# Patient Record
Sex: FEMALE | Race: ASIAN | NOT HISPANIC OR LATINO | Employment: FULL TIME | URBAN - METROPOLITAN AREA
[De-identification: names, ages, dates, MRNs, and addresses within clinical notes are randomized per-mention and may not be internally consistent; named-entity substitution may affect disease eponyms.]

---

## 2017-04-07 ENCOUNTER — MYC REFILL (OUTPATIENT)
Dept: FAMILY MEDICINE | Facility: CLINIC | Age: 37
End: 2017-04-07

## 2017-04-07 DIAGNOSIS — J30.1 SEASONAL ALLERGIC RHINITIS DUE TO POLLEN: ICD-10-CM

## 2017-04-12 RX ORDER — MONTELUKAST SODIUM 10 MG/1
10 TABLET ORAL AT BEDTIME
Qty: 90 TABLET | Refills: 4 | Status: SHIPPED | OUTPATIENT
Start: 2017-04-12 | End: 2017-04-13

## 2017-04-12 RX ORDER — MONTELUKAST SODIUM 10 MG/1
10 TABLET ORAL AT BEDTIME
Qty: 90 TABLET | Refills: 4 | Status: SHIPPED | OUTPATIENT
Start: 2017-04-12 | End: 2018-02-23

## 2017-04-12 NOTE — TELEPHONE ENCOUNTER
Message from [facility]:  Loli Cary RN Tue Apr 11, 2017 8:09 AM     From: ISIAH MAYNARD   Sent: Tue Apr 11, 2017 8:09 AM   To: [facility]  Subject: FW: Medication Renewal Request      ----- Message -----   From: Cami Hernandez RN   Sent: 4/10/2017 4:26 PM   To: Loli Cary RN  Subject: FW: Medication Renewal Request     Sending this back to [facility], to Loli ROBLES, as per [employer] stream you are working on this already.   ----- Message -----   From: Elena Tony RN   Sent: 4/10/2017 2:41 PM   To: [facility]  Subject: FW: Medication Renewal Request         ----- Message -----   From: Isiah Maynard   Sent: 4/10/2017 8:42 AM   To: [facility]  Subject: RE: Medication Renewal Request     My email is [email or web address].    Thanks!  Isiah  ----- Message -----  From: Loli Cary RN  Sent: 4/7/2017 3:17 PM CDT  To: Isiah Maynard  Subject: RE: Medication Renewal Request    Robert Joyce, What is your email address? Loli AN    ----- Message -----   From: Isiah Maynard   Sent: 4/7/2017 2:54 PM CDT   To: Eloise Triana MD  Subject: Medication Renewal Request    Original authorizing provider: MD Isiah Hairston would like a refill of the following medications:  fluticasone (VERAMYST) 27.5 MCG/SPRAY nasal spray [Eloise Triana MD]  montelukast (SINGULAIR) 10 MG tablet [Eloise Triana MD]    Preferred pharmacy: WRITTEN PRESCRIPTION REQUESTED    Comment:  I recently switched insurance companies from [employer] to [employer]. My mail order prescription service has changed. Can you e-mail me copies of my prescriptions for Singulair and Veramyst so that I can submit to my new mail order service?    My [employer] ID is:  [id number]  Choice POS II  Grp: [id number]  RX Bin# [id number]    Thanks,  Isiah   [phone]

## 2017-04-13 ENCOUNTER — MYC MEDICAL ADVICE (OUTPATIENT)
Dept: OBGYN | Facility: CLINIC | Age: 37
End: 2017-04-13

## 2017-04-13 DIAGNOSIS — J30.1 SEASONAL ALLERGIC RHINITIS DUE TO POLLEN: ICD-10-CM

## 2017-04-13 RX ORDER — MONTELUKAST SODIUM 10 MG/1
10 TABLET ORAL AT BEDTIME
Qty: 90 TABLET | Refills: 4 | Status: SHIPPED | OUTPATIENT
Start: 2017-04-13 | End: 2018-02-23

## 2017-04-13 NOTE — TELEPHONE ENCOUNTER
Dr. Triana signed these prescriptions for this [place] patient here at [facility]. They were emailed and mailed to patient per request

## 2017-04-13 NOTE — TELEPHONE ENCOUNTER
Isiah requested we email her the prescriptions,now she wanted them faxed to [facility].    So I re-ordered both Singular and fluticasone and e-prescribed them to [facility].    I informed Isiah of this by [employer].

## 2018-02-20 NOTE — PROGRESS NOTES
SUBJECTIVE:   CC: Isiah Reina is an 37 year old woman who presents for preventive health visit. Last visit was in , here at [facility] with Dr. Triana. She had a normal PAP and negative HPV. She would like her lipids  Assessed. She takes a pre- vitamin daily, is unsure how much Vitamin D is in each capsule.   Only concern is she is undergoing care with [facility]. Was on clomid for a cycle which she could not tolerate d/t headaches and nausea. She prefers the regimen she has now. LMP was 2/15/18. No known cause for her not conceiving yet, per pt, so they are just trying to increase her chances via use of hormones.       Healthy Habits:    Do you get at least three servings of calcium containing foods daily (dairy, green leafy vegetables, etc.)? Yes    Typical breakfast: Yogurt, egg ham scramble    Typical lunch: Javed Lawrence (Kinh Di)    Typical dinner: Was in an airport, so ate a greek yogurt and chicken nuggets.    Amount of exercise or daily activities, outside of work: 5-6 times per week. Typically runs, but during winter does pilates, yoga, spin classes, barre. Cardio is 45-60 min at least 5 times per week. Trying to build in relaxation.She loves to bake and does this consistently for stress reduction.    Problems taking medications regularly No    Medication side effects: No    Have you had an eye exam in the past two years? Yes, was 2017, wears glasses.    Do you see a dentist twice per year? No    Do you have sleep apnea, excessive snoring or daytime drowsiness?no        Today's PHQ-2 Score:   PHQ-2 (  [employer]) 2015   Q1: Little interest or pleasure in doing things 0 0   Q2: Feeling down, depressed or hopeless 0 0   PHQ-2 Score 0 0       Abuse: Current or Past(Physical, Sexual or Emotional)- No  Do you feel safe in your environment - Yes    Social History   Substance Use Topics     Smoking status: Never Smoker     Smokeless tobacco: Not on file      Alcohol use 0.6 oz/week     1 Standard drinks or equivalent per week     If you drink alcohol do you typically have >3 drinks per day or >7 drinks per week? NO                   Reviewed orders with patient.  Reviewed health maintenance and updated orders accordingly - Yes  Labs reviewed in [facility]    Mammogram not appropriate for this patient based on age.    Pertinent mammograms are reviewed under the imaging tab.  History of abnormal Pap smear: NO - age 30-65 PAP every 5 years with negative HPV co-testing recommended    Reviewed and updated as needed this visit by clinical staff       Reviewed and updated as needed this visit by Provider        Past Medical History:   Diagnosis Date     Allergic rhinitis      Depression     During grad school only, was situational. She received counseling weekly for a period of time. No suicidal ideation. No medicaiton needed.     Rectal bleeding     had colonoscopy all normal, no recurrence     Right wrist fracture 2014      History reviewed. No pertinent surgical history.     PAST MEDICAL HISTORY:   Past Medical History:   Diagnosis Date     Allergic rhinitis      Depression     During grad school only, was situational. She received counseling weekly for a period of time. No suicidal ideation. No medicaiton needed.     Rectal bleeding     had colonoscopy all normal, no recurrence     Right wrist fracture 2014       PAST SURGICAL HISTORY: History reviewed. No pertinent surgical history.    FAMILY HISTORY:   Family History   Problem Relation Age of Onset     Adopted: Yes       SOCIAL HISTORY:   Social History   Substance Use Topics     Smoking status: Never Smoker     Smokeless tobacco: Never Used     Alcohol use 0.6 oz/week     1 Standard drinks or equivalent per week       ROS:  C: NEGATIVE for fever, chills, change in weight  I: NEGATIVE for worrisome rashes, moles or lesions  E: NEGATIVE for vision changes or irritation  ENT: NEGATIVE for ear, mouth and throat problems  R:  "NEGATIVE for significant cough or SOB  B: NEGATIVE for masses, tenderness or discharge  CV: NEGATIVE for chest pain, palpitations or peripheral edema  GI: NEGATIVE for nausea, abdominal pain, heartburn, or change in bowel habits  : NEGATIVE for unusual urinary or vaginal symptoms. Periods are regular.  M: NEGATIVE for significant arthralgias or myalgia  N: NEGATIVE for weakness, dizziness or paresthesias  P: NEGATIVE for changes in mood or affect    OBJECTIVE:   /80  Pulse 67  Temp 98.2  F (36.8  C) (Oral)  Ht 5' 4.5\" (163.8 cm)  Wt 175 lb 1.9 oz (79.4 kg)  LMP 02/17/2018 (Exact Date)  SpO2 97%  Breastfeeding? No  BMI 29.6 kg/m2  EXAM:  GENERAL: healthy, alert and no distress  Ears, Nose and Throat: tympanic membranes swollen bilaterally, nose is otherwise clear, throat clear.   NECK: no adenopathy, no asymmetry, masses, or scars and thyroid normal to palpation  RESP: lungs clear to auscultation - no rales, rhonchi or wheezes  CV: regular rate and rhythm, normal S1 S2, no S3 or S4, no murmur, click or rub, no peripheral edema and peripheral pulses strong  ABDOMEN: soft, nontender, no hepatosplenomegaly, no masses and bowel sounds normal  MS: no gross musculoskeletal defects noted, no edema    ASSESSMENT/PLAN:   1. Routine general medical examination at a health care facility  Pt is not fasting today. Will send results via [employer]. No PAP needed today. Pt declines any need for STD screening. She is under the care of [facility] for fertility and is interested in another opinion and after discussion, possibly would benefit from a support group/counseling. I will send this information to her with her lab results in [employer]. Her BP has been increased at her visits with [facility], but never here. Did mention she could purchase a cuff is she is concerned, but reassurance provided today based on our readings.  Pt also requests a recommendation for dentistry in [place]. Will supply via " "[facility] as well.    - Basic Metabolic Panel (LabDAQ)  - Vitamin D Deficiency    2. Chronic non-seasonal allergic rhinitis, unspecified trigger  Refilled today and sent to [facility].  - montelukast (SINGULAIR) 10 MG tablet; Take 1 tablet (10 mg) by mouth At Bedtime  Dispense: 90 tablet; Refill: 4    3. Insomnia, transient  Used only when travelling internationally. Printed a copy for pt today.  - zolpidem (AMBIEN) 10 MG tablet; Take 1 tablet (10 mg) by mouth nightly as needed for sleep  Dispense: 20 tablet; Refill: 0    4. Screening for hyperlipidemia  Will send results to [facility].  - Lipid Panel Plus ([place])    5. Encounter for vitamin deficiency screening  Will send results to [facility].  - Vitamin D Deficiency    COUNSELING:   Reviewed preventive health counseling, as reflected in patient instructions       Regular exercise       Healthy diet/nutrition       Vision screening       Immunizations    Declined: Influenza due to Other Has never had an influenza shot and prefers not to. Did inquire about Hep B and MMR, but feels she is current with these as well.                  reports that she has never smoked. She does not have any smokeless tobacco history on file.    Estimated body mass index is 183.17 kg/(m^2) as calculated from the following:    Height as of 11/11/16: 2' 1.39\" (64.5 cm).    Weight as of 11/11/16: 168 lb (76.2 kg).       Counseling Resources:  ATP IV Guidelines  Pooled Cohorts Equation Calculator  Breast Cancer Risk Calculator  FRAX Risk Assessment  ICSI Preventive Guidelines  Dietary Guidelines for Americans, 2010  USDA's MyPlate  ASA Prophylaxis  Lung CA Screening    Juan Nix, NIEVES  [facility]  "

## 2018-02-23 ENCOUNTER — OFFICE VISIT (OUTPATIENT)
Dept: FAMILY MEDICINE | Facility: CLINIC | Age: 38
End: 2018-02-23
Payer: COMMERCIAL

## 2018-02-23 VITALS
TEMPERATURE: 98.2 F | HEIGHT: 65 IN | DIASTOLIC BLOOD PRESSURE: 80 MMHG | BODY MASS INDEX: 29.18 KG/M2 | OXYGEN SATURATION: 97 % | HEART RATE: 67 BPM | SYSTOLIC BLOOD PRESSURE: 117 MMHG | WEIGHT: 175.12 LBS

## 2018-02-23 DIAGNOSIS — Z13.220 SCREENING FOR HYPERLIPIDEMIA: ICD-10-CM

## 2018-02-23 DIAGNOSIS — Z00.00 ROUTINE GENERAL MEDICAL EXAMINATION AT A HEALTH CARE FACILITY: Primary | ICD-10-CM

## 2018-02-23 DIAGNOSIS — J30.89 CHRONIC NON-SEASONAL ALLERGIC RHINITIS, UNSPECIFIED TRIGGER: ICD-10-CM

## 2018-02-23 DIAGNOSIS — F51.02 INSOMNIA, TRANSIENT: ICD-10-CM

## 2018-02-23 DIAGNOSIS — Z13.21 ENCOUNTER FOR VITAMIN DEFICIENCY SCREENING: ICD-10-CM

## 2018-02-23 LAB
ALT SERPL-CCNC: 14 U/L (ref 0–50)
AST SERPL-CCNC: 31 U/L (ref 0–45)
BUN SERPL-MCNC: 15 MG/DL (ref 5–24)
CALCIUM SERPL-MCNC: 9.2 MG/DL (ref 8.5–10.4)
CHLORIDE SERPLBLD-SCNC: 103 MMOL/L (ref 94–109)
CHOLEST SERPL-MCNC: 170 MG/DL (ref 0–200)
CHOLEST/HDLC SERPL: 2.6 {RATIO} (ref 0–5)
CO2 SERPL-SCNC: 27 MMOL/L (ref 20–32)
CREAT SERPL-MCNC: 0.5 MG/DL (ref 0.6–1.3)
EGFR CALCULATED (BLACK REFERENCE): 178.5
EGFR CALCULATED (NON BLACK REFERENCE): 147.6
FASTING SPECIMEN: NO
GLUCOSE SERPL-MCNC: 111 MG/DL (ref 60–109)
GLUCOSE SERPL-MCNC: 111 MG/DL (ref 60–109)
HDLC SERPL-MCNC: 66 MG/DL
LDLC SERPL CALC-MCNC: 83 MG/DL (ref 0–129)
POTASSIUM SERPL-SCNC: 3.7 MMOL/L (ref 3.4–5.3)
SODIUM SERPL-SCNC: 141 MMOL/L (ref 133–144)
TRIGL SERPL-MCNC: 108 MG/DL (ref 0–150)
VLDL-CHOLESTEROL: 22 (ref 7–32)

## 2018-02-23 RX ORDER — MONTELUKAST SODIUM 10 MG/1
10 TABLET ORAL AT BEDTIME
Qty: 90 TABLET | Refills: 4 | Status: SHIPPED | OUTPATIENT
Start: 2018-02-23 | End: 2018-09-19

## 2018-02-23 RX ORDER — ZOLPIDEM TARTRATE 10 MG/1
10 TABLET ORAL
Qty: 20 TABLET | Refills: 0 | Status: SHIPPED | OUTPATIENT
Start: 2018-02-23 | End: 2019-03-18

## 2018-02-23 ASSESSMENT — PAIN SCALES - GENERAL: PAINLEVEL: NO PAIN (0)

## 2018-02-23 NOTE — MR AVS SNAPSHOT
After Visit Summary   2/23/2018    Isiah Reina    MRN: [id number]           Patient Information     Date Of Birth          1980        Visit Information        Provider Department      2/23/2018 3:00 PM Juan Nix NP [facility]        Today's Diagnoses     Routine general medical examination at a health care facility    -  1    Chronic non-seasonal allergic rhinitis, unspecified trigger        Insomnia, transient        Screening for hyperlipidemia        Encounter for vitamin deficiency screening          Care Instructions      Preventive Health Recommendations  Female Ages 26 - 39  Yearly exam:   See your health care provider every year in order to    Review health changes.     Discuss preventive care.      Review your medicines if you your doctor has prescribed any.    Until age 30: Get a Pap test every three years (more often if you have had an abnormal result).    After age 30: Talk to your doctor about whether you should have a Pap test every 3 years or have a Pap test with HPV screening every 5 years.   You do not need a Pap test if your uterus was removed (hysterectomy) and you have not had cancer.  You should be tested each year for STDs (sexually transmitted diseases), if you're at risk.   Talk to your provider about how often to have your cholesterol checked.  If you are at risk for diabetes, you should have a diabetes test (fasting glucose).  Shots: Get a flu shot each year. Get a tetanus shot every 10 years.   Nutrition:     Eat at least 5 servings of fruits and vegetables each day.    Eat whole-grain bread, whole-wheat pasta and brown rice instead of white grains and rice.    Talk to your provider about Calcium and Vitamin D.     Lifestyle    Exercise at least 150 minutes a week (30 minutes a day, 5 days of the week). This will help you control your weight and prevent disease.    Limit alcohol to one drink per day.    No smoking.     Wear sunscreen to prevent  "skin cancer.    See your dentist every six months for an exam and cleaning.            Follow-ups after your visit        Who to contact     Please call your clinic at [phone] to:    Ask questions about your health    Make or cancel appointments    Discuss your medicines    Learn about your test results    Speak to your doctor            Additional Information About Your Visit        Shir[employer] Information     [employer] gives you secure access to your electronic health record. If you see a primary care provider, you can also send messages to your care team and make appointments. If you have questions, please call your primary care clinic.  If you do not have a primary care provider, please call [phone] and they will assist you.      [employer] is an electronic gateway that provides easy, online access to your medical records. With [employer], you can request a clinic appointment, read your test results, renew a prescription or communicate with your care team.     To access your existing account, please contact your [facility] or call [phone] for assistance.        Care EveryWhere ID     This is your Care EveryWhere ID. This could be used by other organizations to access your [place] medical records  [id number]        Your Vitals Were     Pulse Temperature Height Last Period Pulse Oximetry Breastfeeding?    67 98.2  F (36.8  C) (Oral) 5' 4.5\" (163.8 cm) 02/17/2018 (Exact Date) 97% No    BMI (Body Mass Index)                   29.6 kg/m2            Blood Pressure from Last 3 Encounters:   02/23/18 117/80   11/11/16 120/81   05/13/16 122/75    Weight from Last 3 Encounters:   02/23/18 175 lb 1.9 oz (79.4 kg)   11/11/16 168 lb (76.2 kg)   05/13/16 171 lb (77.6 kg)              We Performed the Following     ADMIN INFLUENZA VIRUS VACCINE     Basic Metabolic Panel (LabDAQ)     Lipid Panel Plus ([place])     Vitamin D Deficiency          Where to get your medicines      These " medications were sent to [facility] - [place], [place] - [address]  [address], [facility] [address]     Phone:  [phone]     montelukast 10 MG tablet         Some of these will need a paper prescription and others can be bought over the counter.  Ask your nurse if you have questions.     Bring a paper prescription for each of these medications     zolpidem 10 MG tablet          Primary Care Provider Office Phone # Fax #    Mckenna De Jesus PA-C [phone]       [address] [place] [address]        Equal Access to Services     [facility]: Hadii aminta scott hadasho Soelijah, waaxda luqadaha, qaybta kaalmada adedaveyabelkis, katelyn orca . So [facility] [phone].    ATENCIÓN: Si habla español, tiene a dominguez disposición servicios gratuitos de asistencia lingüística. [facility] [phone].    We comply with applicable federal civil rights laws and [place] laws. We do not discriminate on the basis of race, color, national origin, age, disability, sex, sexual orientation, or gender identity.            Thank you!     Thank you for choosing [facility]  for your care. Our goal is always to provide you with excellent care. Hearing back from our patients is one way we can continue to improve our services. Please take a few minutes to complete the written survey that you may receive in the mail after your visit with us. Thank you!             Your Updated Medication List - Protect others around you: Learn how to safely use, store and throw away your medicines at www.disposemymeds.org.          This list is accurate as of 2/23/18  5:20 PM.  Always use your most recent med list.                   Brand Name Dispense Instructions for use Diagnosis    fexofenadine 180 MG tablet    ALLEGRA    90 tablet    Take 1 tablet (180 mg) by mouth as needed    Seasonal allergic rhinitis due to pollen       fluticasone 27.5 MCG/SPRAY spray     VERAMYST    30 g    Spray 2 sprays into both nostrils daily    Seasonal allergic rhinitis due to pollen       LETROZOLE PO      Take 5 mg by mouth daily        montelukast 10 MG tablet    SINGULAIR    90 tablet    Take 1 tablet (10 mg) by mouth At Bedtime    Chronic non-seasonal allergic rhinitis, unspecified trigger       OVIDREL 250 MCG/0.5ML injection   Generic drug:  choriogonadotropin ross      Inject 250 mcg Subcutaneous once        progesterone 100 MG capsule    PROMETRIUM     Take 100 mg by mouth 2 times daily        zolpidem 10 MG tablet    AMBIEN    20 tablet    Take 1 tablet (10 mg) by mouth nightly as needed for sleep    Insomnia, transient

## 2018-02-23 NOTE — NURSING NOTE
"37 year old  Chief Complaint   Patient presents with     Physical     patient is not fasting     RECHECK     Blood Pressure concerns       Blood pressure 117/80, pulse 67, temperature 98.2  F (36.8  C), temperature source Oral, height 5' 4.5\" (163.8 cm), weight 175 lb 1.9 oz (79.4 kg), last menstrual period 02/17/2018, SpO2 97 %, not currently breastfeeding. Body mass index is 29.6 kg/(m^2).  Patient Active Problem List   Diagnosis     Seasonal allergic rhinitis       Wt Readings from Last 2 Encounters:   02/23/18 175 lb 1.9 oz (79.4 kg)   11/11/16 168 lb (76.2 kg)     BP Readings from Last 3 Encounters:   02/23/18 117/80   11/11/16 120/81   05/13/16 122/75         Current Outpatient Prescriptions   Medication     LETROZOLE PO     choriogonadotropin ross (OVIDREL) 250 MCG/0.5ML injection     progesterone (PROMETRIUM) 100 MG capsule     montelukast (SINGULAIR) 10 MG tablet     fluticasone (VERAMYST) 27.5 MCG/SPRAY spray     zolpidem (AMBIEN) 10 MG tablet     [DISCONTINUED] montelukast (SINGULAIR) 10 MG tablet     fexofenadine (ALLEGRA) 180 MG tablet     No current facility-administered medications for this visit.        Social History   Substance Use Topics     Smoking status: Never Smoker     Smokeless tobacco: Not on file     Alcohol use 0.6 oz/week     1 Standard drinks or equivalent per week       Health Maintenance Due   Topic Date Due     INFLUENZA VACCINE (SYSTEM ASSIGNED)  09/01/2017       Lab Results   Component Value Date    PAP NIL 11/11/2016         ANDRE ClemensPMyleneNMylene  February 23, 2018 3:09 PM    "

## 2018-02-24 LAB — DEPRECATED CALCIDIOL+CALCIFEROL SERPL-MC: 16 UG/L (ref 20–75)

## 2018-04-18 ENCOUNTER — MEDICAL CORRESPONDENCE (OUTPATIENT)
Dept: HEALTH INFORMATION MANAGEMENT | Facility: CLINIC | Age: 38
End: 2018-04-18

## 2018-04-24 ENCOUNTER — TRANSFERRED RECORDS (OUTPATIENT)
Dept: HEALTH INFORMATION MANAGEMENT | Facility: CLINIC | Age: 38
End: 2018-04-24

## 2018-04-25 DIAGNOSIS — Z01.89 ENCOUNTER FOR LABORATORY TEST: Primary | ICD-10-CM

## 2018-05-03 ENCOUNTER — TRANSFERRED RECORDS (OUTPATIENT)
Dept: HEALTH INFORMATION MANAGEMENT | Facility: CLINIC | Age: 38
End: 2018-05-03

## 2018-05-08 ENCOUNTER — TRANSFERRED RECORDS (OUTPATIENT)
Dept: HEALTH INFORMATION MANAGEMENT | Facility: CLINIC | Age: 38
End: 2018-05-08

## 2018-06-01 ENCOUNTER — MYC MEDICAL ADVICE (OUTPATIENT)
Dept: FAMILY MEDICINE | Facility: CLINIC | Age: 38
End: 2018-06-01

## 2018-09-19 DIAGNOSIS — J30.89 CHRONIC NON-SEASONAL ALLERGIC RHINITIS, UNSPECIFIED TRIGGER: ICD-10-CM

## 2018-09-19 RX ORDER — MONTELUKAST SODIUM 10 MG/1
10 TABLET ORAL AT BEDTIME
Qty: 90 TABLET | Refills: 2 | Status: SHIPPED | OUTPATIENT
Start: 2018-09-19 | End: 2019-05-02

## 2018-09-20 ENCOUNTER — ALLIED HEALTH/NURSE VISIT (OUTPATIENT)
Dept: FAMILY MEDICINE | Facility: CLINIC | Age: 38
End: 2018-09-20
Payer: COMMERCIAL

## 2018-09-20 DIAGNOSIS — Z23 NEED FOR VARICELLA VACCINE: ICD-10-CM

## 2018-09-20 DIAGNOSIS — Z23 NEED FOR MMR VACCINE: Primary | ICD-10-CM

## 2018-10-22 ENCOUNTER — ALLIED HEALTH/NURSE VISIT (OUTPATIENT)
Dept: FAMILY MEDICINE | Facility: CLINIC | Age: 38
End: 2018-10-22
Payer: COMMERCIAL

## 2018-10-22 DIAGNOSIS — Z01.89 ENCOUNTER FOR LABORATORY TEST: Primary | ICD-10-CM

## 2018-10-22 DIAGNOSIS — Z23 NEED FOR CHICKENPOX VACCINATION: Primary | ICD-10-CM

## 2018-10-22 DIAGNOSIS — Z23 NEED FOR MMR VACCINE: ICD-10-CM

## 2018-10-22 NOTE — MR AVS SNAPSHOT
After Visit Summary   10/22/2018    Isiah Reina    MRN: [id number]           Patient Information     Date Of Birth          1980        Visit Information        Provider Department      10/22/2018 3:00 PM Nurse, [facility]        Today's Diagnoses     Need for chickenpox vaccination    -  1    Need for MMR vaccine           Follow-ups after your visit        Who to contact     Please call your clinic at [phone] to:    Ask questions about your health    Make or cancel appointments    Discuss your medicines    Learn about your test results    Speak to your doctor            Additional Information About Your Visit        [employer] Information     [employer] gives you secure access to your electronic health record. If you see a primary care provider, you can also send messages to your care team and make appointments. If you have questions, please call your primary care clinic.  If you do not have a primary care provider, please call [phone] and they will assist you.      [employer] is an electronic gateway that provides easy, online access to your medical records. With [employer], you can request a clinic appointment, read your test results, renew a prescription or communicate with your care team.     To access your existing account, please contact your [facility] or call [phone] for assistance.        Care EveryWhere ID     This is your Care EveryWhere ID. This could be used by other organizations to access your [place] medical records  [id number]         Blood Pressure from Last 3 Encounters:   02/23/18 117/80   11/11/16 120/81   05/13/16 122/75    Weight from Last 3 Encounters:   02/23/18 175 lb 1.9 oz (79.4 kg)   11/11/16 168 lb (76.2 kg)   05/13/16 171 lb (77.6 kg)              We Performed the Following     CHICKEN POX VACCINE,LIVE,SUBCUT     MMR VIRUS IMMUNIZATION, SUBCUT     VACCINE ADMINISTRATION, EACH ADDITIONAL     VACCINE  ADMINISTRATION, INITIAL        Primary Care Provider Office Phone # Fax #    Mckenna De Jesus PA-C [phone]       [address] [place] [address]        Equal Access to Services     LINDA VÁSQUEZ : Hadii aad ku hadjossieo Soelijah, waaxda luqadaha, qaybta kaalmada adedaveyabelkis, katelyn iljody sujonaenzo cronin. So [facility] [phone].    ATENCIÓN: Si habla español, tiene a dominguez disposición servicios gratuitos de asistencia lingüística. [facility] [phone].    We comply with applicable federal civil rights laws and [place] laws. We do not discriminate on the basis of race, color, national origin, age, disability, sex, sexual orientation, or gender identity.            Thank you!     Thank you for choosing [facility]  for your care. Our goal is always to provide you with excellent care. Hearing back from our patients is one way we can continue to improve our services. Please take a few minutes to complete the written survey that you may receive in the mail after your visit with us. Thank you!             Your Updated Medication List - Protect others around you: Learn how to safely use, store and throw away your medicines at www.disposemymeds.org.          This list is accurate as of 10/22/18  3:53 PM.  Always use your most recent med list.                   Brand Name Dispense Instructions for use Diagnosis    fexofenadine 180 MG tablet    ALLEGRA    90 tablet    Take 1 tablet (180 mg) by mouth as needed    Seasonal allergic rhinitis due to pollen       fluticasone 27.5 MCG/SPRAY spray    VERAMYST    30 g    Spray 2 sprays into both nostrils daily    Seasonal allergic rhinitis due to pollen       LETROZOLE PO      Take 5 mg by mouth daily        montelukast 10 MG tablet    SINGULAIR    90 tablet    Take 1 tablet (10 mg) by mouth At Bedtime    Chronic non-seasonal allergic rhinitis, unspecified trigger       OVIDREL 250 MCG/0.5ML injection   Generic drug:  choriogonadotropin ross       Inject 250 mcg Subcutaneous once        progesterone 100 MG capsule    PROMETRIUM     Take 100 mg by mouth 2 times daily        zolpidem 10 MG tablet    AMBIEN    20 tablet    Take 1 tablet (10 mg) by mouth nightly as needed for sleep    Insomnia, transient

## 2019-01-22 ENCOUNTER — MEDICAL CORRESPONDENCE (OUTPATIENT)
Dept: HEALTH INFORMATION MANAGEMENT | Facility: CLINIC | Age: 39
End: 2019-01-22

## 2019-01-22 DIAGNOSIS — N97.9 PRIMARY FEMALE INFERTILITY: Primary | ICD-10-CM

## 2019-01-23 DIAGNOSIS — N97.9 PRIMARY FEMALE INFERTILITY: Primary | ICD-10-CM

## 2019-01-23 LAB
ESTRADIOL SERPL-MCNC: 563 PG/ML
PROGEST SERPL-MCNC: 13.7 NG/ML

## 2019-01-25 ENCOUNTER — TRANSFERRED RECORDS (OUTPATIENT)
Dept: HEALTH INFORMATION MANAGEMENT | Facility: CLINIC | Age: 39
End: 2019-01-25

## 2019-02-04 ENCOUNTER — MEDICAL CORRESPONDENCE (OUTPATIENT)
Dept: HEALTH INFORMATION MANAGEMENT | Facility: CLINIC | Age: 39
End: 2019-02-04

## 2019-02-06 DIAGNOSIS — O09.811 SUPERVISION OF PREGNANCY RESULTING FROM ASSISTED REPRODUCTIVE TECHNOLOGY IN FIRST TRIMESTER: Primary | ICD-10-CM

## 2019-02-06 DIAGNOSIS — N92.6 MISSED PERIOD: ICD-10-CM

## 2019-02-06 LAB
B-HCG SERPL-ACNC: 175 IU/L (ref 0–5)
T4 FREE SERPL-MCNC: 1.16 NG/DL (ref 0.76–1.46)
TSH SERPL DL<=0.005 MIU/L-ACNC: 1.23 MU/L (ref 0.4–4)

## 2019-02-08 ENCOUNTER — MEDICAL CORRESPONDENCE (OUTPATIENT)
Dept: HEALTH INFORMATION MANAGEMENT | Facility: CLINIC | Age: 39
End: 2019-02-08

## 2019-02-08 DIAGNOSIS — O09.811 SUPERVISION OF PREGNANCY RESULTING FROM ASSISTED REPRODUCTIVE TECHNOLOGY IN FIRST TRIMESTER: Primary | ICD-10-CM

## 2019-02-08 LAB — B-HCG SERPL-ACNC: 361 IU/L (ref 0–5)

## 2019-02-11 ENCOUNTER — MEDICAL CORRESPONDENCE (OUTPATIENT)
Dept: HEALTH INFORMATION MANAGEMENT | Facility: CLINIC | Age: 39
End: 2019-02-11

## 2019-02-19 DIAGNOSIS — O09.90 HIGH-RISK PREGNANCY, UNSPECIFIED TRIMESTER: Primary | ICD-10-CM

## 2019-02-19 LAB
ESTRADIOL SERPL-MCNC: 735 PG/ML
PROGEST SERPL-MCNC: 40.4 NG/ML

## 2019-03-04 DIAGNOSIS — O09.811 SUPERVISION OF PREGNANCY RESULTING FROM ASSISTED REPRODUCTIVE TECHNOLOGY IN FIRST TRIMESTER: Primary | ICD-10-CM

## 2019-03-05 DIAGNOSIS — O09.811 SUPERVISION OF PREGNANCY RESULTING FROM ASSISTED REPRODUCTIVE TECHNOLOGY IN FIRST TRIMESTER: ICD-10-CM

## 2019-03-05 LAB
ESTRADIOL SERPL-MCNC: 794 PG/ML
PROGEST SERPL-MCNC: 28.4 NG/ML

## 2019-03-07 ENCOUNTER — TRANSFERRED RECORDS (OUTPATIENT)
Dept: HEALTH INFORMATION MANAGEMENT | Facility: CLINIC | Age: 39
End: 2019-03-07

## 2019-03-18 ENCOUNTER — HOSPITAL ENCOUNTER (OUTPATIENT)
Dept: ULTRASOUND IMAGING | Facility: CLINIC | Age: 39
Discharge: HOME | End: 2019-03-18
Attending: ADVANCED PRACTICE MIDWIFE | Admitting: ADVANCED PRACTICE MIDWIFE
Payer: COMMERCIAL

## 2019-03-18 ENCOUNTER — OFFICE VISIT (OUTPATIENT)
Dept: OBGYN | Facility: CLINIC | Age: 39
End: 2019-03-18
Attending: ADVANCED PRACTICE MIDWIFE
Payer: COMMERCIAL

## 2019-03-18 DIAGNOSIS — O09.819 SUPERVISION OF PREGNANCY RESULTING FROM ASSISTED REPRODUCTIVE TECHNOLOGY: ICD-10-CM

## 2019-03-18 DIAGNOSIS — O20.9 VAGINAL BLEEDING AFFECTING EARLY PREGNANCY: ICD-10-CM

## 2019-03-18 DIAGNOSIS — O09.819 SUPERVISION OF PREGNANCY RESULTING FROM ASSISTED REPRODUCTIVE TECHNOLOGY: Primary | ICD-10-CM

## 2019-03-18 LAB
ABO + RH BLD: NORMAL
ABO + RH BLD: NORMAL
BASOPHILS # BLD AUTO: 0 10E9/L (ref 0–0.2)
BASOPHILS NFR BLD AUTO: 0.2 %
BLD GP AB SCN SERPL QL: NORMAL
BLOOD BANK CMNT PATIENT-IMP: NORMAL
DIFFERENTIAL METHOD BLD: NORMAL
EOSINOPHIL # BLD AUTO: 0.1 10E9/L (ref 0–0.7)
EOSINOPHIL NFR BLD AUTO: 1.3 %
ERYTHROCYTE [DISTWIDTH] IN BLOOD BY AUTOMATED COUNT: 12.7 % (ref 10–15)
HCT VFR BLD AUTO: 35.3 % (ref 35–47)
HGB BLD-MCNC: 12.3 G/DL (ref 11.7–15.7)
IMM GRANULOCYTES # BLD: 0 10E9/L (ref 0–0.4)
IMM GRANULOCYTES NFR BLD: 0.2 %
LYMPHOCYTES # BLD AUTO: 2.2 10E9/L (ref 0.8–5.3)
LYMPHOCYTES NFR BLD AUTO: 25.8 %
MCH RBC QN AUTO: 29.4 PG (ref 26.5–33)
MCHC RBC AUTO-ENTMCNC: 34.8 G/DL (ref 31.5–36.5)
MCV RBC AUTO: 84 FL (ref 78–100)
MONOCYTES # BLD AUTO: 0.7 10E9/L (ref 0–1.3)
MONOCYTES NFR BLD AUTO: 8.2 %
NEUTROPHILS # BLD AUTO: 5.4 10E9/L (ref 1.6–8.3)
NEUTROPHILS NFR BLD AUTO: 64.3 %
NRBC # BLD AUTO: 0 10*3/UL
NRBC BLD AUTO-RTO: 0 /100
PLATELET # BLD AUTO: 231 10E9/L (ref 150–450)
RBC # BLD AUTO: 4.18 10E12/L (ref 3.8–5.2)
SPECIMEN EXP DATE BLD: NORMAL
WBC # BLD AUTO: 8.3 10E9/L (ref 4–11)

## 2019-03-18 PROCEDURE — 87389 HIV-1 AG W/HIV-1&-2 AB AG IA: CPT | Performed by: ADVANCED PRACTICE MIDWIFE

## 2019-03-18 PROCEDURE — 76801 OB US < 14 WKS SINGLE FETUS: CPT

## 2019-03-18 PROCEDURE — 87086 URINE CULTURE/COLONY COUNT: CPT | Performed by: ADVANCED PRACTICE MIDWIFE

## 2019-03-18 PROCEDURE — 86787 VARICELLA-ZOSTER ANTIBODY: CPT | Performed by: ADVANCED PRACTICE MIDWIFE

## 2019-03-18 PROCEDURE — 85025 COMPLETE CBC W/AUTO DIFF WBC: CPT | Performed by: ADVANCED PRACTICE MIDWIFE

## 2019-03-18 PROCEDURE — 82306 VITAMIN D 25 HYDROXY: CPT | Performed by: ADVANCED PRACTICE MIDWIFE

## 2019-03-18 PROCEDURE — 87340 HEPATITIS B SURFACE AG IA: CPT | Performed by: ADVANCED PRACTICE MIDWIFE

## 2019-03-18 PROCEDURE — 86762 RUBELLA ANTIBODY: CPT | Performed by: ADVANCED PRACTICE MIDWIFE

## 2019-03-18 PROCEDURE — 0064U ANTB TP TOTAL&RPR IA QUAL: CPT | Performed by: ADVANCED PRACTICE MIDWIFE

## 2019-03-18 PROCEDURE — 86850 RBC ANTIBODY SCREEN: CPT | Performed by: ADVANCED PRACTICE MIDWIFE

## 2019-03-18 PROCEDURE — G0463 HOSPITAL OUTPT CLINIC VISIT: HCPCS

## 2019-03-18 PROCEDURE — 86706 HEP B SURFACE ANTIBODY: CPT | Performed by: ADVANCED PRACTICE MIDWIFE

## 2019-03-18 PROCEDURE — 86900 BLOOD TYPING SEROLOGIC ABO: CPT | Performed by: ADVANCED PRACTICE MIDWIFE

## 2019-03-18 PROCEDURE — 87088 URINE BACTERIA CULTURE: CPT | Performed by: ADVANCED PRACTICE MIDWIFE

## 2019-03-18 PROCEDURE — 86901 BLOOD TYPING SEROLOGIC RH(D): CPT | Performed by: ADVANCED PRACTICE MIDWIFE

## 2019-03-18 PROCEDURE — 36415 COLL VENOUS BLD VENIPUNCTURE: CPT | Performed by: ADVANCED PRACTICE MIDWIFE

## 2019-03-18 RX ORDER — PRENATAL WITH FERROUS FUM AND FOLIC ACID 3080; 920; 120; 400; 22; 1.84; 3; 20; 10; 1; 12; 200; 27; 25; 2 [IU]/1; [IU]/1; MG/1; [IU]/1; MG/1; MG/1; MG/1; MG/1; MG/1; MG/1; UG/1; MG/1; MG/1; MG/1; MG/1
1 TABLET ORAL DAILY
COMMUNITY
End: 2021-05-04

## 2019-03-18 RX ORDER — PROMETHAZINE HYDROCHLORIDE 25 MG/1
25 SUPPOSITORY RECTAL PRN
Refills: 1 | COMMUNITY
Start: 2019-03-08 | End: 2019-06-05

## 2019-03-18 SDOH — HEALTH STABILITY: MENTAL HEALTH: HOW OFTEN DO YOU HAVE A DRINK CONTAINING ALCOHOL?: MONTHLY OR LESS

## 2019-03-18 SDOH — HEALTH STABILITY: MENTAL HEALTH: HOW MANY STANDARD DRINKS CONTAINING ALCOHOL DO YOU HAVE ON A TYPICAL DAY?: 1 OR 2

## 2019-03-18 SDOH — HEALTH STABILITY: MENTAL HEALTH: HOW OFTEN DO YOU HAVE 6 OR MORE DRINKS ON ONE OCCASION?: NEVER

## 2019-03-18 NOTE — PROGRESS NOTES
[facility] OB Intake note  Subjective   38 year old woman presents to clinic for initiation of OB care. Pregnant  at 10w1d by Estimated Date of Delivery: Oct 13, 2019 based on IVF transfer.  Reviewed dating ultrasound. Pregnancy is planned.      Symptoms since LMP include spotting, r/t small subchorionic hemorrhage. Nausea, constipation and bloating. No vomiting.  Patient has tried these relief measures: vitamin B-6, Unisom, small frequent meals, increased rest and increased fluids.    - Genetic/Infection questionnaire completed, risks include: none  Have you traveled during the pregnancy?No  Have your sexual partner(s) travelled during the pregnancy?No    - Current Medications    Current Outpatient Medications   Medication Sig Dispense Refill     aspirin (ASA) 81 MG tablet 1 tablet daily       calcium citrate-vitamin D (CITRACAL) 315-200 MG-UNIT TABS per tablet Take 1 tablet by mouth 2 times daily       Docosahexaenoic Acid (DHA NATURAL OMEGA-3 PO) Take 1 tablet by mouth daily       PRENATAL 27-1 MG TABS Take 1 tablet by mouth daily       Simethicone (GAS-X PO) Take 160 mg by mouth once       fexofenadine (ALLEGRA) 180 MG tablet Take 1 tablet (180 mg) by mouth as needed (Patient not taking: Reported on 3/18/2019) 90 tablet 3     fluticasone (VERAMYST) 27.5 MCG/SPRAY spray Spray 2 sprays into both nostrils daily (Patient not taking: Reported on 3/18/2019) 30 g 4     montelukast (SINGULAIR) 10 MG tablet Take 1 tablet (10 mg) by mouth At Bedtime (Patient not taking: Reported on 3/18/2019) 90 tablet 2     promethazine (PHENERGAN) 25 MG suppository 25 mg as needed  1     - Co-morbids    Past Medical History:   Diagnosis Date     Allergic rhinitis      Depression     During grad school only, was situational. She received counseling weekly for a period of time. No suicidal ideation. No medicaiton needed.     Hx of previous reproductive problem     3 medicated IUIs, 2 rounds of IVF, 2 FETs, 1 chemical pregn     Rectal  bleeding     had colonoscopy all normal, no recurrence     Right wrist fracture      - Risk for GDM -  does not Personal history of GDM, BMI>30, h/o prediabetes/glucose intolerance, first degree relative with GDM or DM    WILL NOT have an early GCT and possible Hgb A1C    - The patient does not h/o Pre Eclampsia, Current multi fetal gestation, Pre Gestational Diabetes (Type 1 or Type 2), chronic hypertension, renal disease, Autoimmune disease (systematic lupus erythematosus, antiphospholipid syndrome) so WILL NOT start low dose aspirin (81mg) starting between 12 and 28 weeks to prevent preeclampsia.    - The patient  does not have a history of spontaneous  birth so  WILL NOT consider progesterone starting at 16-20 weeks and/or serial transvaginal cervical length ultrasounds from 16-24 weeks.         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   Additional items: Denies past or present domestic violence, sexual and psychological abuse.    Objective  -VS: reviewed and within normal limits   -General appearance: no acute distress, patient is comfortable   NEUROLOGICAL/PSYCHIATRIC   - Orientated x3,   -Mood and affect: : normal     Assessment/Plan  Isiah was seen today for prenatal care.    Diagnoses and all orders for this visit:    Supervision of pregnancy resulting from assisted reproductive technology  -     25- OH-Vitamin D  -     ABO/Rh Type and Screen  -     CBC with Platelets Differential  -     Hepatitis B Surface Antigen  -     HIV Antigen Antibody Combo  -     Rubella Antibody IgG Quantitative  -     Treponema Abs w Reflex to RPR and Titer  -     Urine Culture Aerobic Bacterial  -     Hepatitis B Surface Antibody  -     Varicella Zoster Virus Antibody IgG  -     Cancel: US OB < 14 Weeks Single; Future    Vaginal bleeding affecting early pregnancy  -     US OB < 14  Weeks Single; Future    38 year old  10w1d weeks of pregnancy with MARTHA of Oct 13, 2019 by FET and Ultrasound confirms.   Outpatient Encounter Medications as of 3/18/2019   Medication Sig Dispense Refill     aspirin (ASA) 81 MG tablet 1 tablet daily       calcium citrate-vitamin D (CITRACAL) 315-200 MG-UNIT TABS per tablet Take 1 tablet by mouth 2 times daily       Docosahexaenoic Acid (DHA NATURAL OMEGA-3 PO) Take 1 tablet by mouth daily       PRENATAL 27-1 MG TABS Take 1 tablet by mouth daily       Simethicone (GAS-X PO) Take 160 mg by mouth once       fexofenadine (ALLEGRA) 180 MG tablet Take 1 tablet (180 mg) by mouth as needed (Patient not taking: Reported on 3/18/2019) 90 tablet 3     fluticasone (VERAMYST) 27.5 MCG/SPRAY spray Spray 2 sprays into both nostrils daily (Patient not taking: Reported on 3/18/2019) 30 g 4     montelukast (SINGULAIR) 10 MG tablet Take 1 tablet (10 mg) by mouth At Bedtime (Patient not taking: Reported on 3/18/2019) 90 tablet 2     promethazine (PHENERGAN) 25 MG suppository 25 mg as needed  1     [DISCONTINUED] choriogonadotropin ross (OVIDREL) 250 MCG/0.5ML injection Inject 250 mcg Subcutaneous once       [DISCONTINUED] LETROZOLE PO Take 5 mg by mouth daily       [DISCONTINUED] progesterone (PROMETRIUM) 100 MG capsule Take 100 mg by mouth 2 times daily       [DISCONTINUED] zolpidem (AMBIEN) 10 MG tablet Take 1 tablet (10 mg) by mouth nightly as needed for sleep 20 tablet 0     No facility-administered encounter medications on file as of 3/18/2019.       Orders Placed This Encounter   Procedures     US OB < 14 Weeks Single     25- OH-Vitamin D     CBC with Platelets Differential     Hepatitis B Surface Antigen     HIV Antigen Antibody Combo     Rubella Antibody IgG Quantitative     Treponema Abs w Reflex to RPR and Titer     Hepatitis B Surface Antibody     Varicella Zoster Virus Antibody IgG     ABO/Rh Type and Screen     - Oriented to Practice, types of care, and how to reach  a provider.  Pt prefers CATARINAM vs MD: Unsure, will let us know at next appt.  - Patient received 1st trimester new OB education packet complete with aide of The Expectant Family booklet including information on genetic screening test options.  - Patient desires 1st trimester screening which was ordered.  - Reviewed appropriate TWG in pregnancy given higher starting BMI.   - Patient was encouraged to start prenatal vitamins as tolerated.    - Unable to hear FHTs today and no US performed prior to the appt. Pt requests US with radiology to confirm FHTs.   - Recommend establishing with an allergist as her seasonal allergies are quite bad and she has discontinued all of her allergy medications. Reviewed allergy meds safe to take in pregnancy and provided handout.   - Patient was sent to lab for routine OB labs including varicella.       - Pregnancy concerns to be addressed by provider at new OB exam include: Needs GC/CT and need to find record of PAPs (these were NOT in the [facility] files).    Pt to RTO for NOB visit in 2 weeks and prn if questions or concerns    Ynes Reid CNM

## 2019-03-18 NOTE — LETTER
RE: Isiah Reina   [facility] [id number]     Dear Colleague,    Thank you for referring your patient, Isiah Reina, to the [facility] at [facility]. Please see a copy of my visit note below.    Created in error.     Created in error, please disregard.             S OB Intake note  Subjective   38 year old woman presents to clinic for initiation of OB care. Pregnant  at 10w1d by Estimated Date of Delivery: Oct 13, 2019 based on IVF transfer.  Reviewed dating ultrasound. Pregnancy is planned.      Symptoms since LMP include spotting, r/t small subchorionic hemorrhage. Nausea, constipation and bloating. No vomiting.  Patient has tried these relief measures: vitamin B-6, Unisom, small frequent meals, increased rest and increased fluids.    - Genetic/Infection questionnaire completed, risks include: none  Have you traveled during the pregnancy?No  Have your sexual partner(s) travelled during the pregnancy?No    - Current Medications    Current Outpatient Medications   Medication Sig Dispense Refill     aspirin (ASA) 81 MG tablet 1 tablet daily       calcium citrate-vitamin D (CITRACAL) 315-200 MG-UNIT TABS per tablet Take 1 tablet by mouth 2 times daily       Docosahexaenoic Acid (DHA NATURAL OMEGA-3 PO) Take 1 tablet by mouth daily       PRENATAL 27-1 MG TABS Take 1 tablet by mouth daily       Simethicone (GAS-X PO) Take 160 mg by mouth once       fexofenadine (ALLEGRA) 180 MG tablet Take 1 tablet (180 mg) by mouth as needed (Patient not taking: Reported on 3/18/2019) 90 tablet 3     fluticasone (VERAMYST) 27.5 MCG/SPRAY spray Spray 2 sprays into both nostrils daily (Patient not taking: Reported on 3/18/2019) 30 g 4     montelukast (SINGULAIR) 10 MG tablet Take 1 tablet (10 mg) by mouth At Bedtime (Patient not taking: Reported on 3/18/2019) 90 tablet 2     promethazine (PHENERGAN) 25 MG suppository 25 mg as needed  1     -  Co-morbids    Past Medical History:   Diagnosis Date     Allergic rhinitis      Depression     During grad school only, was situational. She received counseling weekly for a period of time. No suicidal ideation. No medicaiton needed.     Hx of previous reproductive problem     3 medicated IUIs, 2 rounds of IVF, 2 FETs, 1 chemical pregn     Rectal bleeding     had colonoscopy all normal, no recurrence     Right wrist fracture      - Risk for GDM -  does not Personal history of GDM, BMI>30, h/o prediabetes/glucose intolerance, first degree relative with GDM or DM    WILL NOT have an early GCT and possible Hgb A1C    - The patient does not h/o Pre Eclampsia, Current multi fetal gestation, Pre Gestational Diabetes (Type 1 or Type 2), chronic hypertension, renal disease, Autoimmune disease (systematic lupus erythematosus, antiphospholipid syndrome) so WILL NOT start low dose aspirin (81mg) starting between 12 and 28 weeks to prevent preeclampsia.    - The patient  does not have a history of spontaneous  birth so  WILL NOT consider progesterone starting at 16-20 weeks and/or serial transvaginal cervical length ultrasounds from 16-24 weeks.         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   Additional items: Denies past or present domestic violence, sexual and psychological abuse.    Objective  -VS: reviewed and within normal limits   -General appearance: no acute distress, patient is comfortable   NEUROLOGICAL/PSYCHIATRIC   - Orientated x3,   -Mood and affect: : normal     Assessment/Plan  Isiah was seen today for prenatal care.    Diagnoses and all orders for this visit:    Supervision of pregnancy resulting from assisted reproductive technology  -     25- OH-Vitamin D  -     ABO/Rh Type and Screen  -     CBC with Platelets Differential  -     Hepatitis B Surface  Antigen  -     HIV Antigen Antibody Combo  -     Rubella Antibody IgG Quantitative  -     Treponema Abs w Reflex to RPR and Titer  -     Urine Culture Aerobic Bacterial  -     Hepatitis B Surface Antibody  -     Varicella Zoster Virus Antibody IgG  -     Cancel: US OB < 14 Weeks Single; Future    Vaginal bleeding affecting early pregnancy  -     US OB < 14 Weeks Single; Future    38 year old  10w1d weeks of pregnancy with MARTHA of Oct 13, 2019 by FET and Ultrasound confirms.   Outpatient Encounter Medications as of 3/18/2019   Medication Sig Dispense Refill     aspirin (ASA) 81 MG tablet 1 tablet daily       calcium citrate-vitamin D (CITRACAL) 315-200 MG-UNIT TABS per tablet Take 1 tablet by mouth 2 times daily       Docosahexaenoic Acid (DHA NATURAL OMEGA-3 PO) Take 1 tablet by mouth daily       PRENATAL 27-1 MG TABS Take 1 tablet by mouth daily       Simethicone (GAS-X PO) Take 160 mg by mouth once       fexofenadine (ALLEGRA) 180 MG tablet Take 1 tablet (180 mg) by mouth as needed (Patient not taking: Reported on 3/18/2019) 90 tablet 3     fluticasone (VERAMYST) 27.5 MCG/SPRAY spray Spray 2 sprays into both nostrils daily (Patient not taking: Reported on 3/18/2019) 30 g 4     montelukast (SINGULAIR) 10 MG tablet Take 1 tablet (10 mg) by mouth At Bedtime (Patient not taking: Reported on 3/18/2019) 90 tablet 2     promethazine (PHENERGAN) 25 MG suppository 25 mg as needed  1     [DISCONTINUED] choriogonadotropin ross (OVIDREL) 250 MCG/0.5ML injection Inject 250 mcg Subcutaneous once       [DISCONTINUED] LETROZOLE PO Take 5 mg by mouth daily       [DISCONTINUED] progesterone (PROMETRIUM) 100 MG capsule Take 100 mg by mouth 2 times daily       [DISCONTINUED] zolpidem (AMBIEN) 10 MG tablet Take 1 tablet (10 mg) by mouth nightly as needed for sleep 20 tablet 0     No facility-administered encounter medications on file as of 3/18/2019.       Orders Placed This Encounter   Procedures     US OB < 14 Weeks Single      25- OH-Vitamin D     CBC with Platelets Differential     Hepatitis B Surface Antigen     HIV Antigen Antibody Combo     Rubella Antibody IgG Quantitative     Treponema Abs w Reflex to RPR and Titer     Hepatitis B Surface Antibody     Varicella Zoster Virus Antibody IgG     ABO/Rh Type and Screen     - Oriented to Practice, types of care, and how to reach a provider.  Pt prefers CNM vs MD: Unsure, will let us know at next appt.  - Patient received 1st trimester new OB education packet complete with aide of The Expectant Family booklet including information on genetic screening test options.  - Patient desires 1st trimester screening which was ordered.  - Reviewed appropriate TWG in pregnancy given higher starting BMI.   - Patient was encouraged to start prenatal vitamins as tolerated.    - Unable to hear FHTs today and no US performed prior to the appt. Pt requests US with radiology to confirm FHTs.   - Recommend establishing with an allergist as her seasonal allergies are quite bad and she has discontinued all of her allergy medications. Reviewed allergy meds safe to take in pregnancy and provided handout.   - Patient was sent to lab for routine OB labs including varicella.       - Pregnancy concerns to be addressed by provider at new OB exam include: Needs GC/CT and need to find record of PAPs (these were NOT in the CCRM files).    Pt to RTO for NOB visit in 2 weeks and prn if questions or concerns    Ynes Reid CNM          Again, thank you for allowing me to participate in the care of your patient.      Sincerely,    Ynes Reid CNM

## 2019-03-19 LAB
DEPRECATED CALCIDIOL+CALCIFEROL SERPL-MC: 28 UG/L (ref 20–75)
HBV SURFACE AB SERPL IA-ACNC: 0 M[IU]/ML
HBV SURFACE AG SERPL QL IA: NONREACTIVE
HIV 1+2 AB+HIV1 P24 AG SERPL QL IA: NONREACTIVE
RUBV IGG SERPL IA-ACNC: 26 IU/ML
T PALLIDUM AB SER QL: NONREACTIVE
VZV IGG SER QL IA: 0.3 AI (ref 0–0.8)

## 2019-03-20 LAB
BACTERIA SPEC CULT: ABNORMAL
BACTERIA SPEC CULT: ABNORMAL
Lab: ABNORMAL
SPECIMEN SOURCE: ABNORMAL

## 2019-03-21 ENCOUNTER — TELEPHONE (OUTPATIENT)
Dept: OBGYN | Facility: CLINIC | Age: 39
End: 2019-03-21

## 2019-03-21 DIAGNOSIS — B95.1 POSITIVE GBS TEST: ICD-10-CM

## 2019-03-21 DIAGNOSIS — R82.71 GBS BACTERIURIA: Primary | ICD-10-CM

## 2019-03-21 RX ORDER — CEPHALEXIN 500 MG/1
500 CAPSULE ORAL 2 TIMES DAILY
Qty: 14 CAPSULE | Refills: 0 | Status: SHIPPED | OUTPATIENT
Start: 2019-03-21 | End: 2019-04-04

## 2019-03-21 ASSESSMENT — ENCOUNTER SYMPTOMS
HEARTBURN: 0
MYALGIAS: 0
NIGHT SWEATS: 0
POOR WOUND HEALING: 0
FATIGUE: 1
RECTAL PAIN: 0
MUSCLE CRAMPS: 0
WEIGHT GAIN: 0
NAIL CHANGES: 0
HALLUCINATIONS: 0
CONSTIPATION: 1
ABDOMINAL PAIN: 0
WEIGHT LOSS: 0
BACK PAIN: 0
INCREASED ENERGY: 1
JOINT SWELLING: 0
DIARRHEA: 0
POLYPHAGIA: 0
NECK PAIN: 0
JAUNDICE: 0
DECREASED APPETITE: 1
ARTHRALGIAS: 0
POLYDIPSIA: 0
CHILLS: 0
MUSCLE WEAKNESS: 0
BLOATING: 1
FEVER: 0
BOWEL INCONTINENCE: 0
NAUSEA: 1
VOMITING: 0
ALTERED TEMPERATURE REGULATION: 1
STIFFNESS: 0
SKIN CHANGES: 0
BLOOD IN STOOL: 0

## 2019-03-21 NOTE — TELEPHONE ENCOUNTER
Called to discuss GBS pos bacteruria with Isiah. Pt is agreeable to tx with Keflex.     Pt also inquires whether she should be concerned that she had a small amount of bleeding yesterday. She has had occasional spotting but yesterday on one occasional she had a small amount of red bleeding. Denies urinary or vaginal symptoms. No cramping. No recent intercourse. Otherwise feeling well. Discussed that decreased activity will not necessarily reduce risk for miscarriage, but that pt can consider decreasing activity for 1-2 days and following up if bleeding increases or if she develops accompanying symptoms such as cramping. Pt agrees to this plan. Pt is Rh positive.     CECELIA Berman CNM

## 2019-03-25 ENCOUNTER — PRE VISIT (OUTPATIENT)
Dept: MATERNAL FETAL MEDICINE | Facility: CLINIC | Age: 39
End: 2019-03-25

## 2019-03-29 ASSESSMENT — ANXIETY QUESTIONNAIRES
7. FEELING AFRAID AS IF SOMETHING AWFUL MIGHT HAPPEN: NOT AT ALL
2. NOT BEING ABLE TO STOP OR CONTROL WORRYING: SEVERAL DAYS
1. FEELING NERVOUS, ANXIOUS, OR ON EDGE: SEVERAL DAYS
4. TROUBLE RELAXING: NOT AT ALL
3. WORRYING TOO MUCH ABOUT DIFFERENT THINGS: SEVERAL DAYS
6. BECOMING EASILY ANNOYED OR IRRITABLE: NOT AT ALL
GAD7 TOTAL SCORE: 3
7. FEELING AFRAID AS IF SOMETHING AWFUL MIGHT HAPPEN: NOT AT ALL
GAD7 TOTAL SCORE: 3
5. BEING SO RESTLESS THAT IT IS HARD TO SIT STILL: NOT AT ALL

## 2019-03-30 ASSESSMENT — ANXIETY QUESTIONNAIRES: GAD7 TOTAL SCORE: 3

## 2019-04-01 ENCOUNTER — HOSPITAL ENCOUNTER (OUTPATIENT)
Dept: ULTRASOUND IMAGING | Facility: CLINIC | Age: 39
Discharge: HOME | End: 2019-04-01
Attending: ADVANCED PRACTICE MIDWIFE | Admitting: ADVANCED PRACTICE MIDWIFE
Payer: COMMERCIAL

## 2019-04-01 ENCOUNTER — HOSPITAL ENCOUNTER (OUTPATIENT)
Dept: LAB | Facility: CLINIC | Age: 39
End: 2019-04-01
Attending: ADVANCED PRACTICE MIDWIFE
Payer: COMMERCIAL

## 2019-04-01 ENCOUNTER — OFFICE VISIT (OUTPATIENT)
Dept: MATERNAL FETAL MEDICINE | Facility: CLINIC | Age: 39
End: 2019-04-01
Attending: ADVANCED PRACTICE MIDWIFE
Payer: COMMERCIAL

## 2019-04-01 DIAGNOSIS — O09.521 SUPERVISION OF ELDERLY MULTIGRAVIDA IN FIRST TRIMESTER: ICD-10-CM

## 2019-04-01 DIAGNOSIS — O09.521 SUPERVISION OF ELDERLY MULTIGRAVIDA IN FIRST TRIMESTER: Primary | ICD-10-CM

## 2019-04-01 DIAGNOSIS — O26.90 PREGNANCY RELATED CONDITION, ANTEPARTUM: ICD-10-CM

## 2019-04-01 DIAGNOSIS — O09.819 PREGNANCY RESULTING FROM IN VITRO FERTILIZATION, ANTEPARTUM: Primary | ICD-10-CM

## 2019-04-01 PROCEDURE — 40000791 ZZHCL STATISTIC VERIFI PRENATAL TRISOMY 21,18,13: Performed by: OBSTETRICS & GYNECOLOGY

## 2019-04-01 PROCEDURE — 96040 ZZH GENETIC COUNSELING, EACH 30 MINUTES: CPT | Mod: ZF | Performed by: GENETIC COUNSELOR, MS

## 2019-04-01 PROCEDURE — 36415 COLL VENOUS BLD VENIPUNCTURE: CPT | Performed by: OBSTETRICS & GYNECOLOGY

## 2019-04-01 PROCEDURE — 76813 OB US NUCHAL MEAS 1 GEST: CPT

## 2019-04-02 NOTE — PROGRESS NOTES
Please see ultrasound report under imaging tab for details on ultrasound performed today.    Elke Moscoso MD  , OB/GYN  Maternal-Fetal Medicine  [email or web address]  [phone] (Academic office)  [phone] (Pager)

## 2019-04-02 NOTE — PROGRESS NOTES
[facility] Fetal [facility]  Genetic Counseling Consult    Patient: Isiah Reina YOB: 1980   Date of Service: 19      Isiah Reina was seen at [facility] for genetic consultation to discuss the options for screening and testing for fetal chromosome abnormalities.  The indication for genetic counseling is advanced maternal age.        Impression/Plan:   1.  Isiah had an ultrasound and blood draw for NIPT (Innatal test through [facility]).  Results are expected within 7-10 days, and will be available in [place].  We will contact her to discuss the results, and a copy will be forwarded to the office of the referring OB provider.    2.  Maternal serum AFP (single marker screen) is recommended after 15 weeks to screen for open neural tube defects. A quad screen should not be performed.    3.  An 18-20 week comprehensive ultrasound is standard of care for all women 35 or older at delivery.    Pregnancy History:   /Parity:     Age at Delivery: 39 year old  MARTHA: 10/13/2019, by Est. Date of Conception  Gestational Age: 12w2d    No significant complications or exposures were reported in the current pregnancy.    This is an IVF pregnancy  o Isiah and Mikhail have experienced unexplained fertility which brought them to IVF. They had tried a couple rounds of IUI at [facility] which unfortunately was unsuccessful.   o This embryo was screened via preimplantation genetic screening (PGS) (called comprehensive chromosome screening or CCS at [facility]) prior to embryo transfer. The results were euploid, per patient, and the couple opted  to find out fetal female sex. We discussed the benefits and limitations of PGS. The American College of Obstetricians and Gynecologists (ACOG) states that PGS may still have false positives and false negatives, therefore, couples that completed PGS should still be offered the same screening and diagnostic  offerings as all other pregnancies.  o Frozen five transfer on 2019, use of couple's egg and sperm  o Isiah and Mikhail have one remaining frozen embryo.   o We discussed that fetal echocardiograms are typically recommended for In Vitro Fertilization (IVF) pregnancies. The average pregnancy has a 0.5-1.0% chance of a congenital heart defect. However, studies suggest an increased chance for a heart defect for IVF pregnancies, with estimates ranging from 1-3.3%. Fetal echocardiograms are typically performed at 20 to 24 weeks gestation.      Medical History:   Isiah s reported medical history is not expected to impact pregnancy management or risks to fetal development.       Family History:   A three-generation pedigree was obtained, and is scanned under the  Media  tab.   The following significant findings were reported by Isiah:    Isiah was adopted and so she has no known family history    Mikhail has a healthy twin brother    Mikhail's grandparents  of cancer but Isiah thought their ages were all in 60-80s when they were diagnosed.     Otherwise, the reported family history is negative for multiple miscarriages, stillbirths, birth defects, mental retardation, known genetic conditions, and consanguinity.       Carrier Screening:   The patient reports that the father of the pregnancy has  ancestry:      Cystic fibrosis is an autosomal recessive genetic condition that occurs with increased frequency in individuals of  ancestry and carrier screening for this condition is available.  In addition,  screening in the [facility] includes cystic fibrosis.    The patient reports that she is of  ancestry:      The hemoglobinopathies are a group of genetic blood diseases that occur with increased frequency in individuals of  ancestry and carrier screening for these conditions is available.  Carrier screening for the hemoglobinopathies includes a CBC with red blood cell indices, a  ferritin level, and a quantitative hemoglobin electrophoresis or HPLC.  In addition,  screening in the [facility] includes many of the hemoglobinopathies.      Expanded carrier screening for mutations in a large panel of genes associated with autosomal recessive conditions including cystic fibrosis, spinal muscular atrophy, and others, is now available.      The patient has had previous carrier screening for 176 conditions through the [employer] carrier panel at [facility], the results of which were negative.  A copy of the report was available for our review today. Both Isiah and Mikhail were carrier for some conditions on the list but they were not carrier for any of the same conditions.        Risk Assessment for Chromosome Conditions:   We explained that the risk for fetal chromosome abnormalities increases with maternal age. We discussed specific features of common chromosome abnormalities, including Down syndrome, trisomy 13, trisomy 18, and sex chromosome trisomies.      At age 39 at midtrimester, the risk to have a baby with Down syndrome is 1 in 98.     At age 39 at midtrimester, the risk to have a baby with any chromosome abnormality is 1 in 51.     Testing Options:   We discussed the following options:   Non-invasive Prenatal Testing (NIPT)    Maternal plasma cell-free DNA testing; first trimester ultrasound with nuchal translucency and nasal bone assessment is recommended, when appropriate    Screens for fetal trisomy 21, trisomy 13, trisomy 18, and sex chromosome aneuploidy    Cannot screen for open neural tube defects; maternal serum AFP after 15 weeks is recommended    Consent was obtained    Isiah asked some excellent questions about how this screening works and we discussed the methods. She asked specifically about the placental origin of the cell free DNA. We discussed that Isiah's understanding was correct and that we often think the placental origin is responsible for false  positives that occur with this screen. Since this screen is not sampling fetal DNA it is not diagnostic    Isiah also asked about a screen for triploidy since she knew the [facility] also did not screen for it. We discussed there is a specific type of NIPT we can offer (Panorama) that does screen for triploidy if we see clinical indications. Isiah felt comfortable with our typical NIPT today and we discussed why we use [employer]'s screen due to low failure rate etc. If clinical indications for triploidy do present we discussed that Panorama would be available.      Chorionic villus sampling (CVS)    Invasive procedure typically performed in the first trimester by which placental villi are obtained for the purpose of chromosome analysis and/or other prenatal genetic analysis    Diagnostic results; >99% sensitivity for fetal chromosome abnormalities    Cannot test for open neural tube defects; maternal serum AFP after 15 weeks is recommended     Genetic Amniocentesis    Invasive procedure typically performed in the second trimester by which amniotic fluid is obtained for the purpose of chromosome analysis and/or other prenatal genetic analysis    Diagnostic results; >99% sensitivity for fetal chromosome abnormalities    AFAFP measurement tests for open neural tube defects     Comprehensive (Level II) ultrasound: Detailed ultrasound performed between 18-22 weeks gestation to screen for major birth defects and markers for aneuploidy.    We reviewed the benefits and limitations of this testing.  Screening tests provide a risk assessment specific to the pregnancy for certain fetal chromosome abnormalities, but cannot definitively diagnose or exclude a fetal chromosome abnormality.  Follow-up genetic counseling and consideration of diagnostic testing is recommended with any abnormal screening result.     Diagnostic tests carry inherent risks- including risk of miscarriage- that require careful consideration.  These tests can  detect fetal chromosome abnormalities with greater than 99% certainty.  Results can be compromised by maternal cell contamination or mosaicism, and are limited by the resolution of cytogenetic G-banding technology.  There is no screening nor diagnostic test that can detect all forms of birth defects or mental disability.     It was a pleasure to be involved with Donde s care. Face-to-face time of the meeting was 45 minutes.    María Raymundo MS, [facility]  Licensed Genetic Counselor   [facility]   [facility] Centers  [email or web address]   Office: [phone] MF: [phone]  Pager: [phone] Fax: [phone]

## 2019-04-04 ENCOUNTER — OFFICE VISIT (OUTPATIENT)
Dept: OBGYN | Facility: CLINIC | Age: 39
End: 2019-04-04
Attending: ADVANCED PRACTICE MIDWIFE
Payer: COMMERCIAL

## 2019-04-04 VITALS
BODY MASS INDEX: 29.56 KG/M2 | WEIGHT: 177.4 LBS | DIASTOLIC BLOOD PRESSURE: 75 MMHG | SYSTOLIC BLOOD PRESSURE: 111 MMHG | HEIGHT: 65 IN | HEART RATE: 69 BPM

## 2019-04-04 DIAGNOSIS — O20.9 VAGINAL BLEEDING AFFECTING EARLY PREGNANCY: ICD-10-CM

## 2019-04-04 DIAGNOSIS — O09.819 PREGNANCY RESULTING FROM IN VITRO FERTILIZATION, ANTEPARTUM: ICD-10-CM

## 2019-04-04 DIAGNOSIS — O09.529 ANTEPARTUM MULTIGRAVIDA OF ADVANCED MATERNAL AGE: Primary | ICD-10-CM

## 2019-04-04 DIAGNOSIS — B95.1 POSITIVE GBS TEST: ICD-10-CM

## 2019-04-04 LAB
CLUE CELLS: NEGATIVE
TRICHOMONAS (WET PREP): NEGATIVE
YEAST (WET PREP): NEGATIVE

## 2019-04-04 PROCEDURE — 87088 URINE BACTERIA CULTURE: CPT | Performed by: ADVANCED PRACTICE MIDWIFE

## 2019-04-04 PROCEDURE — 87186 SC STD MICRODIL/AGAR DIL: CPT | Performed by: ADVANCED PRACTICE MIDWIFE

## 2019-04-04 PROCEDURE — 87086 URINE CULTURE/COLONY COUNT: CPT | Performed by: ADVANCED PRACTICE MIDWIFE

## 2019-04-04 PROCEDURE — 87591 N.GONORRHOEAE DNA AMP PROB: CPT | Performed by: ADVANCED PRACTICE MIDWIFE

## 2019-04-04 PROCEDURE — 87491 CHLMYD TRACH DNA AMP PROBE: CPT | Performed by: ADVANCED PRACTICE MIDWIFE

## 2019-04-04 PROCEDURE — 87210 SMEAR WET MOUNT SALINE/INK: CPT | Mod: ZF | Performed by: ADVANCED PRACTICE MIDWIFE

## 2019-04-04 PROCEDURE — G0463 HOSPITAL OUTPT CLINIC VISIT: HCPCS

## 2019-04-04 ASSESSMENT — MIFFLIN-ST. JEOR: SCORE: 1477.62

## 2019-04-04 NOTE — LETTER
"2019       RE: Isiah Reina   [facility] [address]     Dear Colleague,    Thank you for referring your patient, Isiah Reina, to the [facility] at [facility]. Please see a copy of my visit note below.    SUBJECTIVE:    38 year old, female, , 12w4d,  who presents to the clinic today for a new ob visit.    Feels well. Has started PNV.  Estimated Date of Delivery: Oct 13, 2019   Oct 13, 2019 is calculated from No LMP recorded. Patient is pregnant..      She has had bleeding since her LMP.  The bleeding  is brown, in scant amounts noted when in bathroom wiping. , on \"a few\" occasions, and is at times accompanied by cramping. She is having occasional cramping - unsure if it's at the same time. Not currently having sex.  Pap up to date but agreeable to wet prep and GC/CT with speculum   She has had nausea.  Weight loss has not occurred. Nausea got better and then worse the last few days.  Bananas, grape, rice are her main foods.  Taking B6 and unisom at night - agreeable to try B6 during day as well.   This was a planned pregnancy.   FOB is involved,  Not present.      OTHER CONCERNS:   - Questions around using home doppler  - Questions anterior placenta  - Questions around age related risks  - Does Spinning and running for exercise.  - Questions around timing of anatomy scan and then fetal echo at different gestational age   - Still undecided CNM vs MD, will continue to consider    ===========================================  ROS  PSYCHIATRIC:  No medications for anxiety or dpression.  3 years in therapy during grad school ~10 years ago.  Couples therapy started last week.   Answers for HPI/ROS submitted by the patient on 3/21/2019   NORMAN 7 TOTAL SCORE: 3  Social History     Tobacco Use     Smoking status: Never Smoker     Smokeless tobacco: Never Used   Substance Use Topics     Alcohol use: Yes     Alcohol/week: 0.6 oz     " Frequency: Monthly or less     Drinks per session: 1 or 2     Binge frequency: Never     Comment: stopped in ifv   and pregnancy     History   Drug Use No     History   Smoking Status     Never Smoker   Smokeless Tobacco     Never Used     Social History    Substance and Sexual Activity      Alcohol use: Yes        Alcohol/week: 0.6 oz        Frequency: Monthly or less        Drinks per session: 1 or 2        Binge frequency: Never        Comment: stopped in ifv   and pregnancy    Family History   Adopted: Yes     ============================================  MEDICAL HISTORY   Allergies   Allergen Reactions     Ibuprofen Anaphylaxis           Current Outpatient Medications:      calcium citrate-vitamin D (CITRACAL) 315-200 MG-UNIT TABS per tablet, Take 1 tablet by mouth 2 times daily, Disp: , Rfl:      Docosahexaenoic Acid (DHA NATURAL OMEGA-3 PO), Take 1 tablet by mouth daily, Disp: , Rfl:      PRENATAL 27-1 MG TABS, Take 1 tablet by mouth daily, Disp: , Rfl:      aspirin (ASA) 81 MG tablet, 1 tablet daily, Disp: , Rfl:      fexofenadine (ALLEGRA) 180 MG tablet, Take 1 tablet (180 mg) by mouth as needed (Patient not taking: Reported on 3/18/2019), Disp: 90 tablet, Rfl: 3     fluticasone (VERAMYST) 27.5 MCG/SPRAY spray, Spray 2 sprays into both nostrils daily (Patient not taking: Reported on 3/18/2019), Disp: 30 g, Rfl: 4     montelukast (SINGULAIR) 10 MG tablet, Take 1 tablet (10 mg) by mouth At Bedtime (Patient not taking: Reported on 3/18/2019), Disp: 90 tablet, Rfl: 2     promethazine (PHENERGAN) 25 MG suppository, 25 mg as needed, Disp: , Rfl: 1     Simethicone (GAS-X PO), Take 160 mg by mouth once, Disp: , Rfl:     Past Medical History:   Diagnosis Date     Allergic rhinitis      Depression     During grad school only, was situational. She received counseling weekly for a period of time. No suicidal ideation. No medicaiton needed.     Hx of previous reproductive problem 2017    3 medicated IUIs, 2 rounds of  "IVF, 2 FETs, 1 chemical pregn     Rectal bleeding     had colonoscopy all normal, no recurrence     Right wrist fracture        Past Surgical History:   Procedure Laterality Date     COLONOSCOPY  ~10 years ago     ivf   egg retreival      times 2            Obstetric History       T0      L0     SAB0   TAB0   Ectopic0   Multiple0   Live Births0       # Outcome Date GA Lbr Tyrell/2nd Weight Sex Delivery Anes PTL Lv   1 Current                   GYN History- No Abnormal Pap Smears                        Cervical procedures: no                        History of STI: no    I personally reviewed the past social/family/medical and surgical history on the date of service.   I reviewed lab work done at Intake visit with patient.    OBJECTIVE:   PHYSICAL EXAM:  /75   Pulse 69   Ht 1.638 m (5' 4.5\")   Wt 80.5 kg (177 lb 6.4 oz)   BMI 29.98 kg/m     BMI- Body mass index is 29.98 kg/m ., GENERAL:  Pleasant pregnant female, alert, cooperative  and well groomed.  SKIN:  Warm and dry, without lesions or rashes  HEAD: Symmetrical features.  MOUTH:  Buccal mucosa pink, moist without lesions.  Teeth in good repair.    NECK:  Thyroid without enlargement and nodules.  Lymph nodes not palpable.   LUNGS:  Clear to auscultation.  BREAST:    No dominant, fixed or suspicious masses are noted.  No skin or nipple changes or axillary nodes.   Nipples everted.      HEART:  RRR without murmur.  ABDOMEN: Soft without masses , tenderness or organomegaly.  No CVA tenderness.  Uterus palpable at size equal to dates.  No scars noted.. Fetal heart tones present.  MUSCULOSKELETAL:  Full range of motion  EXTREMITIES:  No edema. No significant varicosities.   PELVIC EXAM:  GENITALIA: EGBUS  External genitalia, Bartholin's glands, urethra & [place]'s glands:normal. Vulva reveals no erythema or lesions.         VAGINA:  pink, normal rugae and discharge, no lesions, good tone.   CERVIX:  smooth, without discharge or CMT.              "   UTERUS: Anteverted and Retroverted,  nontender 13 week size.   ADNEXA:  Without masses or tenderness.   RECTAL:  Normal appearance.  Digital exam deferred.  WET PREP:no trichomonas, monilia, or clue cells  GC/CHLAMYDIA CULTURE OBTAINED:YES    ASSESSMENT:  Intrauterine pregnancy 12w4d size is consistent with dates  Genetic Screening: First Trimester Screen  Encounter Diagnoses   Name Primary?     Antepartum multigravida of advanced maternal age Yes     Positive GBS test      Pregnancy resulting from in vitro fertilization, antepartum      Vaginal bleeding affecting early pregnancy         PLAN:  Orders Placed This Encounter   Procedures     Wet Prep POCT       - Reviewed use of triage nurse line and contacting the on-call provider after hours for an urgent need such as fever, vagina bleeding, bladder or vaginal infection, rupture of membranes,  or term labor.    - Reviewed best evidence for: weight gain for her weight and height for pregnancy:  RECOMMENDED WEIGHT GAIN: 15-25 lbs.   healthy diet and foods to avoid; exercise and activity during pregnancy;avoiding exposure to toxoplasmosis; and maintenance of a generally healthy lifestyle.   - Discussed the harms, benefits, side effects and alternative therapies for current prescribed and OTC medications.  - Reviewed anatomy scan vs fetal echo with more reliable results based on different gestational ages (anatomy scan better earlier for all views, fetal echo better 22-24 for larger heart for views)  - Reviewed exercise safety in pregnancy.    - Encouraged pt to access office doppler prn over home doppler.   - Reviewed age related risks of >35 for increase possibility of genetic disorders. Pregnancy issues noted with AMA >40.     - All pt's questions discussed and answered.  Pt verbalized understanding of and agreement to plan of care.     - Continue scheduled prenatal care and prn if questions or concerns    CECELIA Bertrand CNM

## 2019-04-04 NOTE — PROGRESS NOTES
"SUBJECTIVE:    38 year old, female, , 12w4d,  who presents to the clinic today for a new ob visit.    Feels well. Has started PNV.  Estimated Date of Delivery: Oct 13, 2019   Oct 13, 2019 is calculated from No LMP recorded. Patient is pregnant..      She has had bleeding since her LMP.  The bleeding  is brown, in scant amounts noted when in bathroom wiping. , on \"a few\" occasions, and is at times accompanied by cramping. She is having occasional cramping - unsure if it's at the same time. Not currently having sex.  Pap up to date but agreeable to wet prep and GC/CT with speculum   She has had nausea.  Weight loss has not occurred. Nausea got better and then worse the last few days.  Bananas, grape, rice are her main foods.  Taking B6 and unisom at night - agreeable to try B6 during day as well.   This was a planned pregnancy.   FOB is involved,  Not present.      OTHER CONCERNS:   - Questions around using home doppler  - Questions anterior placenta  - Questions around age related risks  - Does Spinning and running for exercise.  - Questions around timing of anatomy scan and then fetal echo at different gestational age   - Still undecided CNM vs MD, will continue to consider    ===========================================  ROS  PSYCHIATRIC:  No medications for anxiety or dpression.  3 years in therapy during grad school ~10 years ago.  Couples therapy started last week.   Answers for HPI/ROS submitted by the patient on 3/21/2019   NORMAN 7 TOTAL SCORE: 3  Social History     Tobacco Use     Smoking status: Never Smoker     Smokeless tobacco: Never Used   Substance Use Topics     Alcohol use: Yes     Alcohol/week: 0.6 oz     Frequency: Monthly or less     Drinks per session: 1 or 2     Binge frequency: Never     Comment: stopped in ifv   and pregnancy     History   Drug Use No     History   Smoking Status     Never Smoker   Smokeless Tobacco     Never Used     Social History    Substance and Sexual Activity      " Alcohol use: Yes        Alcohol/week: 0.6 oz        Frequency: Monthly or less        Drinks per session: 1 or 2        Binge frequency: Never        Comment: stopped in ifv   and pregnancy    Family History   Adopted: Yes     ============================================  MEDICAL HISTORY   Allergies   Allergen Reactions     Ibuprofen Anaphylaxis           Current Outpatient Medications:      calcium citrate-vitamin D (CITRACAL) 315-200 MG-UNIT TABS per tablet, Take 1 tablet by mouth 2 times daily, Disp: , Rfl:      Docosahexaenoic Acid (DHA NATURAL OMEGA-3 PO), Take 1 tablet by mouth daily, Disp: , Rfl:      PRENATAL 27-1 MG TABS, Take 1 tablet by mouth daily, Disp: , Rfl:      aspirin (ASA) 81 MG tablet, 1 tablet daily, Disp: , Rfl:      fexofenadine (ALLEGRA) 180 MG tablet, Take 1 tablet (180 mg) by mouth as needed (Patient not taking: Reported on 3/18/2019), Disp: 90 tablet, Rfl: 3     fluticasone (VERAMYST) 27.5 MCG/SPRAY spray, Spray 2 sprays into both nostrils daily (Patient not taking: Reported on 3/18/2019), Disp: 30 g, Rfl: 4     montelukast (SINGULAIR) 10 MG tablet, Take 1 tablet (10 mg) by mouth At Bedtime (Patient not taking: Reported on 3/18/2019), Disp: 90 tablet, Rfl: 2     promethazine (PHENERGAN) 25 MG suppository, 25 mg as needed, Disp: , Rfl: 1     Simethicone (GAS-X PO), Take 160 mg by mouth once, Disp: , Rfl:     Past Medical History:   Diagnosis Date     Allergic rhinitis      Depression     During grad school only, was situational. She received counseling weekly for a period of time. No suicidal ideation. No medicaiton needed.     Hx of previous reproductive problem     3 medicated IUIs, 2 rounds of IVF, 2 FETs, 1 chemical pregn     Rectal bleeding     had colonoscopy all normal, no recurrence     Right wrist fracture        Past Surgical History:   Procedure Laterality Date     COLONOSCOPY  ~10 years ago     ivf   egg retreival      times 2            Obstetric History       T0  "     L0     SAB0   TAB0   Ectopic0   Multiple0   Live Births0       # Outcome Date GA Lbr Tyrell/2nd Weight Sex Delivery Anes PTL Lv   1 Current                   GYN History- No Abnormal Pap Smears                        Cervical procedures: no                        History of STI: no    I personally reviewed the past social/family/medical and surgical history on the date of service.   I reviewed lab work done at Intake visit with patient.    OBJECTIVE:   PHYSICAL EXAM:  /75   Pulse 69   Ht 1.638 m (5' 4.5\")   Wt 80.5 kg (177 lb 6.4 oz)   BMI 29.98 kg/m    BMI- Body mass index is 29.98 kg/m ., GENERAL:  Pleasant pregnant female, alert, cooperative  and well groomed.  SKIN:  Warm and dry, without lesions or rashes  HEAD: Symmetrical features.  MOUTH:  Buccal mucosa pink, moist without lesions.  Teeth in good repair.    NECK:  Thyroid without enlargement and nodules.  Lymph nodes not palpable.   LUNGS:  Clear to auscultation.  BREAST:    No dominant, fixed or suspicious masses are noted.  No skin or nipple changes or axillary nodes.   Nipples everted.      HEART:  RRR without murmur.  ABDOMEN: Soft without masses , tenderness or organomegaly.  No CVA tenderness.  Uterus palpable at size equal to dates.  No scars noted.. Fetal heart tones present.  MUSCULOSKELETAL:  Full range of motion  EXTREMITIES:  No edema. No significant varicosities.   PELVIC EXAM:  GENITALIA: EGBUS  External genitalia, Bartholin's glands, urethra & [place]'s glands:normal. Vulva reveals no erythema or lesions.         VAGINA:  pink, normal rugae and discharge, no lesions, good tone.   CERVIX:  smooth, without discharge or CMT.                UTERUS: Anteverted and Retroverted,  nontender 13 week size.   ADNEXA:  Without masses or tenderness.   RECTAL:  Normal appearance.  Digital exam deferred.  WET PREP:no trichomonas, monilia, or clue cells  GC/CHLAMYDIA CULTURE OBTAINED:YES    ASSESSMENT:  Intrauterine pregnancy 12w4d size is " consistent with dates  Genetic Screening: First Trimester Screen  Encounter Diagnoses   Name Primary?     Antepartum multigravida of advanced maternal age Yes     Positive GBS test      Pregnancy resulting from in vitro fertilization, antepartum      Vaginal bleeding affecting early pregnancy         PLAN:  Orders Placed This Encounter   Procedures     Wet Prep POCT       - Reviewed use of triage nurse line and contacting the on-call provider after hours for an urgent need such as fever, vagina bleeding, bladder or vaginal infection, rupture of membranes,  or term labor.    - Reviewed best evidence for: weight gain for her weight and height for pregnancy:  RECOMMENDED WEIGHT GAIN: 15-25 lbs.   healthy diet and foods to avoid; exercise and activity during pregnancy;avoiding exposure to toxoplasmosis; and maintenance of a generally healthy lifestyle.   - Discussed the harms, benefits, side effects and alternative therapies for current prescribed and OTC medications.  - Reviewed anatomy scan vs fetal echo with more reliable results based on different gestational ages (anatomy scan better earlier for all views, fetal echo better 22-24 for larger heart for views)  - Reviewed exercise safety in pregnancy.    - Encouraged pt to access office doppler prn over home doppler.   - Reviewed age related risks of >35 for increase possibility of genetic disorders. Pregnancy issues noted with AMA >40.     - All pt's questions discussed and answered.  Pt verbalized understanding of and agreement to plan of care.     - Continue scheduled prenatal care and prn if questions or concerns    CECELIA Bertrand CNM

## 2019-04-05 ENCOUNTER — TELEPHONE (OUTPATIENT)
Dept: MATERNAL FETAL MEDICINE | Facility: CLINIC | Age: 39
End: 2019-04-05

## 2019-04-05 LAB
C TRACH DNA SPEC QL NAA+PROBE: NEGATIVE
N GONORRHOEA DNA SPEC QL NAA+PROBE: NEGATIVE
SPECIMEN SOURCE: NORMAL
SPECIMEN SOURCE: NORMAL

## 2019-04-05 NOTE — TELEPHONE ENCOUNTER
Called and discussed normal NIPT results with Isiah. Results indicate NO ANEUPLOIDY DETECTED for chromosomes 21, 18, 13, or sex chromosomes (XX). Sex was disclosed, matches known sex by PGS     This puts her current pregnancy at low risk for Down syndrome, trisomy 18, trisomy 13 and sex chromosome abnormalities. This test is reported to have the following sensitivities: Down syndrome- 99%, trisomy 18- 98%, and trisomy 13- 98%. Although these results are reassuring, this does not replace a standard chromosome analysis from a chorionic villus sampling or amniocentesis.     Level II ultrasound is recommended, given AMA and IVF pregnancy.    MSAFP is the appropriate second trimester screening test for open neural tube defects; the maternal quad screen is not recommended.    Her results are available in her Epic chart for her primary OB to review.    María Raymundo MS, [facility]  Licensed Genetic Counselor   [facility]   [facility]  [email or web address]   Office: [phone] MFM: [phone]  Pager: [phone] Fax: [phone]

## 2019-04-06 DIAGNOSIS — R82.71 ANTEPARTUM ASYMPTOMATIC BACTERIURIA: Primary | ICD-10-CM

## 2019-04-06 DIAGNOSIS — O99.891 ANTEPARTUM ASYMPTOMATIC BACTERIURIA: Primary | ICD-10-CM

## 2019-04-06 RX ORDER — AMOXICILLIN 500 MG/1
500 CAPSULE ORAL 3 TIMES DAILY
Qty: 15 CAPSULE | Refills: 0 | Status: SHIPPED | OUTPATIENT
Start: 2019-04-06 | End: 2019-05-02

## 2019-04-07 LAB
BACTERIA SPEC CULT: ABNORMAL
Lab: ABNORMAL
SPECIMEN SOURCE: ABNORMAL

## 2019-04-08 LAB — LAB SCANNED RESULT: NORMAL

## 2019-04-17 ENCOUNTER — OFFICE VISIT (OUTPATIENT)
Dept: OBGYN | Facility: CLINIC | Age: 39
End: 2019-04-17
Payer: COMMERCIAL

## 2019-04-17 DIAGNOSIS — Z34.02 SUPERVISION OF NORMAL FIRST PREGNANCY IN SECOND TRIMESTER: Primary | ICD-10-CM

## 2019-04-17 PROCEDURE — 40000269 ZZH STATISTIC NO CHARGE FACILITY FEE: Mod: ZF

## 2019-04-17 NOTE — PROGRESS NOTES
Pt presented to clinic for fetal heart tone check for reassurance - .  Pt reports she will be going to [place] tomorrow. Discussed staying well hydrated and if not able to get up and walk every hour on the plane, do foot exercises to prevent blood clots. She indicated understanding and agreed with plan.

## 2019-05-02 ENCOUNTER — OFFICE VISIT (OUTPATIENT)
Dept: OBGYN | Facility: CLINIC | Age: 39
End: 2019-05-02
Attending: ADVANCED PRACTICE MIDWIFE
Payer: COMMERCIAL

## 2019-05-02 VITALS
HEART RATE: 85 BPM | SYSTOLIC BLOOD PRESSURE: 123 MMHG | HEIGHT: 65 IN | BODY MASS INDEX: 29.66 KG/M2 | WEIGHT: 178 LBS | DIASTOLIC BLOOD PRESSURE: 82 MMHG

## 2019-05-02 DIAGNOSIS — O09.529 ANTEPARTUM MULTIGRAVIDA OF ADVANCED MATERNAL AGE: Primary | ICD-10-CM

## 2019-05-02 DIAGNOSIS — O09.819 PREGNANCY RESULTING FROM IN VITRO FERTILIZATION, ANTEPARTUM: ICD-10-CM

## 2019-05-02 PROCEDURE — 87086 URINE CULTURE/COLONY COUNT: CPT | Performed by: ADVANCED PRACTICE MIDWIFE

## 2019-05-02 PROCEDURE — G0463 HOSPITAL OUTPT CLINIC VISIT: HCPCS | Mod: ZF

## 2019-05-02 ASSESSMENT — MIFFLIN-ST. JEOR: SCORE: 1480.34

## 2019-05-02 ASSESSMENT — PAIN SCALES - GENERAL: PAINLEVEL: NO PAIN (0)

## 2019-05-02 NOTE — LETTER
"  RE: Isiah Reina   [facility] [address]     Dear Colleague,    Thank you for referring your patient, Isaih Reina, to the [facility] at [facility]. Please see a copy of my visit note below.    Subjective:      38 year old  at 16w4d presents for a routine prenatal appointment.         Denies cramping/contractions, vaginal bleeding, discharge or leakage of fluid. No fetal movement yet.   No HA, vision changes, ruq/epigastric pain.       Patient concerns: Feeling well overall. Nausea has improved.   Has several questions:  - Inquiring about doulas. Looking into [place] [facility].   - Wondering about exercise in pregnancy and what heart rate is too high  - Rare eye twitching- maybe associated with when she is tired. No vision changes.     Has Level 2 ultrasound scheduled on 19 and fetal echocardiogram d/t IVF pregnancy scheduled for 19.   Desires AFP today. Would like printed copy of NIPT results.     Pt is a part of the group prenatal care.     Objective:  Vitals:    19 0800   BP: 123/82   Pulse: 85   Weight: 80.7 kg (178 lb)   Height: 1.638 m (5' 4.5\")         See OB flowsheet    Assessment/Plan     Encounter Diagnosis   Name Primary?     Antepartum multigravida of advanced maternal age Yes     Orders Placed This Encounter   Procedures     Alpha fetoprotein maternal screen     - Reviewed total weight gain, encouraged continued healthy diet and exercise.      - Reviewed why/how to contact provider.      Patient education/orders or handouts today:  PTL signs/symptoms, fetal movement and level 2 u/s scheduled    Discussed benefits of doulas in labor.  Reviewed exercise recommendations for pregnancy.  Recommended f/u with optometry or PCP if eye twitching persists.     Reviewed OB intake labs.  Recommended vitamin D supplementation of 5000 international unit(s)/day. Pt states she has this at home.  Discussed Hep " B nonimmune and starting vaccine series. Pt declines at this time.     Printed copy of NIPT results given to patient.  AFP ordered.  Level 2 ultrasound scheduled on 5/13/19.  Fetal echocardiogram d/t IVF pregnancy scheduled on 5/29/19.      Continue scheduled prenatal care, RTC in 4 weeks at [facility] prenatal care- already scheduled.    Again, thank you for allowing me to participate in the care of your patient.      Sincerely,    Tommie Bingham CNM

## 2019-05-02 NOTE — NURSING NOTE
Chief Complaint   Patient presents with     Prenatal Care     MALIA 16 weeks and 4 days   Татьяна Schwarz LPN

## 2019-05-02 NOTE — PROGRESS NOTES
"Subjective:      38 year old  at 16w4d presents for a routine prenatal appointment.         Denies cramping/contractions, vaginal bleeding, discharge or leakage of fluid. No fetal movement yet.   No HA, vision changes, ruq/epigastric pain.       Patient concerns: Feeling well overall. Nausea has improved.   Has several questions:  - Inquiring about doulas. Looking into [place] [facility].   - Wondering about exercise in pregnancy and what heart rate is too high  - Rare eye twitching- maybe associated with when she is tired. No vision changes.     Has Level 2 ultrasound scheduled on 19 and fetal echocardiogram d/t IVF pregnancy scheduled for 19.   Desires AFP today. Would like printed copy of NIPT results.     Pt is a part of the group prenatal care.     Objective:  Vitals:    19 0800   BP: 123/82   Pulse: 85   Weight: 80.7 kg (178 lb)   Height: 1.638 m (5' 4.5\")         See OB flowsheet    Assessment/Plan     Encounter Diagnosis   Name Primary?     Antepartum multigravida of advanced maternal age Yes     Orders Placed This Encounter   Procedures     Alpha fetoprotein maternal screen     - Reviewed total weight gain, encouraged continued healthy diet and exercise.      - Reviewed why/how to contact provider.      Patient education/orders or handouts today:  PTL signs/symptoms, fetal movement and level 2 u/s scheduled    Discussed benefits of doulas in labor.  Reviewed exercise recommendations for pregnancy.  Recommended f/u with optometry or PCP if eye twitching persists.     Reviewed OB intake labs.  Recommended vitamin D supplementation of 5000 international unit(s)/day. Pt states she has this at home.  Discussed Hep B nonimmune and starting vaccine series. Pt declines at this time.     Printed copy of NIPT results given to patient.  AFP ordered.  Level 2 ultrasound scheduled on 19.  Fetal echocardiogram d/t IVF pregnancy scheduled on 19.      Continue scheduled prenatal care, " RTC in 4 weeks at [facility] group prenatal care- already scheduled.      CECELIA Quijano, ANA

## 2019-05-03 LAB
BACTERIA SPEC CULT: NORMAL
Lab: NORMAL
SPECIMEN SOURCE: NORMAL

## 2019-05-08 DIAGNOSIS — O09.529 ANTEPARTUM MULTIGRAVIDA OF ADVANCED MATERNAL AGE: ICD-10-CM

## 2019-05-08 DIAGNOSIS — Z01.89 LABORATORY TEST: Primary | ICD-10-CM

## 2019-05-08 PROCEDURE — 82105 ALPHA-FETOPROTEIN SERUM: CPT | Performed by: ADVANCED PRACTICE MIDWIFE

## 2019-05-08 NOTE — PROGRESS NOTES
Performed venipuncture for Dr. Bingham's patient.  Specimen get send to [facility].    Mela Lambert CMA  May 8, 2019 2:17 PM

## 2019-05-12 LAB
# FETUSES US: NORMAL
# FETUSES: 1
AFP ADJ MOM AMN: 1.29
AFP SERPL-MCNC: 40 NG/ML
AGE - REPORTED: 39.4 YR
CURRENT SMOKER: NO
CURRENT SMOKER: NO
DIABETES STATUS PATIENT: NO
FAMILY MEMBER DISEASES HX: NO
FAMILY MEMBER DISEASES HX: NO
GA METHOD: NORMAL
GA METHOD: NORMAL
GA: NORMAL WK
IDDM PATIENT QL: NO
INTEGRATED SCN PATIENT-IMP: NORMAL
LMP START DATE: NORMAL
MONOCHORIONIC TWINS: NO
SERVICE CMNT-IMP: NO
SPECIMEN DRAWN SERPL: NORMAL
VALPROIC/CARBAMAZEPINE STATUS: NO
WEIGHT UNITS: NORMAL

## 2019-05-13 ENCOUNTER — HOSPITAL ENCOUNTER (OUTPATIENT)
Dept: ULTRASOUND IMAGING | Facility: CLINIC | Age: 39
Discharge: HOME | End: 2019-05-13
Attending: OBSTETRICS & GYNECOLOGY | Admitting: OBSTETRICS & GYNECOLOGY
Payer: COMMERCIAL

## 2019-05-13 ENCOUNTER — OFFICE VISIT (OUTPATIENT)
Dept: MATERNAL FETAL MEDICINE | Facility: CLINIC | Age: 39
End: 2019-05-13
Attending: OBSTETRICS & GYNECOLOGY
Payer: COMMERCIAL

## 2019-05-13 DIAGNOSIS — O09.819 PREGNANCY RESULTING FROM IN VITRO FERTILIZATION, ANTEPARTUM: ICD-10-CM

## 2019-05-13 DIAGNOSIS — O09.512 AMA (ADVANCED MATERNAL AGE) PRIMIGRAVIDA 35+, SECOND TRIMESTER: Primary | ICD-10-CM

## 2019-05-13 PROCEDURE — 76811 OB US DETAILED SNGL FETUS: CPT

## 2019-05-13 NOTE — PROGRESS NOTES
"Please see \"Imaging\" tab under \"Chart Review\" for details of today's visit.    Claudia Layton    "

## 2019-06-05 ENCOUNTER — OFFICE VISIT (OUTPATIENT)
Dept: OBGYN | Facility: CLINIC | Age: 39
End: 2019-06-05
Attending: ADVANCED PRACTICE MIDWIFE
Payer: COMMERCIAL

## 2019-06-05 DIAGNOSIS — O09.529 ANTEPARTUM MULTIGRAVIDA OF ADVANCED MATERNAL AGE: Primary | ICD-10-CM

## 2019-06-05 DIAGNOSIS — O12.00 SWELLING OF LOWER EXTREMITY DURING PREGNANCY, ANTEPARTUM: ICD-10-CM

## 2019-06-05 DIAGNOSIS — O09.819 PREGNANCY RESULTING FROM IN VITRO FERTILIZATION, ANTEPARTUM: ICD-10-CM

## 2019-06-05 DIAGNOSIS — B95.1 POSITIVE GBS TEST: ICD-10-CM

## 2019-06-05 PROCEDURE — G0463 HOSPITAL OUTPT CLINIC VISIT: HCPCS | Mod: ZF

## 2019-06-05 NOTE — NURSING NOTE
Chief Complaint   Patient presents with     Prenatal Care   21.3 week [facility] pregnancy class

## 2019-06-05 NOTE — LETTER
"2019       RE: Isiah Reina   [facility] [address]     Dear Colleague,    Thank you for referring your patient, Isiah Reina, to the [facility] at [facility]. Please see a copy of my visit note below.    Subjective:      39 year old  at 21w3d presents for Mindfulness Group Prenatal Care Session #1 with partner Mikhail     No vaginal bleeding or leakage of fluid.  No contractions. + fetal movement.   No HA, visual changes, RUQ or epigastric pain.     Patient concerns: Feeling well overall.  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  Wasn't eating snack or drinking as much water. Will work on pre/post snack and increasing PO hydration.     Objective:  Vitals:    19 1022   BP: 127/73   Pulse: 64   Weight: 82.6 kg (182 lb 1.6 oz)   Height: 1.638 m (5' 4.49\")   See Ob flowsheet  Assessment/Plan     Encounter Diagnoses   Name Primary?     Antepartum multigravida of advanced maternal age Yes     Swelling of lower extremity during pregnancy, antepartum      Positive GBS test      Pregnancy resulting from in vitro fertilization, antepartum      Orders Placed This Encounter   Procedures     Compression Hose Maternity       ABO   Date Value Ref Range Status   2019 A  Final     RH(D)   Date Value Ref Range Status   2019 Pos  Final     Antibody Screen   Date Value Ref Range Status   2019 Neg  Final   Rhogam is not indicated.     - Rx for compression socks and medical supply store info provided. Discussed options to buy OTC as well.     - Pt and pt's Mikhail signed confidentiality waivers which are kept on file. Diana participated in group prenatal discussion at which time all pt's and Mikhail's questions were discussed and answered.  Miles and Mikhail " participated in relaxation techniques and guided mediation for relaxation.     - Reviewed total weight gain, encouraged continued healthy diet and exercise.      -  Reviewed importance of daily fetal kick count and why/how to contact provider.    - Reviewed why/how to contact provider if headache/visual changes/RUQ or epigastric pain, decreased fetal movement, vaginal bleeding, leakage of fluid or more than 4 contractions in an hour.     Patient education/orders or handouts today:PTL signs/symptoms, pain management in labor, hospital protocols/policies, and CNM call coverage.   Reviewed EOB labs and Tdap will be scheduled for MALIA or outpatient lab appointment.     Reviewed travel safety including increased hydration, compression socks/staying mobile, access to prenatal records, and identifying access to emergency services.  Given letter for travel clearance.    Return to clinic in 4 weeks for next Mindfulness Prenatal Group and prn if questions or concerns.     I, JESSE Villalobos am serving as a scribe; to document services personally performed by  Jazmín Sharpe CNM based on data collection and the provider's statements to me.     JESSE Villalobos        The encounter was performed by me and scribed by the SNM. The scribed note accurately reflects my personal services and decisions made by me.  Jazmín RAI CNM

## 2019-06-05 NOTE — PROGRESS NOTES
"Subjective:      39 year old  at 21w3d presents for Mindfulness Group Prenatal Care Session #1 with partner Mikhail     No vaginal bleeding or leakage of fluid.  No contractions. + fetal movement.   No HA, visual changes, RUQ or epigastric pain.     Patient concerns: Feeling well overall.  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  Wasn't eating snack or drinking as much water. Will work on pre/post snack and increasing PO hydration.     Objective:  Vitals:    19 1022   BP: 127/73   Pulse: 64   Weight: 82.6 kg (182 lb 1.6 oz)   Height: 1.638 m (5' 4.49\")   See Ob flowsheet  Assessment/Plan     Encounter Diagnoses   Name Primary?     Antepartum multigravida of advanced maternal age Yes     Swelling of lower extremity during pregnancy, antepartum      Positive GBS test      Pregnancy resulting from in vitro fertilization, antepartum      Orders Placed This Encounter   Procedures     Compression Hose Maternity       ABO   Date Value Ref Range Status   2019 A  Final     RH(D)   Date Value Ref Range Status   2019 Pos  Final     Antibody Screen   Date Value Ref Range Status   2019 Neg  Final   Rhogam is not indicated.     - Rx for compression socks and medical supply store info provided. Discussed options to buy OTC as well.     - Pt and pt's Mikhail signed confidentiality waivers which are kept on file. Pt and Mikhail participated in group prenatal discussion at which time all pt's and Mikhail's questions were discussed and answered.  Pt and Mikhail participated in relaxation techniques and guided mediation for relaxation.     - Reviewed total weight gain, encouraged continued healthy diet and exercise.      -  Reviewed importance of daily fetal kick count and why/how to contact provider.    - Reviewed why/how to contact provider if " headache/visual changes/RUQ or epigastric pain, decreased fetal movement, vaginal bleeding, leakage of fluid or more than 4 contractions in an hour.     Patient education/orders or handouts today:PTL signs/symptoms, pain management in labor, hospital protocols/policies, and CNM call coverage.   Reviewed EOB labs and Tdap will be scheduled for MALIA or outpatient lab appointment.     Reviewed travel safety including increased hydration, compression socks/staying mobile, access to prenatal records, and identifying access to emergency services.  Given letter for travel clearance.    Return to clinic in 4 weeks for next Mindfulness Prenatal Group and prn if questions or concerns.     I, JESSE Villalobos am serving as a scribe; to document services personally performed by  Jazmín Sharpe CNM based on data collection and the provider's statements to me.     JESSE Villalobos        The encounter was performed by me and scribed by the SNM. The scribed note accurately reflects my personal services and decisions made by me.  Jazmín RAI CNM

## 2019-06-06 VITALS
DIASTOLIC BLOOD PRESSURE: 73 MMHG | WEIGHT: 182.1 LBS | BODY MASS INDEX: 31.09 KG/M2 | HEART RATE: 64 BPM | HEIGHT: 64 IN | SYSTOLIC BLOOD PRESSURE: 127 MMHG

## 2019-06-06 ASSESSMENT — PAIN SCALES - GENERAL: PAINLEVEL: NO PAIN (0)

## 2019-06-06 ASSESSMENT — MIFFLIN-ST. JEOR: SCORE: 1493.75

## 2019-06-08 ENCOUNTER — TELEPHONE (OUTPATIENT)
Dept: OBGYN | Facility: CLINIC | Age: 39
End: 2019-06-08

## 2019-06-08 NOTE — TELEPHONE ENCOUNTER
39 year old  at 21w5d was out doing a run this morning and tripped over the sidewalk. Fell to the ground onto all fours.  Didn't hit front of her belly, rolled onto  her R side on the grass.  Some scraps on hands and knees.  No vaginal bleeding, LOF, or uterine cramping.  Positive blood type.  Usually feels some fetal movement throughout the day but it's sporadic, hasn't felt it in the fifteen minutes since it happened.   Would prefer not to have to come in as she overall feels ok.  Wants to try to eat, drink, and rest for the next hour or two and if no fetal movement will call back.       Reviewed [facility] currently on divert - pt aware this CNM will assist finding where she should be seen. [facility] may be closest.  Night shift charge RN contacted to see if able to be seen at [facility] for heart tones even if unit closed - will update day shift charge RN as change of shift.  If pt calls back wanting to be see, CNM may contact charge RN to see if available for case by case.    Reinforced with pt that is any cramping, vaginal bleeding, change in status needs to be seen and this writer will help arrange.    Pt and her partner verbalized understanding of and agreement to plan of care.      Jazmín RAI CNM

## 2019-06-12 ENCOUNTER — HOSPITAL ENCOUNTER (OUTPATIENT)
Dept: ULTRASOUND IMAGING | Facility: CLINIC | Age: 39
End: 2019-06-12
Attending: OBSTETRICS & GYNECOLOGY
Payer: COMMERCIAL

## 2019-06-12 ENCOUNTER — OFFICE VISIT (OUTPATIENT)
Dept: MATERNAL FETAL MEDICINE | Facility: CLINIC | Age: 39
End: 2019-06-12
Attending: OBSTETRICS & GYNECOLOGY
Payer: COMMERCIAL

## 2019-06-12 DIAGNOSIS — O09.512 AMA (ADVANCED MATERNAL AGE) PRIMIGRAVIDA 35+, SECOND TRIMESTER: ICD-10-CM

## 2019-06-12 DIAGNOSIS — O09.819 PREGNANCY RESULTING FROM IN VITRO FERTILIZATION, ANTEPARTUM: ICD-10-CM

## 2019-06-12 DIAGNOSIS — O09.819 PREGNANCY RESULTING FROM IN VITRO FERTILIZATION, ANTEPARTUM: Primary | ICD-10-CM

## 2019-06-12 NOTE — PROGRESS NOTES
Please refer to ultrasound report under 'Imaging' Studies of 'Chart Review' tabs.    Marcelo Soto M.D.

## 2019-06-25 ENCOUNTER — TELEPHONE (OUTPATIENT)
Dept: OBGYN | Facility: CLINIC | Age: 39
End: 2019-06-25

## 2019-06-25 ENCOUNTER — OFFICE VISIT (OUTPATIENT)
Dept: OBGYN | Facility: CLINIC | Age: 39
End: 2019-06-25
Attending: ADVANCED PRACTICE MIDWIFE
Payer: COMMERCIAL

## 2019-06-25 VITALS
HEIGHT: 64 IN | DIASTOLIC BLOOD PRESSURE: 73 MMHG | WEIGHT: 182.1 LBS | HEART RATE: 66 BPM | BODY MASS INDEX: 31.09 KG/M2 | SYSTOLIC BLOOD PRESSURE: 109 MMHG

## 2019-06-25 DIAGNOSIS — Z34.02 SUPERVISION OF NORMAL FIRST PREGNANCY IN SECOND TRIMESTER: Primary | ICD-10-CM

## 2019-06-25 DIAGNOSIS — Z11.3 SCREEN FOR STD (SEXUALLY TRANSMITTED DISEASE): ICD-10-CM

## 2019-06-25 DIAGNOSIS — K64.4 EXTERNAL HEMORRHOIDS: ICD-10-CM

## 2019-06-25 PROCEDURE — G0463 HOSPITAL OUTPT CLINIC VISIT: HCPCS | Mod: ZF

## 2019-06-25 PROCEDURE — 87491 CHLMYD TRACH DNA AMP PROBE: CPT | Performed by: ADVANCED PRACTICE MIDWIFE

## 2019-06-25 PROCEDURE — 87591 N.GONORRHOEAE DNA AMP PROB: CPT | Performed by: ADVANCED PRACTICE MIDWIFE

## 2019-06-25 PROCEDURE — 87210 SMEAR WET MOUNT SALINE/INK: CPT | Mod: ZF | Performed by: ADVANCED PRACTICE MIDWIFE

## 2019-06-25 ASSESSMENT — MIFFLIN-ST. JEOR: SCORE: 1486

## 2019-06-25 NOTE — LETTER
"2019       RE: Isiah Reina   [facility] [address]     Dear Colleague,    Thank you for referring your patient, Isiah Reina, to the [facility] at [facility]. Please see a copy of my visit note below.    Subjective:      39 year old  at 24w1d presents for a prenatal appointment.    Pt called the RN to report bleeding after bowel movement x2. RN had pt come in to be evaluated.   Pt denies any leakage of fluid.  Denies any contractions or cramping. Pt feels no low back pain. Pt feels really well overall.  Positive fetal movement.       No HA, visual changes, RUQ or epigastric pain.   The patient presents with the following concerns: Pt reports blood on the toilet paper after bowel movement x3 days now (today, yesterday, and the day before).  Thinks maybe was a drop of blood on her panty liner a fe days earlier. Though the blood was coming from vagina but couldn't really be sure.   Has hx of hemorrhoids that have bled. Bleeding has always been after a BM.  Pt has had recent constipation, but felt today's stool passed easily.  Pt does not know if she has any hemorrhoids. Has no complaints of rectal pain or itching.     Vaginal symptoms: Pt denies any discharge, no local irritation, no vulvar itching, no odor, no burning, no pain, no lesions, no bumps,no tears   Other associated symptoms: No fever, No abdominal pain, No pelvic pain, No diarrhea, No nausea and No vomiting.      Patient appears well, vital signs normal. Abdomen normal, soft   without tenderness, guarding, mass or organomegaly.   Objective:  Vitals:    19 1305   BP: 109/73   BP Location: Left arm   Patient Position: Chair   Pulse: 66   Weight: 82.6 kg (182 lb 1.6 oz)   Height: 1.626 m (5' 4\")       Exam:  Sterile Spec Exam:  Vulva: No external lesions, normal hair distribution, no adenopathy  Vagina: Moist, pink, no abnormal discharge, well rugated, no lesions. " No blood visible in the vault. (No blood on cervical genprobe either) No fluid in the vault.   Cervix: Closed, long, high, medium  Uterus: S=D, POS heart tones    Rectal exam: Small flesh colored external hemorrhoid Present on anus  WET PREP: no trichomonas, monilia, or clue cells and pH 4.1 Neg whiff  CULTURES: GC and Chlamydia genprobes.      See OB flowsheet    Assessment/Plan     Encounter Diagnoses   Name Primary?     Supervision of normal first pregnancy in second trimester Yes     Screen for STD (sexually transmitted disease)      External hemorrhoids      Orders Placed This Encounter   Procedures     Wet Prep POCT   GC/Chlam cultures sent.     .    Given the external hemorrhoid on exam and the timing of the bleeding with bowel movements, the bleeding is likely caused by the hemorrhoid. Given no signs of contractions and cervix was closed on exam, suspicion for PTL Is low. However, signs/sx of PTL reviewed with and instructed her to call with any warning signs.    For treatment of the hemorrhoid and constipation, recommended hydrocortisone cream applied externally to hemorrhoid, colace or Miralax, high fiber diet, increase fluids for softer stool, and tub soaks.     - Reviewed why/how to contact provider.    Patient education/orders or handouts today:  PTL signs/symptoms and fetal movement   Return to clinic in 2-4 weeks and prn if questions or concerns.   CECELIA Chaves CNM

## 2019-06-25 NOTE — TELEPHONE ENCOUNTER
Spoke with Isiah who reports she had bright red blood with small clots with bowel movements on both Sunday (6/23) and Monday (6/24).    Discussed that it is common to have bright red blood with bowel movements if they are hard and difficult as its caused by anal fissures. She acknowledged this was the case but states that she checked and the blood was coming from vagina.    Discussed this should be evaluated in clinic and she agreed with plan. Scheduled her for this afternoon.

## 2019-06-26 LAB
C TRACH DNA SPEC QL NAA+PROBE: NEGATIVE
CLUE CELLS: NEGATIVE
N GONORRHOEA DNA SPEC QL NAA+PROBE: NEGATIVE
SPECIMEN SOURCE: NORMAL
SPECIMEN SOURCE: NORMAL
TRICHOMONAS (WET PREP): NEGATIVE
YEAST (WET PREP): NEGATIVE

## 2019-07-03 ENCOUNTER — TELEPHONE (OUTPATIENT)
Dept: OBGYN | Facility: CLINIC | Age: 39
End: 2019-07-03

## 2019-07-03 NOTE — TELEPHONE ENCOUNTER
Patient is calling looking for strength of compression maternity stocking. Voicemail left for patient, medium stockings ordered.

## 2019-07-05 DIAGNOSIS — O09.529 ANTEPARTUM MULTIGRAVIDA OF ADVANCED MATERNAL AGE: Primary | ICD-10-CM

## 2019-07-09 ENCOUNTER — TELEPHONE (OUTPATIENT)
Dept: OBGYN | Facility: CLINIC | Age: 39
End: 2019-07-09

## 2019-07-09 DIAGNOSIS — O99.810 ABNORMAL GLUCOSE IN PREGNANCY, ANTEPARTUM: Primary | ICD-10-CM

## 2019-07-09 DIAGNOSIS — O09.529 ANTEPARTUM MULTIGRAVIDA OF ADVANCED MATERNAL AGE: ICD-10-CM

## 2019-07-09 LAB
DEPRECATED CALCIDIOL+CALCIFEROL SERPL-MC: 30 UG/L (ref 20–75)
ERYTHROCYTE [DISTWIDTH] IN BLOOD BY AUTOMATED COUNT: 13.2 % (ref 10–15)
GLUCOSE 1H P 50 G GLC PO SERPL-MCNC: 144 MG/DL (ref 60–129)
HCT VFR BLD AUTO: 35.4 % (ref 35–47)
HCV AB SERPL QL IA: NONREACTIVE
HGB BLD-MCNC: 11.9 G/DL (ref 11.7–15.7)
MCH RBC QN AUTO: 30.2 PG (ref 26.5–33)
MCHC RBC AUTO-ENTMCNC: 33.6 G/DL (ref 31.5–36.5)
MCV RBC AUTO: 90 FL (ref 78–100)
PLATELET # BLD AUTO: 206 10E9/L (ref 150–450)
RBC # BLD AUTO: 3.94 10E12/L (ref 3.8–5.2)
T PALLIDUM AB SER QL: NONREACTIVE
WBC # BLD AUTO: 9.2 10E9/L (ref 4–11)

## 2019-07-09 PROCEDURE — 85027 COMPLETE CBC AUTOMATED: CPT | Performed by: ADVANCED PRACTICE MIDWIFE

## 2019-07-09 PROCEDURE — 86780 TREPONEMA PALLIDUM: CPT | Performed by: ADVANCED PRACTICE MIDWIFE

## 2019-07-09 PROCEDURE — 82306 VITAMIN D 25 HYDROXY: CPT | Performed by: ADVANCED PRACTICE MIDWIFE

## 2019-07-09 PROCEDURE — 82950 GLUCOSE TEST: CPT | Performed by: ADVANCED PRACTICE MIDWIFE

## 2019-07-09 PROCEDURE — 86803 HEPATITIS C AB TEST: CPT | Performed by: ADVANCED PRACTICE MIDWIFE

## 2019-07-09 PROCEDURE — 36415 COLL VENOUS BLD VENIPUNCTURE: CPT | Performed by: ADVANCED PRACTICE MIDWIFE

## 2019-07-09 NOTE — TELEPHONE ENCOUNTER
Pt took 1 hour glucose and failed.  She Left work after completing test because she was feeling so dizzy/nauseaous.  Ate some protein cereal, peanut butter sandwich for lunch.  Feels better.   Pt agreeable to 3 hour test, just felt crummy - wondering fi there's any other way to est.    Reviewed 2 weeks of 4x/day testing - only says if you have poorly controlled diabetes, not diagnostic of diabetes.     Wlil look into applue juice for 3 hour testing as possibility and update pt at prenatal group tomorrow.    Jazmín RAI, CATARINAM

## 2019-07-10 ENCOUNTER — OFFICE VISIT (OUTPATIENT)
Dept: OBGYN | Facility: CLINIC | Age: 39
End: 2019-07-10
Attending: ADVANCED PRACTICE MIDWIFE
Payer: COMMERCIAL

## 2019-07-10 VITALS
HEART RATE: 60 BPM | HEIGHT: 64 IN | SYSTOLIC BLOOD PRESSURE: 117 MMHG | WEIGHT: 184.9 LBS | DIASTOLIC BLOOD PRESSURE: 78 MMHG | BODY MASS INDEX: 31.57 KG/M2

## 2019-07-10 DIAGNOSIS — B95.1 POSITIVE GBS TEST: ICD-10-CM

## 2019-07-10 DIAGNOSIS — O09.819 PREGNANCY RESULTING FROM IN VITRO FERTILIZATION, ANTEPARTUM: ICD-10-CM

## 2019-07-10 DIAGNOSIS — O99.810 ABNORMAL GLUCOSE IN PREGNANCY, ANTEPARTUM: ICD-10-CM

## 2019-07-10 DIAGNOSIS — K64.4 EXTERNAL HEMORRHOIDS: ICD-10-CM

## 2019-07-10 DIAGNOSIS — O21.9 NAUSEA/VOMITING IN PREGNANCY: Primary | ICD-10-CM

## 2019-07-10 DIAGNOSIS — O09.529 ANTEPARTUM MULTIGRAVIDA OF ADVANCED MATERNAL AGE: ICD-10-CM

## 2019-07-10 PROCEDURE — G0463 HOSPITAL OUTPT CLINIC VISIT: HCPCS | Mod: ZF

## 2019-07-10 RX ORDER — ONDANSETRON 4 MG/1
8 TABLET, ORALLY DISINTEGRATING ORAL EVERY 8 HOURS PRN
Qty: 10 TABLET | Refills: 3 | Status: SHIPPED | OUTPATIENT
Start: 2019-07-10 | End: 2019-09-24

## 2019-07-10 ASSESSMENT — ANXIETY QUESTIONNAIRES
5. BEING SO RESTLESS THAT IT IS HARD TO SIT STILL: SEVERAL DAYS
1. FEELING NERVOUS, ANXIOUS, OR ON EDGE: SEVERAL DAYS
6. BECOMING EASILY ANNOYED OR IRRITABLE: SEVERAL DAYS
3. WORRYING TOO MUCH ABOUT DIFFERENT THINGS: SEVERAL DAYS
7. FEELING AFRAID AS IF SOMETHING AWFUL MIGHT HAPPEN: NOT AT ALL
2. NOT BEING ABLE TO STOP OR CONTROL WORRYING: SEVERAL DAYS
GAD7 TOTAL SCORE: 6

## 2019-07-10 ASSESSMENT — PAIN SCALES - GENERAL: PAINLEVEL: NO PAIN (0)

## 2019-07-10 ASSESSMENT — PATIENT HEALTH QUESTIONNAIRE - PHQ9
5. POOR APPETITE OR OVEREATING: SEVERAL DAYS
SUM OF ALL RESPONSES TO PHQ QUESTIONS 1-9: 5

## 2019-07-10 ASSESSMENT — MIFFLIN-ST. JEOR: SCORE: 1498.95

## 2019-07-10 NOTE — PROGRESS NOTES
"Subjective:      39 year old  at 26w2d presents for Mindfulness Group Prenatal Care Session #2 with her partner Mikhail.     Denies vaginal bleeding or leakage of fluid.  No contractions. Good fetal movement.   No HA, visual changes, RUQ or epigastric pain.     Patient concerns: Feeling well overall.  - Discussed need for GTT. Order placed.    Objective:  Vitals:    07/10/19 2028   BP: 117/78   Pulse: 60   Weight: 83.9 kg (184 lb 14.4 oz)   Height: 1.626 m (5' 4.02\")    See ob flowsheet  Assessment/Plan     Encounter Diagnoses   Name Primary?     Nausea/vomiting in pregnancy Yes     Antepartum multigravida of advanced maternal age - [facility] CNM      Positive GBS test      Pregnancy resulting from in vitro fertilization, antepartum      External hemorrhoids      Abnormal glucose in pregnancy, antepartum        Orders Placed This Encounter   Medications     docusate sodium (COLACE) 50 MG capsule     Si capsule daily     ondansetron (ZOFRAN ODT) 4 MG ODT tab     Sig: Take 2 tablets (8 mg) by mouth every 8 hours as needed for nausea     Dispense:  10 tablet     Refill:  3     ABO   Date Value Ref Range Status   2019 A  Final     RH(D)   Date Value Ref Range Status   2019 Pos  Final     Antibody Screen   Date Value Ref Range Status   2019 Neg  Final      Rhogam  was not indicated.  Tdap was given.   Flu shot was not given.     -  Pt and Mikhail participated in group prenatal discussion at which time all pt's and Mikhail's questions were discussed and answered.  Pt and Mikhail participated in relaxation techniques, partner massage techniques and guided mediation for relaxation.     - Reviewed total weight gain, encouraged continued healthy diet and exercise.      - Reviewed importance of daily fetal kick count and why/how to contact provider.    - Reviewed why/how to contact provider if headache/visual changes/RUQ or epigastric pain, decreased fetal movement, vaginal bleeding, leakage of fluid or more " than 4 contractions in an hour.     Patient education/orders or handouts today:PTL signs/symptoms, birth control, breastfeeding, and  support.     Reviewed EOB labs - failed 1 hour. Spent ~10 minutes after group one on one with midwife (and her partner) allowing space to voice concerns and frustrations around possibility of diabetes.  Plans Zofran before 3 hour to help with possible nausea.     Return to clinic in 4 weeks for next Mindfulness Prenatal Group and prn if questions or concerns.     I, JESSE Villalobos am serving as a scribe; to document services personally performed by  Jazmín Sharpe CNM based on data collection and the provider's statements to me.     JESSE Villalobos        The encounter was performed by me and scribed by the SNM. The scribed note accurately reflects my personal services and decisions made by me.  Jazmín RAI CNM

## 2019-07-11 ASSESSMENT — ANXIETY QUESTIONNAIRES: GAD7 TOTAL SCORE: 6

## 2019-07-17 DIAGNOSIS — O99.810 ABNORMAL GLUCOSE IN PREGNANCY, ANTEPARTUM: ICD-10-CM

## 2019-07-17 LAB
GLUCOSE 1H P 100 G GLC PO SERPL-MCNC: 141 MG/DL (ref 60–179)
GLUCOSE 2H P 100 G GLC PO SERPL-MCNC: 121 MG/DL (ref 60–154)
GLUCOSE 3H P 100 G GLC PO SERPL-MCNC: 61 MG/DL (ref 60–139)
GLUCOSE BLDC GLUCOMTR-MCNC: 88 MG/DL (ref 70–99)
GLUCOSE P FAST SERPL-MCNC: 86 MG/DL (ref 60–94)

## 2019-07-17 PROCEDURE — 82951 GLUCOSE TOLERANCE TEST (GTT): CPT | Performed by: ADVANCED PRACTICE MIDWIFE

## 2019-07-17 PROCEDURE — 36415 COLL VENOUS BLD VENIPUNCTURE: CPT | Performed by: ADVANCED PRACTICE MIDWIFE

## 2019-07-17 PROCEDURE — 82952 GTT-ADDED SAMPLES: CPT | Performed by: ADVANCED PRACTICE MIDWIFE

## 2019-07-17 PROCEDURE — 82962 GLUCOSE BLOOD TEST: CPT

## 2019-07-30 ENCOUNTER — TELEPHONE (OUTPATIENT)
Dept: OBGYN | Facility: CLINIC | Age: 39
End: 2019-07-30

## 2019-07-30 NOTE — TELEPHONE ENCOUNTER
Received phone call from Isiah who reports that she has been noting a very small amount of pink spotting, mostly noted with wiping since Sunday. Patient denies cramping, contractions, abdominal pain. Good fetal movement. Denies intercourse or anything in vagina recently.     Patient is out of town on business until Thursday. She also has had bleeding hemorrhoid in the past, and she is not sure if this is related.     Discussed with Isiah watchful monitoring of spotting is appropriate at this time. Discussed bleeding precautions and to call to triage/seek evaluation at L&D unit if spotting becomes bright red bleeding or if she notes contractions, cramping, or decrease in fetal movement. Discussed scheduling of office visit for evaluation of spotting later this week when she returns to town and patient agreeable.    Discussed plan of care with ANA Lai, who agrees.

## 2019-08-01 ENCOUNTER — OFFICE VISIT (OUTPATIENT)
Dept: OBGYN | Facility: CLINIC | Age: 39
End: 2019-08-01
Attending: ADVANCED PRACTICE MIDWIFE
Payer: COMMERCIAL

## 2019-08-01 ENCOUNTER — HOSPITAL ENCOUNTER (OUTPATIENT)
Facility: CLINIC | Age: 39
Discharge: HOME | End: 2019-08-01
Attending: ADVANCED PRACTICE MIDWIFE | Admitting: ADVANCED PRACTICE MIDWIFE
Payer: COMMERCIAL

## 2019-08-01 VITALS
HEART RATE: 80 BPM | SYSTOLIC BLOOD PRESSURE: 108 MMHG | BODY MASS INDEX: 31.77 KG/M2 | DIASTOLIC BLOOD PRESSURE: 71 MMHG | HEIGHT: 64 IN | WEIGHT: 186.1 LBS

## 2019-08-01 VITALS — TEMPERATURE: 98.4 F | DIASTOLIC BLOOD PRESSURE: 72 MMHG | SYSTOLIC BLOOD PRESSURE: 115 MMHG | RESPIRATION RATE: 16 BRPM

## 2019-08-01 DIAGNOSIS — M54.9 BACK PAIN IN PREGNANCY: ICD-10-CM

## 2019-08-01 DIAGNOSIS — O09.819 PREGNANCY RESULTING FROM IN VITRO FERTILIZATION, ANTEPARTUM: ICD-10-CM

## 2019-08-01 DIAGNOSIS — K64.4 EXTERNAL HEMORRHOIDS: Primary | ICD-10-CM

## 2019-08-01 DIAGNOSIS — B95.1 POSITIVE GBS TEST: ICD-10-CM

## 2019-08-01 DIAGNOSIS — K64.4 EXTERNAL HEMORRHOIDS: ICD-10-CM

## 2019-08-01 DIAGNOSIS — O09.529 ANTEPARTUM MULTIGRAVIDA OF ADVANCED MATERNAL AGE: Primary | ICD-10-CM

## 2019-08-01 DIAGNOSIS — O99.891 BACK PAIN IN PREGNANCY: ICD-10-CM

## 2019-08-01 DIAGNOSIS — O99.810 ABNORMAL GLUCOSE IN PREGNANCY, ANTEPARTUM: ICD-10-CM

## 2019-08-01 LAB
ALBUMIN UR-MCNC: NEGATIVE MG/DL
APPEARANCE UR: CLEAR
BILIRUB UR QL STRIP: NEGATIVE
COLOR UR AUTO: YELLOW
FIBRONECTIN FETAL VAG QL: NEGATIVE
GLUCOSE UR STRIP-MCNC: NEGATIVE MG/DL
HGB UR QL STRIP: ABNORMAL
KETONES UR STRIP-MCNC: NEGATIVE MG/DL
LEUKOCYTE ESTERASE UR QL STRIP: NEGATIVE
MUCOUS THREADS #/AREA URNS LPF: PRESENT /LPF
NITRATE UR QL: NEGATIVE
PH UR STRIP: 6.5 PH (ref 5–7)
RBC #/AREA URNS AUTO: 2 /HPF (ref 0–2)
SOURCE: ABNORMAL
SP GR UR STRIP: 1.01 (ref 1–1.03)
SPECIMEN SOURCE: NORMAL
SQUAMOUS #/AREA URNS AUTO: 1 /HPF (ref 0–1)
UROBILINOGEN UR STRIP-MCNC: NORMAL MG/DL (ref 0–2)
WBC #/AREA URNS AUTO: 1 /HPF (ref 0–5)
WET PREP SPEC: NORMAL

## 2019-08-01 PROCEDURE — 82731 ASSAY OF FETAL FIBRONECTIN: CPT | Performed by: ADVANCED PRACTICE MIDWIFE

## 2019-08-01 PROCEDURE — 59025 FETAL NON-STRESS TEST: CPT

## 2019-08-01 PROCEDURE — 87491 CHLMYD TRACH DNA AMP PROBE: CPT | Performed by: ADVANCED PRACTICE MIDWIFE

## 2019-08-01 PROCEDURE — 87591 N.GONORRHOEAE DNA AMP PROB: CPT | Performed by: ADVANCED PRACTICE MIDWIFE

## 2019-08-01 PROCEDURE — 87210 SMEAR WET MOUNT SALINE/INK: CPT | Performed by: ADVANCED PRACTICE MIDWIFE

## 2019-08-01 PROCEDURE — 87086 URINE CULTURE/COLONY COUNT: CPT | Performed by: ADVANCED PRACTICE MIDWIFE

## 2019-08-01 PROCEDURE — 81001 URINALYSIS AUTO W/SCOPE: CPT | Performed by: ADVANCED PRACTICE MIDWIFE

## 2019-08-01 PROCEDURE — G0463 HOSPITAL OUTPT CLINIC VISIT: HCPCS | Mod: 25

## 2019-08-01 RX ORDER — POLYETHYLENE GLYCOL 3350 17 G/17G
1 POWDER, FOR SOLUTION ORAL DAILY PRN
Qty: 1 BOTTLE | Refills: 3 | Status: SHIPPED | OUTPATIENT
Start: 2019-08-01 | End: 2019-09-24

## 2019-08-01 ASSESSMENT — MIFFLIN-ST. JEOR: SCORE: 1504.14

## 2019-08-01 NOTE — LETTER
"2019       RE: Isiah Reina   [facility] [address]     Dear Colleague,    Thank you for referring your patient, Isiah Reina, to the [facility] at [facility]. Please see a copy of my visit note below.    Subjective:      39 year old  at 29w3d presentst for a problem prenatal appointment - bleeding   + vaginal bleeding.  No leakage of fluid.  no contractions but back pain . + fetal movement.     No HA, visual changes, RUQ or epigastric pain.     Patient concerns:   - Back pain all the time.  Is working out but not doing any targeted stretches for low back.  Hasn't started wearing maternity belt, open to RX.  Sleep is going poorly - trying all the positions to help but feels like it's so many pillows.  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    - External hemorrhoid is not getting worse but not getting better.  Just started using a Sitz bath. Takes Colace daily.  Increasing her water.  Squatty potty.   No bleeding with BM. Tried senna before but got GI upset.  Would like an assessment.    Objective:  Vitals:    19 0804   BP: 108/71   Pulse: 80   Weight: 84.4 kg (186 lb 1.6 oz)   Height: 1.626 m (5' 4\")   See OB flowsheet    External hemorrhoid 2cm.  Not thrombosed.     Assessment/Plan     Encounter Diagnoses   Name Primary?     Antepartum multigravida of advanced maternal age - [facility] CNM Yes     Pregnancy resulting from in vitro fertilization, antepartum      Positive GBS test      Abnormal glucose in pregnancy, antepartum - Failed 1 hour. Passed 3 hour all 4 values      External hemorrhoids      Back pain in pregnancy        Orders Placed This Encounter   Medications     order for DME     Sig: Maternity Belt order     Dispense:  1 each     Refill:  0     polyethylene glycol (MIRALAX/GLYCOLAX) powder     Sig: Take 17 g (1 capful) by mouth daily as needed for constipation "     Dispense:  1 Bottle     Refill:  3     ABO   Date Value Ref Range Status   03/18/2019 A  Final     RH(D)   Date Value Ref Range Status   03/18/2019 Pos  Final     Antibody Screen   Date Value Ref Range Status   03/18/2019 Neg  Final     TDAP WAS NOT GIVEN - will do at group prenatal next week    - Reviewed total weight gain, encouraged continued healthy diet and exercise.  .  Reviewed importance of daily fetal kick count and why/how to contact provider.    - Reviewed why/how to contact provider if headache/visual changes/RUQ or epigastric pain, decreased fetal movement, vaginal bleeding, leakage of fluid or more than 4 contractions in an hour.     Patient education/orders or handouts today:PTL signs/symptoms      Return to clinic next week for group prenatal session and prn if questions or concerns.   Reviewed could do vaginal exam and work up for vaginal bleeding but would recommend having done in triage so FFN could be collected and EFM/TOCO on to see if cramping. Pt is agreeable.  Charge RN and on call CNM updated.     Jazmín Sharpe, APRN CNM

## 2019-08-01 NOTE — PLAN OF CARE
Pt was evaluated for vaginal bleeding.   Cervix closed.  EFM and toco appropriate for gestational age.   Given discharge instructions. All questions answered. Discharged at 1205.

## 2019-08-01 NOTE — PROGRESS NOTES
"HOSPITAL TRIAGE NOTE  ===================    CHIEF COMPLAINT  ========================  Isiah GABRIELLE Reina is a 39 year old patient presenting today at 29w3d for evaluation of spotting.    No LMP recorded. Patient is pregnant. IVF pregnancy.  Estimated Date of Delivery: Oct 14, 2019     HPI  ==================   Pt presented to clinic today reporting spotting- sent from clinic to  for evaluation.   Reports that she started spotting on . States she has noticed a \"little pink\" on the toilet paper when she wipes. Occasionally there is a small amount of pink-reddish spotting on a panty liner- never more than quarter sized. Amount has been consistent since - no increase in amount or frequency. Has had no other bleeding or spotting in pregnancy except the 1st trimester after she would go running.   Denies any cramping or contractions. Has had consistent generalized lower back pain/soreness during the pregnacy- no pattern, not rhythmic, no recent change. No apparent change in activity, no recent intercourse or vaginal exams. Denies vaginal irritation, itching, change in discharge or odor. No leaking of fluid.  Denies urinary s/s.   Does report a external hemorrhoid that has been bothersome but is improving. Previously noted scant blood with BM but has not occurred for the last 2 days.      Placenta anterior, high on level 2 ultrasound.     Reports +fetal movement.     Prenatal record and labs reviewed [facility], through [facility] E[facility].    CONTRACTIONS: none  ABDOMINAL PAIN: none  FETAL MOVEMENT: active    VAGINAL BLEEDING: small spotting  RUPTURE OF MEMBRANES: no  PELVIC PAIN: none    PREGNANCY COMPLICATIONS: GBS positive, IVF pregnancy    # Pain Assessment:  Current Pain Score 2019   Patient currently in pain? denies   Isiah nathan pain level was assessed and she currently denies pain.        REVIEW OF SYSTEMS  =====================  C: NEGATIVE for fever, chills  I: NEGATIVE for " worrisome rashes, moles or lesions  E: NEGATIVE for vision changes or irritation  R: NEGATIVE for significant cough or SOB  CV: NEGATIVE for chest pain, palpitations or varicosities  GI: NEGATIVE for nausea, abdominal pain, heartburn, or change in bowel habits  : NEGATIVE for frequency, dysuria, or hematuria  M: NEGATIVE for significant arthralgias or myalgia  N: NEGATIVE for headache, weakness, dizziness or paresthesias  P: NEGATIVE for changes in mood or affect    PROBLEM LIST  ===============  Patient Active Problem List    Diagnosis Date Noted     Labor and delivery indication for care or intervention 08/01/2019     Priority: Medium     Abnormal glucose in pregnancy, antepartum - Failed 1 hour. Passed 3 hour all 4 values 07/09/2019     Priority: Medium     External hemorrhoids 06/25/2019     Priority: Medium     Antepartum multigravida of advanced maternal age - [facility] CNM 04/04/2019     Priority: Medium     Level 2 ultrasound on 5/13/19  Fetal echocardiogram d/t IVF pregnancy scheduled for 5/29/19 5/2/19: AFP ordered       Pregnancy resulting from in vitro fertilization, antepartum 04/04/2019     Priority: Medium     Level 2 ultrasound on 5/13/19  Fetal echocardiogram d/t IVF pregnancy scheduled for 5/29/19       Positive GBS test 03/21/2019     Priority: Medium     3/21/19 - rx for Keflex sent for GBS bacteruria  4-6-19 - test of care UC shows persistent bacteruria, need abx treatment [ ]       Seasonal allergic rhinitis 12/14/2015     Priority: Medium       HISTORIES  ==============  ALLERGIES:      Allergies   Allergen Reactions     Ibuprofen Anaphylaxis     PAST MEDICAL HISTORY  Past Medical History:   Diagnosis Date     Allergic rhinitis      Depression     During grad school only, was situational. She received counseling weekly for a period of time. No suicidal ideation. No medicaiton needed.     Hx of previous reproductive problem 2017    3 medicated IUIs, 2 rounds of IVF, 2 FETs, 1 chemical pregn      Rectal bleeding     had colonoscopy all normal, no recurrence     Right wrist fracture 2014     SOCIAL HISTORY  Social History     Socioeconomic History     Marital status:      Spouse name: Brady Dreyer     Number of children: 0     Years of education: Graduate     Highest education level: Not on file   Occupational History     Occupation: marketing     Employer: [email or web address]   Social Needs     Financial resource strain: Not on file     Food insecurity:     Worry: Not on file     Inability: Not on file     Transportation needs:     Medical: Not on file     Non-medical: Not on file   Tobacco Use     Smoking status: Never Smoker     Smokeless tobacco: Never Used   Substance and Sexual Activity     Alcohol use: Yes     Alcohol/week: 0.6 oz     Frequency: Monthly or less     Drinks per session: 1 or 2     Binge frequency: Never     Comment: stopped in ifv   and pregnancy     Drug use: No     Sexual activity: Yes     Partners: Male     Birth control/protection: None     Comment:    Lifestyle     Physical activity:     Days per week: Not on file     Minutes per session: Not on file     Stress: Not on file   Relationships     Social connections:     Talks on phone: Not on file     Gets together: Not on file     Attends Tenriism service: Not on file     Active member of club or organization: Not on file     Attends meetings of clubs or organizations: Not on file     Relationship status: Not on file     Intimate partner violence:     Fear of current or ex partner: Not on file     Emotionally abused: Not on file     Physically abused: Not on file     Forced sexual activity: Not on file   Other Topics Concern     Parent/sibling w/ CABG, MI or angioplasty before 65F 55M? Not Asked      Service No     Blood Transfusions No     Caffeine Concern No     Comment: 1cpd     Occupational Exposure No     Hobby Hazards No     Sleep Concern No     Comment: Worse recently becasue of buying a house.     Stress  Concern No     Weight Concern No     Comment: Gained weight in the past year, maybe stress related.     Special Diet No     Back Care No     Exercise Yes     Comment: 5-6 times running, weights once weekly     Bike Helmet Not Asked     Comment: NA     Seat Belt Yes     Self-Exams No   Social History Narrative    Moved from [place] two years ago.  .  No pets.  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  Pt was trained as a , but was offered a job in marketing. Undergrad in [place].    [employer] grad school in Chemistry. Baking is her hobby. Has friends at work and gym but is still establishing relationships.     Feels safe in environments.  Bought a house in [place].  travels a lot, is an .     YUKI Nix RN, MS,        How much exercise per week? 4-5 times week     How much calcium per day? prenatal       How much caffeine per day? 0    How much vitamin D per day? supplements    Do you/your family wear seatbelts?  Yes    Do you/your family use safety helmets? Yes    Do you/your family use sunscreen? Yes    Do you/your family keep firearms in the home? No    Do you/your family have a smoke detector(s)? Yes    Reviewed [facility]-           FAMILY HISTORY  Family History   Adopted: Yes     OB HISTORY  OB History    Para Term  AB Living   1 0 0 0 0 0   SAB TAB Ectopic Multiple Live Births   0 0 0 0 0      # Outcome Date GA Lbr Tyrell/2nd Weight Sex Delivery Anes PTL Lv   1 Current              Prenatal Labs:   Lab Results   Component Value Date    ABO A 2019    RH Pos 2019    AS Neg 2019    HEPBANG Nonreactive 2019    RUQIGG 26 2019    HGB 11.9 2019     Rubella- immune    ULTRASOUND(s) reviewed: in epic    EXAM  ============  /72   Temp 98.4  F (36.9  C) (Oral)   Resp 16   GENERAL APPEARANCE: healthy, alert and no distress  RESP: lungs clear to auscultation - no rales, rhonchi or wheezes  BREAST: normal without masses, tenderness or nipple  discharge and no palpable axillary masses or adenopathy  CV: regular rates and rhythm, normal S1 S2, no S3 or S4 and no murmur,and no varicosities  ABDOMEN:  soft, nontender, no epigastric pain  SKIN: no suspicious lesions or rashes  NEURO: Denies headache, blurred vision, other vision changes  PSYCH: mentation appears normal. and affect normal/bright  MS/ LEGS: Reflexes normal bilaterally    CONTRACTIONS: none   FETAL HEART TONES: continuous EFM- baseline 135 with moderate variability and positive accelerations. No decelerations.  NST: REACTIVE    Speculum exam: External os of cervix visually appears closed, moderate amount of thick white discharge noted.   No blood in the vaginal vault, no active bleeding, one tiny spot of light pink on qtip used to collect wet prep. Moderate ectropion visualized, not currently friable.   FFN, GC/CT, Wet Prep collected.   SVE: Closed/long/high, post/firm (external os fingertip, internal os closed)    PRESENTATION: Cephalic by BSUS    ROM: no  ROMPLUS: not done    LABS: FFN, GC/CT, wet prep, UA, UC  Lab results reviewed- FFN negative, Wet prep negative, UA negative     GC/CT and UC pending   GBS previously positive, not recollected    DIAGNOSIS  ============  29w3d seen on the [facility] for [facility]ting    No bleeding or spotting noted.  Cervix closed/long/high, post/firm    FFN negative  Wet prep negative  UA negative    GC/CT pending  UC pending    NST: REACTIVE  Fetal Heart rate tracing:category one    Patient Active Problem List   Diagnosis     Seasonal allergic rhinitis     Positive GBS test     Antepartum multigravida of advanced maternal age - [facility] CNM     Pregnancy resulting from in vitro fertilization, antepartum     External hemorrhoids     Abnormal glucose in pregnancy, antepartum - Failed 1 hour. Passed 3 hour all 4 values     Labor and delivery indication for care or intervention       PLAN  ============  Reviewed normal assessment and labs.  UC and GC/CT  pending.  Reviewed bleeding precautions,  labor precautions and s/s to notify provider or present to BP.  Pt verbalized understanding and agrees with plan of care.    Discharged to home in stable condition.    CECELIA Quijano CNM     Fetal Non-Stress Test Results    NST Ordered By: CECELIA Quijano CNM   NST Medical Indication: spotting    NST Start & Stop Times  NST Start Time: 1000  NST Stop Time: 1020    NST Results  Fetus A   Baseline Rate: 135  Accelerations: Present  Decelerations: None  Interpretation: reactive

## 2019-08-01 NOTE — DISCHARGE INSTRUCTIONS
Discharge Instruction for Undelivered Patients      You were seen for: Bleeding Assessment  We Consulted: ANA Rod    Diet:   Drink 8 to 12 glasses of liquids (milk, juice, water) every day.  You may eat meals and snacks.     Activity:  Call your doctor or nurse midwife if your baby is moving less than usual.     Call your provider if you notice:  Swelling in your face or increased swelling in your hands or legs.  Headaches that are not relieved by Tylenol (acetaminophen).  Changes in your vision (blurring: seeing spots or stars.)  Nausea (sick to your stomach) and vomiting (throwing up).   Weight gain of 5 pounds or more per week.  Heartburn that doesn't go away.  Signs of bladder infection: pain when you urinate (use the toilet), need to go more often and more urgently.  The bag of ray (rupture of membranes) breaks, or you notice leaking in your underwear.  Bright red blood in your underwear.  Abdominal (lower belly) or stomach pain.  For first baby: Contractions (tightening) less than 5 minutes apart for one hour or more.  *If less than 34 weeks: Contractions (tightenings) more than 6 times in one hour.  Increase or change in vaginal discharge (note the color and amount)    Follow-up:  As scheduled in the clinic

## 2019-08-01 NOTE — PLAN OF CARE
Patient is a  at 29.3 weeks here from clinic for evaluation of vaginal spotting since .  . Denies cramping or LOF. Says she has pink discharge with wiping and scant amount red spotting on pad. Patient denies having intercourse. Says [place]. Also says she fell yesterday on a wet hotel floor and landed on her hands and knees. LEA Bingham CNM notified of patient arrival.

## 2019-08-01 NOTE — PROGRESS NOTES
"Subjective:      39 year old  at 29w3d presentst for a problem prenatal appointment - bleeding   + vaginal bleeding.  No leakage of fluid.  no contractions but back pain . + fetal movement.     No HA, visual changes, RUQ or epigastric pain.     Patient concerns:   - Back pain all the time.  Is working out but not doing any targeted stretches for low back.  Hasn't started wearing maternity belt, open to RX.  Sleep is going poorly - trying all the positions to help but feels like it's so many pillows.  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    - External hemorrhoid is not getting worse but not getting better.  Just started using a Sitz bath. Takes Colace daily.  Increasing her water.  Squatty potty.   No bleeding with BM. Tried senna before but got GI upset.  Would like an assessment.    Objective:  Vitals:    19 0804   BP: 108/71   Pulse: 80   Weight: 84.4 kg (186 lb 1.6 oz)   Height: 1.626 m (5' 4\")   See OB flowsheet    External hemorrhoid 2cm.  Not thrombosed.     Assessment/Plan     Encounter Diagnoses   Name Primary?     Antepartum multigravida of advanced maternal age - [facility] CNM Yes     Pregnancy resulting from in vitro fertilization, antepartum      Positive GBS test      Abnormal glucose in pregnancy, antepartum - Failed 1 hour. Passed 3 hour all 4 values      External hemorrhoids      Back pain in pregnancy        Orders Placed This Encounter   Medications     order for DME     Sig: Maternity Belt order     Dispense:  1 each     Refill:  0     polyethylene glycol (MIRALAX/GLYCOLAX) powder     Sig: Take 17 g (1 capful) by mouth daily as needed for constipation     Dispense:  1 Bottle     Refill:  3     ABO   Date Value Ref Range Status   2019 A  Final     RH(D)   Date Value Ref Range Status   2019 Pos  Final     Antibody Screen   Date Value Ref Range Status   2019 Neg  Final     TDAP WAS NOT GIVEN - will do at group prenatal " next week    - Reviewed total weight gain, encouraged continued healthy diet and exercise.  .  Reviewed importance of daily fetal kick count and why/how to contact provider.    - Reviewed why/how to contact provider if headache/visual changes/RUQ or epigastric pain, decreased fetal movement, vaginal bleeding, leakage of fluid or more than 4 contractions in an hour.     Patient education/orders or handouts today:PTL signs/symptoms      Return to clinic next week for group prenatal session and prn if questions or concerns.   Reviewed could do vaginal exam and work up for vaginal bleeding but would recommend having done in triage so FFN could be collected and EFM/TOCO on to see if cramping. Pt is agreeable.  Charge RN and on call CNM updated.     Jazmín Sharpe, APRN CNM

## 2019-08-02 LAB
BACTERIA SPEC CULT: NORMAL
C TRACH DNA SPEC QL NAA+PROBE: NEGATIVE
Lab: NORMAL
N GONORRHOEA DNA SPEC QL NAA+PROBE: NEGATIVE
SPECIMEN SOURCE: NORMAL

## 2019-08-07 ENCOUNTER — OFFICE VISIT (OUTPATIENT)
Dept: OBGYN | Facility: CLINIC | Age: 39
End: 2019-08-07
Attending: ADVANCED PRACTICE MIDWIFE
Payer: COMMERCIAL

## 2019-08-07 DIAGNOSIS — O09.529 ANTEPARTUM MULTIGRAVIDA OF ADVANCED MATERNAL AGE: Primary | ICD-10-CM

## 2019-08-07 DIAGNOSIS — K64.4 EXTERNAL HEMORRHOIDS: ICD-10-CM

## 2019-08-07 DIAGNOSIS — O09.819 PREGNANCY RESULTING FROM IN VITRO FERTILIZATION, ANTEPARTUM: ICD-10-CM

## 2019-08-07 DIAGNOSIS — O99.810 ABNORMAL GLUCOSE IN PREGNANCY, ANTEPARTUM: ICD-10-CM

## 2019-08-07 DIAGNOSIS — Z23 NEED FOR VACCINATION: ICD-10-CM

## 2019-08-07 DIAGNOSIS — B95.1 POSITIVE GBS TEST: ICD-10-CM

## 2019-08-07 PROCEDURE — 90471 IMMUNIZATION ADMIN: CPT | Mod: ZF

## 2019-08-07 PROCEDURE — 25000128 H RX IP 250 OP 636: Mod: ZF

## 2019-08-07 PROCEDURE — 90715 TDAP VACCINE 7 YRS/> IM: CPT | Mod: ZF

## 2019-08-07 PROCEDURE — G0463 HOSPITAL OUTPT CLINIC VISIT: HCPCS | Mod: ZF,25

## 2019-08-07 NOTE — LETTER
"2019       RE: Isiah Reina   [facility] [id number]     Dear Colleague,    Thank you for referring your patient, Isiah Reina, to the [facility] at [facility]. Please see a copy of my visit note below.    Subjective:      39 year old  at 30w2d presents for [facility] Group Prenatal Care Session #3 with partner Mikhail.     no vaginal bleeding or leakage of fluid.  no contractions. + fetal movement.   No HA, visual changes, RUQ or epigastric pain.     Patient concerns: Feeling well overall.      Objective:  Vitals:    19 0756   BP: 123/77   Pulse: 76   Weight: 83.9 kg (184 lb 14.4 oz)   Height: 1.626 m (5' 4.02\")    See ob flowsheet  Assessment/Plan     Encounter Diagnoses   Name Primary?     Antepartum multigravida of advanced maternal age - [facility] CNM Yes     Pregnancy resulting from in vitro fertilization, antepartum      Abnormal glucose in pregnancy, antepartum - Failed 1 hour. Passed 3 hour all 4 values      External hemorrhoids      Positive GBS test      Need for vaccination      Orders Placed This Encounter   Procedures     TDAP VACCINE (BOOSTRIX)       ABO   Date Value Ref Range Status   2019 A  Final     RH(D)   Date Value Ref Range Status   2019 Pos  Final     Antibody Screen   Date Value Ref Range Status   2019 Neg  Final     Rhogam  was not given.  Tdap was given.     -  Pt and partner participated in group prenatal discussion at which time all pt's and partner's questions were discussed and answered.  Pt and partner participated in relaxation techniques, spinning babies techniques and guided mediation for relaxation.     - Reviewed total weight gain, encouraged continued healthy diet and exercise.  .  Reviewed importance of daily fetal kick count and why/how to contact provider.    - Reviewed why/how to contact provider if headache/visual changes/RUQ or epigastric pain, decreased fetal " movement, vaginal bleeding, leakage of fluid or more than 4 contractions in an hour.     Patient education/orders or handouts today:PTL signs/symptoms, breastfeeding, optimal positioning for labor.     Return to clinic in 2 weeks for MALIA if desired.   Return to clinic in 4 weeks for next [facility] Prenatal Group and prn if questions or concerns.     CECELIA BertrandM

## 2019-08-07 NOTE — PROGRESS NOTES
"Subjective:      39 year old  at 30w2d presents for [facility] Group Prenatal Care Session #3 with partner Mikhail.     no vaginal bleeding or leakage of fluid.  no contractions. + fetal movement.   No HA, visual changes, RUQ or epigastric pain.     Patient concerns:    Feeling well overall.      Objective:  Vitals:    19 0756   BP: 123/77   Pulse: 76   Weight: 83.9 kg (184 lb 14.4 oz)   Height: 1.626 m (5' 4.02\")    See ob flowsheet  Assessment/Plan     Encounter Diagnoses   Name Primary?     Antepartum multigravida of advanced maternal age - [facility] CNM Yes     Pregnancy resulting from in vitro fertilization, antepartum      Abnormal glucose in pregnancy, antepartum - Failed 1 hour. Passed 3 hour all 4 values      External hemorrhoids      Positive GBS test      Need for vaccination      Orders Placed This Encounter   Procedures     TDAP VACCINE (BOOSTRIX)       ABO   Date Value Ref Range Status   2019 A  Final     RH(D)   Date Value Ref Range Status   2019 Pos  Final     Antibody Screen   Date Value Ref Range Status   2019 Neg  Final      Rhogam  was not given.  Tdap was given.       -  Pt and partner participated in group prenatal discussion at which time all pt's and partner's questions were discussed and answered.  Pt and partner participated in relaxation techniques, spinning babies techniques and guided mediation for relaxation.     - Reviewed total weight gain, encouraged continued healthy diet and exercise.  .  Reviewed importance of daily fetal kick count and why/how to contact provider.    - Reviewed why/how to contact provider if headache/visual changes/RUQ or epigastric pain, decreased fetal movement, vaginal bleeding, leakage of fluid or more than 4 contractions in an hour.     Patient education/orders or handouts today:PTL signs/symptoms, breastfeeding, optimal positioning for labor.       Return to clinic in 2 weeks for MALIA if desired.   Return to clinic in 4 weeks for next " [facility] Prenatal Group and prn if questions or concerns.     Jazmín Sharpe, APRN CNM

## 2019-08-08 VITALS
WEIGHT: 184.9 LBS | DIASTOLIC BLOOD PRESSURE: 77 MMHG | HEIGHT: 64 IN | BODY MASS INDEX: 31.57 KG/M2 | SYSTOLIC BLOOD PRESSURE: 123 MMHG | HEART RATE: 76 BPM

## 2019-08-08 ASSESSMENT — MIFFLIN-ST. JEOR: SCORE: 1498.95

## 2019-08-08 ASSESSMENT — PAIN SCALES - GENERAL: PAINLEVEL: NO PAIN (0)

## 2019-08-14 ENCOUNTER — TELEPHONE (OUTPATIENT)
Dept: OBGYN | Facility: CLINIC | Age: 39
End: 2019-08-14

## 2019-08-14 NOTE — TELEPHONE ENCOUNTER
Spoke with Kw[place] who is about 31 weeks pregnant. She now has a rash that is red and itchy. She notices in most on her buttocks and the backs of her legs. Denies any fevers. Denies any new skin products or clothing. +FM. Spoke with Ashley Perdomo in clinic who advises hydrocortisone cream and oral benadryl for itching. She doesn't think it has to do with pregnancy. Patient agreeable and will go to urgent care if rash worsens instead of improvement.

## 2019-08-15 ENCOUNTER — OFFICE VISIT (OUTPATIENT)
Dept: FAMILY MEDICINE | Facility: CLINIC | Age: 39
End: 2019-08-15
Payer: COMMERCIAL

## 2019-08-15 VITALS
BODY MASS INDEX: 32.17 KG/M2 | TEMPERATURE: 97.6 F | DIASTOLIC BLOOD PRESSURE: 66 MMHG | HEART RATE: 73 BPM | OXYGEN SATURATION: 98 % | WEIGHT: 187.5 LBS | SYSTOLIC BLOOD PRESSURE: 103 MMHG

## 2019-08-15 DIAGNOSIS — O26.86 POLYMORPHIC ERUPTION OF PREGNANCY: Primary | ICD-10-CM

## 2019-08-15 RX ORDER — TRIAMCINOLONE ACETONIDE 5 MG/G
OINTMENT TOPICAL
Qty: 15 G | Refills: 1 | Status: SHIPPED | OUTPATIENT
Start: 2019-08-15 | End: 2019-09-24

## 2019-08-15 NOTE — PROGRESS NOTES
SUBJECTIVE:   Isiah Reina is a 39 year old female who presents to clinic today to discuss the following problem(s).    Worsening rash  - started on  on the back of her ankles, then wrapped all around her ankles, also developing on her bottom  - no history of eczema or atopic dermatitis  - does get seasonal allergies but she's been OK recently and hasn't been taking any medications  - currently pregnant:  at 31+4 via invitro fertization  - seen at [facility]   - was seen last week (group prenatal visits)   - next scheduled appoint in early September  - just started having increased swelling in her hands and feet  - been trying OTC hydrocortisone BID but no oral anti-histamines    ROS:   CONSTITUTIONAL: NEGATIVE for chills, fatigue, fever,sweats and weight loss  ENT/MOUTH: NEGATIVE for nasal congestion, postnasal drainage and rhinorrhea  RESP: NEGATIVE for cough, hemoptysis and wheezing  CV: NEGATIVE for chest pain/chest pressure, dyspnea on exertion  GI: NEGATIVE for abdominal pain, diarrhea, dysphagia and nausea  : NEGATIVE for Dysuria, Frequency and Hematuria  MUSCULOSKELETAL: Positive for new onset swelling in her hands and feet as noted above. NEGATIVE for arthralgias, cyanosis, or edema  INTEGUMENTARY/SKIN: Positive for rash and associated pruritis as detailed above.  HEME/ALLERGY/IMMUNE: NEGATIVE for bleeding disorder and swollen nodes  PSYCHIATRIC: NEGATIVE    Today's PHQ-2:  PHQ-2 (  [employer]) 8/15/2019 7/10/2019   Q1: Little interest or pleasure in doing things 0 0   Q2: Feeling down, depressed or hopeless 0 1   PHQ-2 Score 0 1       Past Medical History:   Diagnosis Date     Allergic rhinitis      Depression     During grad school only, was situational. She received counseling weekly for a period of time. No suicidal ideation. No medicaiton needed.     Hx of previous reproductive problem     3 medicated IUIs, 2 rounds of IVF, 2 FETs, 1 chemical pregn     Rectal bleeding      had colonoscopy all normal, no recurrence     Right wrist fracture 2014     Past Surgical History:   Procedure Laterality Date     COLONOSCOPY  ~10 years ago     ivf   egg retreival      times 2     Family History   Adopted: Yes     Social History     Tobacco Use     Smoking status: Never Smoker     Smokeless tobacco: Never Used   Substance Use Topics     Alcohol use: Yes     Alcohol/week: 0.6 oz     Frequency: Monthly or less     Drinks per session: 1 or 2     Binge frequency: Never     Comment: stopped in ifv   and pregnancy     Drug use: No     Social History     Social History Narrative    Moved from [place] two years ago.  .  No pets.  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  Pt was trained as a , but was offered a job in marketing. [employer] in [place].    [employer] grad school in Chemistry. Baking is her hobby. Has friends at work and gym but is still establishing relationships.     Feels safe in environments.  Bought a house in [place].  travels a lot, is an .     YUKI Nix RN, MS,        How much exercise per week? 4-5 times week     How much calcium per day? prenatal       How much caffeine per day? 0    How much vitamin D per day? supplements    Do you/your family wear seatbelts?  Yes    Do you/your family use safety helmets? Yes    Do you/your family use sunscreen? Yes    Do you/your family keep firearms in the home? No    Do you/your family have a smoke detector(s)? Yes    Reviewed [facility] 3-           Current Outpatient Medications   Medication     calcium citrate-vitamin D (CITRACAL) 315-200 MG-UNIT TABS per tablet     Docosahexaenoic Acid (DHA NATURAL OMEGA-3 PO)     docusate sodium (COLACE) 50 MG capsule     hydrocortisone (ANUSOL-HC) 2.5 % cream     order for DME     polyethylene glycol (MIRALAX/GLYCOLAX) powder     PRENATAL 27-1 MG TABS     triamcinolone (KENALOG) 0.5 % external ointment     ondansetron (ZOFRAN ODT) 4 MG ODT tab     No current  facility-administered medications for this visit.      I have reviewed the patient's past medical, surgical, family, and social history.     OBJECTIVE:   /66 (BP Location: Right arm, Patient Position: Sitting, Cuff Size: Adult Large)   Pulse 73   Temp 97.6  F (36.4  C) (Oral)   Wt 85 kg (187 lb 8 oz)   SpO2 98%   BMI 32.17 kg/m      Constitutional: well-appearing, appears stated age  Eyes: conjunctivae without erythema, sclera anicteric.   Cardiac: regular rate and rhythm, normal S1/S2, no murmur/rubs/gallops  Respiratory: lungs clear to auscultation bilaterally, normal work of breathing, no wheezes/crackles  Skin: Maculopapular rash noted around ankles and at underwear line of buttocks. Minimal signs of excoriation. No evidence of papular drainage.  Psych: affect is full and appropriate, speech is fluent and non-pressured    ASSESSMENT AND PLAN:     Isiah was seen today for derm problem.    Diagnoses and all orders for this visit:    Polymorphic eruption of pregnancy  -     triamcinolone (KENALOG) 0.5 % external ointment; Apply to affected areas twice daily as needed.    Patient reports she has previously tried low dose OTC hydrocortisone on her rash with no benefit. Suspecting PEP vs Atopic Eruption of Pegnancy. Certainly there is concern for cholestasis of pregnancy and I considered checking her bile acid, but pruritis does not appear significant and is localized to areas discussed with no symptoms on patient's hands or feet. Will try stronger steroid as noted above and advised patient she can try some claritin or zyrtec for the itching if benadryl makes her too sleepy. Also advised patient if this ointment fails to address her symptoms she should plan to follow up with her OBGYN next week.        Murphy Ramesh MD  [facility]  08/15/2019, 3:02 PM

## 2019-08-15 NOTE — NURSING NOTE
39 year old  Chief Complaint   Patient presents with     Derm Problem     itchy rash spreading on her legs x 4-5 days       Blood pressure 103/66, pulse 73, temperature 97.6  F (36.4  C), temperature source Oral, weight 85 kg (187 lb 8 oz), SpO2 98 %, not currently breastfeeding. Body mass index is 32.17 kg/m .  Patient Active Problem List   Diagnosis     Seasonal allergic rhinitis     Positive GBS test     Antepartum multigravida of advanced maternal age - WHS CNM     Pregnancy resulting from in vitro fertilization, antepartum     External hemorrhoids     Abnormal glucose in pregnancy, antepartum - Failed 1 hour. Passed 3 hour all 4 values       Wt Readings from Last 2 Encounters:   08/15/19 85 kg (187 lb 8 oz)   08/08/19 83.9 kg (184 lb 14.4 oz)     BP Readings from Last 3 Encounters:   08/15/19 103/66   08/08/19 123/77   08/01/19 115/72         Current Outpatient Medications   Medication     calcium citrate-vitamin D (CITRACAL) 315-200 MG-UNIT TABS per tablet     Docosahexaenoic Acid (DHA NATURAL OMEGA-3 PO)     docusate sodium (COLACE) 50 MG capsule     hydrocortisone (ANUSOL-HC) 2.5 % cream     order for DME     polyethylene glycol (MIRALAX/GLYCOLAX) powder     PRENATAL 27-1 MG TABS     ondansetron (ZOFRAN ODT) 4 MG ODT tab     No current facility-administered medications for this visit.        Social History     Tobacco Use     Smoking status: Never Smoker     Smokeless tobacco: Never Used   Substance Use Topics     Alcohol use: Yes     Alcohol/week: 0.6 oz     Frequency: Monthly or less     Drinks per session: 1 or 2     Binge frequency: Never     Comment: stopped in ifv   and pregnancy     Drug use: No       Health Maintenance Due   Topic Date Due     PREVENTIVE CARE VISIT  02/23/2019     REPEAT ANTIBODY SCREEN (OB)  07/22/2019     RH IMMUNE GLOBULIN (OB)  07/22/2019     PAP  11/11/2019       Lab Results   Component Value Date    PAP NIL 11/11/2016         August 15, 2019 2:59 PM

## 2019-08-23 ENCOUNTER — TELEPHONE (OUTPATIENT)
Dept: OBGYN | Facility: CLINIC | Age: 39
End: 2019-08-23

## 2019-08-24 NOTE — TELEPHONE ENCOUNTER
Isiah called because she went to bed around 2030 and then at 2215 woke up to go to restroom. She noted dark red blood on her panty liner, approximately the size of 2 quarters. No recent SIC, no contractions, abdominal pain or leaking of fluid. Lots of normal fetal movement today. Denies vaginal symptoms, urinary symptoms or any changes in physical activity today. No HA, vision change, RUQ pain, n/v. She did fly home yesterday, but otherwise no changes in routine. Placenta anterior and no previa on level II. Rh positive.     Given the absence of any additional symptoms we discussed that it would be reasonable to expectantly manage spotting at this time. I advised [facility] if she notices any additional bleeding or experiences new symptoms including those discussed above. Isiah agreed with this plan. All questions answered.     CECELIA Berman CNM

## 2019-08-29 ENCOUNTER — TELEPHONE (OUTPATIENT)
Dept: OBGYN | Facility: CLINIC | Age: 39
End: 2019-08-29

## 2019-08-29 NOTE — TELEPHONE ENCOUNTER
Isiah G1 at 33 3/7 wks GA with MARTHA 10/14/19 calling with loose stool for about a week. Denies fever or chills. Sounds congested and also has sore throat. Recommended plenty of rest and increase fluids. Acetaminophen prn.Stressed importance of preventing dehydration.If starts vomiting, instructed to call or if s/s worsen.Pt indicated understanding and agreed with plan.  Has prenatal appt next week.Pt indicated understanding and agreed with plan.

## 2019-09-04 ENCOUNTER — OFFICE VISIT (OUTPATIENT)
Dept: OBGYN | Facility: CLINIC | Age: 39
End: 2019-09-04
Attending: ADVANCED PRACTICE MIDWIFE
Payer: COMMERCIAL

## 2019-09-04 DIAGNOSIS — O09.529 ANTEPARTUM MULTIGRAVIDA OF ADVANCED MATERNAL AGE: Primary | ICD-10-CM

## 2019-09-04 DIAGNOSIS — K64.4 EXTERNAL HEMORRHOIDS: ICD-10-CM

## 2019-09-04 DIAGNOSIS — O09.819 PREGNANCY RESULTING FROM IN VITRO FERTILIZATION, ANTEPARTUM: ICD-10-CM

## 2019-09-04 DIAGNOSIS — Z23 NEED FOR IMMUNIZATION AGAINST INFLUENZA: ICD-10-CM

## 2019-09-04 DIAGNOSIS — O99.810 ABNORMAL GLUCOSE IN PREGNANCY, ANTEPARTUM: ICD-10-CM

## 2019-09-04 DIAGNOSIS — B95.1 POSITIVE GBS TEST: ICD-10-CM

## 2019-09-04 PROCEDURE — 25000128 H RX IP 250 OP 636: Mod: ZF

## 2019-09-04 PROCEDURE — G0008 ADMIN INFLUENZA VIRUS VAC: HCPCS | Mod: ZF

## 2019-09-04 PROCEDURE — G0463 HOSPITAL OUTPT CLINIC VISIT: HCPCS | Mod: ZF,25

## 2019-09-04 PROCEDURE — 90686 IIV4 VACC NO PRSV 0.5 ML IM: CPT | Mod: ZF

## 2019-09-04 NOTE — LETTER
"2019       RE: Isiah Reina   [facility] [address]     Dear Colleague,    Thank you for referring your patient, Isiah Reina, to the [facility] at [facility]. Please see a copy of my visit note below.    Subjective:      39 year old  at 34w2d presents for Mindfulness Group Prenatal Care Session #4 with her partner Mikhail    No vaginal bleeding or leakage of fluid.  no contractions.+ fetal movement.   No HA, visual changes, RUQ or epigastric pain.     Patient concerns: Feeling well overall.    Objective:  Vitals:    19 0820   BP: 117/77   Pulse: 89   Weight: 86 kg (189 lb 9.6 oz)   Height: 1.626 m (5' 4.02\")   See ob flowsheet    Assessment/Plan  Encounter Diagnoses   Name Primary?     Antepartum multigravida of advanced maternal age - WHS CNM Yes     Pregnancy resulting from in vitro fertilization, antepartum      Abnormal glucose in pregnancy, antepartum - Failed 1 hour. Passed 3 hour all 4 values      External hemorrhoids      Positive GBS test      Need for immunization against influenza      Orders Placed This Encounter   Procedures     HC FLU VAC PRESRV FREE QUAD SPLIT VIR 3+YRS IM     ABO   Date Value Ref Range Status   2019 A  Final     RH(D)   Date Value Ref Range Status   2019 Pos  Final     Antibody Screen   Date Value Ref Range Status   2019 Neg  Final     -  Pt and partner participated in group prenatal discussion at which time all pt's and partner's questions were discussed and answered.  Pt and partner participated in relaxation techniques, labor dance techniques, breastfeeding education, early and active labor education, and guided mediation.     - Reviewed total weight gain, encouraged continued healthy diet and exercise.  .  Reviewed importance of daily fetal kick count and why/how to contact provider.    GBS screening: Not indicated - present in urine. Plan prophylaxis in labor.  Labor " signs discussed. Reinforced daily fetal movement counts.  Reviewed why/how to contact provider if headache/visual changes/RUQ or epigastric pain, decreased fetal movement, vaginal bleeding, leakage of fluid, or >4-6Ctx/hr.   Return to clinic in 2 weeks for MALIA and prn if questions or concerns.     Return to clinic for Prenatal Group Reunion TBLEA Sharpe, CECELIA PEREZ

## 2019-09-05 VITALS
SYSTOLIC BLOOD PRESSURE: 117 MMHG | BODY MASS INDEX: 32.37 KG/M2 | HEART RATE: 89 BPM | DIASTOLIC BLOOD PRESSURE: 77 MMHG | HEIGHT: 64 IN | WEIGHT: 189.6 LBS

## 2019-09-05 ASSESSMENT — MIFFLIN-ST. JEOR: SCORE: 1520.27

## 2019-09-05 ASSESSMENT — PAIN SCALES - GENERAL: PAINLEVEL: NO PAIN (0)

## 2019-09-05 NOTE — NURSING NOTE
FLU VACCINE QUESTIONNAIRE:  Ask the following questions of all parties who want influenza vaccination:     CONTRAINDICATIONS  1.  Is the patient age less than 6 months?  NO  2.  Has the person to be vaccinated ever had Guillain-[place] syndrome? NO  3.  Has the person to be vaccinated had the vaccine this year? NO  4.  Is the person to be vaccinated sick today? NO  5.  Does the person to be vaccinated have an allergy to eggs or a component of the vaccine? NO  6.  Has the person to vaccinated ever had a serious reaction to an influenza vaccination in the past? NO                             INFLUENZA VACCINATION NOTE      Information sheet given to patient and questions answered.

## 2019-09-05 NOTE — PROGRESS NOTES
"Subjective:      39 year old  at 34w2d presents for Mindfulness Group Prenatal Care Session #4 with her partner Mikhail     no vaginal bleeding or leakage of fluid.  no contractions.+ fetal movement.   No HA, visual changes, RUQ or epigastric pain.     Patient concerns:    Feeling well overall.      Objective:  Vitals:    19 0820   BP: 117/77   Pulse: 89   Weight: 86 kg (189 lb 9.6 oz)   Height: 1.626 m (5' 4.02\")    See ob flowsheet  Assessment/Plan     Encounter Diagnoses   Name Primary?     Antepartum multigravida of advanced maternal age - WHS CNM Yes     Pregnancy resulting from in vitro fertilization, antepartum      Abnormal glucose in pregnancy, antepartum - Failed 1 hour. Passed 3 hour all 4 values      External hemorrhoids      Positive GBS test      Need for immunization against influenza      Orders Placed This Encounter   Procedures     HC FLU VAC PRESRV FREE QUAD SPLIT VIR 3+YRS IM       ABO   Date Value Ref Range Status   2019 A  Final     RH(D)   Date Value Ref Range Status   2019 Pos  Final     Antibody Screen   Date Value Ref Range Status   2019 Neg  Final         -  Pt and partner participated in group prenatal discussion at which time all pt's and partner's questions were discussed and answered.  Pt and partner participated in relaxation techniques, labor dance techniques, breastfeeding education, early and active labor education, and guided mediation.     - Reviewed total weight gain, encouraged continued healthy diet and exercise.  .  Reviewed importance of daily fetal kick count and why/how to contact provider.      GBS screening: Not indicated - present in urine. Plan prophylaxis in labor.  Labor signs discussed. Reinforced daily fetal movement counts.  Reviewed why/how to contact provider if headache/visual changes/RUQ or epigastric pain, decreased fetal movement, vaginal bleeding, leakage of fluid, or >4-6Ctx/hr.   Return to clinic in 2 weeks for MALIA and prn if " questions or concerns.       Return to clinic for Prenatal Group Reunion TBD    Jazmín Sharpe, CECELIA ELMOREM

## 2019-09-09 ENCOUNTER — OFFICE VISIT (OUTPATIENT)
Dept: PEDIATRICS | Facility: CLINIC | Age: 39
End: 2019-09-09
Payer: COMMERCIAL

## 2019-09-09 DIAGNOSIS — Z34.90 PRE-BIRTH VISIT FOR EXPECTANT MOTHER: Primary | ICD-10-CM

## 2019-09-09 PROCEDURE — 99207 ZZC NO BILLABLE SERVICE THIS VISIT: CPT | Performed by: PEDIATRICS

## 2019-09-09 NOTE — PROGRESS NOTES
Patient here for meet and greet.  Ms. Reina comes in for a prenatal visit with baby's father.  Baby is due in about 1 month.  It is a girl.  The pregnancy is going well.  We discussed the hospital rounding system, the clinic, and I answered their questions.    A prenatal packet was given, and I invited them to call me if they have any other questions.

## 2019-09-17 ENCOUNTER — APPOINTMENT (OUTPATIENT)
Dept: LAB | Facility: CLINIC | Age: 39
End: 2019-09-17
Attending: ADVANCED PRACTICE MIDWIFE
Payer: COMMERCIAL

## 2019-09-17 ENCOUNTER — OFFICE VISIT (OUTPATIENT)
Dept: OBGYN | Facility: CLINIC | Age: 39
End: 2019-09-17
Attending: ADVANCED PRACTICE MIDWIFE
Payer: COMMERCIAL

## 2019-09-17 VITALS
HEART RATE: 65 BPM | DIASTOLIC BLOOD PRESSURE: 77 MMHG | WEIGHT: 188.8 LBS | SYSTOLIC BLOOD PRESSURE: 116 MMHG | BODY MASS INDEX: 32.39 KG/M2

## 2019-09-17 DIAGNOSIS — O09.529 ANTEPARTUM MULTIGRAVIDA OF ADVANCED MATERNAL AGE: Primary | ICD-10-CM

## 2019-09-17 LAB
ERYTHROCYTE [DISTWIDTH] IN BLOOD BY AUTOMATED COUNT: 13.4 % (ref 10–15)
HCT VFR BLD AUTO: 38 % (ref 35–47)
HGB BLD-MCNC: 13.2 G/DL (ref 11.7–15.7)
MCH RBC QN AUTO: 31.1 PG (ref 26.5–33)
MCHC RBC AUTO-ENTMCNC: 34.7 G/DL (ref 31.5–36.5)
MCV RBC AUTO: 90 FL (ref 78–100)
PLATELET # BLD AUTO: 175 10E9/L (ref 150–450)
RBC # BLD AUTO: 4.24 10E12/L (ref 3.8–5.2)
WBC # BLD AUTO: 8.9 10E9/L (ref 4–11)

## 2019-09-17 PROCEDURE — G0463 HOSPITAL OUTPT CLINIC VISIT: HCPCS | Mod: ZF

## 2019-09-17 PROCEDURE — 85027 COMPLETE CBC AUTOMATED: CPT | Performed by: ADVANCED PRACTICE MIDWIFE

## 2019-09-17 PROCEDURE — 36415 COLL VENOUS BLD VENIPUNCTURE: CPT | Performed by: ADVANCED PRACTICE MIDWIFE

## 2019-09-17 ASSESSMENT — ANXIETY QUESTIONNAIRES
1. FEELING NERVOUS, ANXIOUS, OR ON EDGE: NOT AT ALL
6. BECOMING EASILY ANNOYED OR IRRITABLE: SEVERAL DAYS
5. BEING SO RESTLESS THAT IT IS HARD TO SIT STILL: NOT AT ALL
3. WORRYING TOO MUCH ABOUT DIFFERENT THINGS: NOT AT ALL
7. FEELING AFRAID AS IF SOMETHING AWFUL MIGHT HAPPEN: NOT AT ALL
2. NOT BEING ABLE TO STOP OR CONTROL WORRYING: NOT AT ALL
GAD7 TOTAL SCORE: 1

## 2019-09-17 ASSESSMENT — PATIENT HEALTH QUESTIONNAIRE - PHQ9: 5. POOR APPETITE OR OVEREATING: NOT AT ALL

## 2019-09-17 NOTE — PROGRESS NOTES
Subjective:     39 year old  at 36w1d presents for a routine prenatal appointment.  Denies vaginal bleeding,  leakage of fluid, or change in vaginal discharge.  Denies contractions.  + fetal movement.       No HA, visual changes, RUQ or epigastric pain.   Patient concerns:   - Reports feeling more nauseous in past couple days; denies sick contacts, diarrhea, or fevers. Denies vomiting, still able to eat some food.    - Partner expresses concerns about sleep.  Pt reports getting ~ 6 hours of sleep at night, waking up 3-4 times d/t need to urinate and trouble falling back asleep. Feels tired throughout the day.    Objective:  Vitals:    19 1138   BP: 116/77   BP Location: Left arm   Patient Position: Chair   Pulse: 65   Weight: 85.6 kg (188 lb 12.8 oz)    See OB flowsheet    Assessment/Plan:  Encounter Diagnosis   Name Primary?     Antepartum multigravida of advanced maternal age - WHS CNM Yes     Orders Placed This Encounter   Procedures     CBC with Platelets     GBS screening: Not indicated - present in urine. Plan prophylaxis in labor.   CBC today.  Labor signs discussed. Reinforced daily fetal movement counts.  Reviewed why/how to contact provider if headache/visual changes/RUQ or epigastric pain, decreased fetal movement, vaginal bleeding, leakage of fluid.  Discussed use of Vitamin B6 and Unisom prn for nausea.  If symptoms change or worsen, to return to clinic.  Reviewed comfort measures for sleeping, urinating as needed, naps, etc.  Return to clinic in 1 week and prn if questions or concerns.     CECELIA Hartman CNM

## 2019-09-17 NOTE — LETTER
2019       RE: Isiah Reina   [facility] [address]     Dear Colleague,    Thank you for referring your patient, Isiah Reina, to the [facility] at [facility]. Please see a copy of my visit note below.    Subjective:     39 year old  at 36w1d presents for a routine prenatal appointment.  Denies vaginal bleeding,  leakage of fluid, or change in vaginal discharge.  Denies contractions.  + fetal movement.       No HA, visual changes, RUQ or epigastric pain.   Patient concerns:   - Reports feeling more nauseous in past couple days; denies sick contacts, diarrhea, or fevers. Denies vomiting, still able to eat some food.    - Partner expresses concerns about sleep.  Pt reports getting ~ 6 hours of sleep at night, waking up 3-4 times d/t need to urinate and trouble falling back asleep. Feels tired throughout the day.    Objective:  Vitals:    19 1138   BP: 116/77   BP Location: Left arm   Patient Position: Chair   Pulse: 65   Weight: 85.6 kg (188 lb 12.8 oz)   See OB flowsheet    Assessment/Plan:  Encounter Diagnosis   Name Primary?     Antepartum multigravida of advanced maternal age - WHS CNM Yes     Orders Placed This Encounter   Procedures     CBC with Platelets     GBS screening: Not indicated - present in urine. Plan prophylaxis in labor.   CBC today.  Labor signs discussed. Reinforced daily fetal movement counts.  Reviewed why/how to contact provider if headache/visual changes/RUQ or epigastric pain, decreased fetal movement, vaginal bleeding, leakage of fluid.  Discussed use of Vitamin B6 and Unisom prn for nausea.  If symptoms change or worsen, to return to clinic.  Reviewed comfort measures for sleeping, urinating as needed, naps, etc.  Return to clinic in 1 week and prn if questions or concerns.     CECELIA Hartman CNM

## 2019-09-18 ASSESSMENT — ANXIETY QUESTIONNAIRES: GAD7 TOTAL SCORE: 1

## 2019-09-20 ENCOUNTER — TELEPHONE (OUTPATIENT)
Dept: OBGYN | Facility: CLINIC | Age: 39
End: 2019-09-20

## 2019-09-20 NOTE — TELEPHONE ENCOUNTER
Pt called to discuss medications safe to take in pregnancy for GERD and constipation. Pt read that zantac has new warnings and does not feel comfortable using, nurse discussed pepcid as an acceptable, safe alternative.     Advised colace, miralax safe in pregnancy. Nurse further advised to increase water intake, can use glycerine suppository if needed. Should call back if recommendations symptoms not improved after trialing these options. Pt expressed understanding and agrees with plan

## 2019-09-24 ENCOUNTER — OFFICE VISIT (OUTPATIENT)
Dept: OBGYN | Facility: CLINIC | Age: 39
End: 2019-09-24
Attending: ADVANCED PRACTICE MIDWIFE
Payer: COMMERCIAL

## 2019-09-24 VITALS
HEART RATE: 69 BPM | DIASTOLIC BLOOD PRESSURE: 81 MMHG | BODY MASS INDEX: 32.77 KG/M2 | WEIGHT: 191 LBS | SYSTOLIC BLOOD PRESSURE: 125 MMHG

## 2019-09-24 DIAGNOSIS — K64.4 EXTERNAL HEMORRHOIDS: ICD-10-CM

## 2019-09-24 DIAGNOSIS — Z34.03 ENCOUNTER FOR SUPERVISION OF NORMAL FIRST PREGNANCY IN THIRD TRIMESTER: Primary | ICD-10-CM

## 2019-09-24 PROBLEM — Z34.02 SUPERVISION OF NORMAL FIRST PREGNANCY IN SECOND TRIMESTER: Status: ACTIVE | Noted: 2019-09-24

## 2019-09-24 PROCEDURE — G0463 HOSPITAL OUTPT CLINIC VISIT: HCPCS | Mod: ZF

## 2019-09-24 ASSESSMENT — PAIN SCALES - GENERAL: PAINLEVEL: NO PAIN (0)

## 2019-09-24 NOTE — PROGRESS NOTES
37 week prenatal visit  S:  Patient has been feeling more swelling and joint stiffness. She is also experiencing numbness in her hands and arms. Her heartburn and nausea is also worse now. We discussed these are normal as the pregnancy gets closer to term. We also discussed normal fetal movement patterns and plans for analgesia during labor. Patient states she is comfortable with whatever is needed. She denies headache, vision changes, chest pain, shortness of breath and RUQ pain.     O:  /81   Pulse 69   Wt 86.6 kg (191 lb)   Breastfeeding? No   BMI 32.77 kg/m     Fetal heart rate: 140  Fundal height 37 cm    A/P:  #PNC  -Continue weekly visits    #GBS positive  -Will receive antibiotics during labor    #Hand numbness  -Carpal tunnel syndrome  -Suggested wrist brace to wear at night  Jarod Edwards MS3    The above patient was seen and evaluated with the medical student who acted as my scribe for the above note. Agree with note, changes made as appropriate.    Guera Metcalf MD

## 2019-09-24 NOTE — LETTER
9/24/2019       RE: Isiah Reina  [address] [facility] [address]     Dear Colleague,    Thank you for referring your patient, Isiah Reina, to the [facility] at [facility]. Please see a copy of my visit note below.    37 week prenatal visit  S:  Patient has been feeling more swelling and joint stiffness. She is also experiencing numbness in her hands and arms. Her heartburn and nausea is also worse now. We discussed these are normal as the pregnancy gets closer to term. We also discussed normal fetal movement patterns and plans for analgesia during labor. Patient states she is comfortable with whatever is needed. She denies headache, vision changes, chest pain, shortness of breath and RUQ pain.     O:  /81   Pulse 69   Wt 86.6 kg (191 lb)   Breastfeeding? No   BMI 32.77 kg/m      Fetal heart rate: 140  Fundal height 37 cm    A/P:  #PNC  -Continue weekly visits    #GBS positive  -Will receive antibiotics during labor    #Hand numbness  -Carpal tunnel syndrome  -Suggested wrist brace to wear at night  Jarod Edwards,MS3    The above patient was seen and evaluated with the medical student who acted as my scribe for the above note. Agree with note, changes made as appropriate.    Guera Metcalf MD

## 2019-09-24 NOTE — NURSING NOTE
Chief Complaint   Patient presents with     Prenatal Care     MALIA 37 weeks and 1 day   Татьяна Schwarz LPN

## 2019-09-29 ENCOUNTER — HOSPITAL ENCOUNTER (OUTPATIENT)
Facility: CLINIC | Age: 39
Discharge: HOME | End: 2019-09-29
Attending: ADVANCED PRACTICE MIDWIFE | Admitting: ADVANCED PRACTICE MIDWIFE
Payer: COMMERCIAL

## 2019-09-29 ENCOUNTER — TELEPHONE (OUTPATIENT)
Dept: OBGYN | Facility: CLINIC | Age: 39
End: 2019-09-29

## 2019-09-29 VITALS
WEIGHT: 191 LBS | HEIGHT: 64 IN | DIASTOLIC BLOOD PRESSURE: 73 MMHG | TEMPERATURE: 97.5 F | HEART RATE: 69 BPM | BODY MASS INDEX: 32.61 KG/M2 | RESPIRATION RATE: 16 BRPM | SYSTOLIC BLOOD PRESSURE: 111 MMHG

## 2019-09-29 PROBLEM — Z36.89 ENCOUNTER FOR TRIAGE IN PREGNANT PATIENT: Status: ACTIVE | Noted: 2019-09-29

## 2019-09-29 LAB — RUPTURE OF FETAL MEMBRANES BY ROM PLUS: NEGATIVE

## 2019-09-29 PROCEDURE — 59025 FETAL NON-STRESS TEST: CPT

## 2019-09-29 PROCEDURE — G0463 HOSPITAL OUTPT CLINIC VISIT: HCPCS

## 2019-09-29 PROCEDURE — 84112 EVAL AMNIOTIC FLUID PROTEIN: CPT | Performed by: ADVANCED PRACTICE MIDWIFE

## 2019-09-29 RX ORDER — POLYETHYLENE GLYCOL 3350 17 G/17G
1 POWDER, FOR SOLUTION ORAL DAILY
COMMUNITY
End: 2021-05-04

## 2019-09-29 ASSESSMENT — MIFFLIN-ST. JEOR: SCORE: 1526.37

## 2019-09-29 NOTE — TELEPHONE ENCOUNTER
"Returned page from patient.    Reports she was out running errands and felt like she \"peed herself\"- gush of fluid. Notes that her panty liner and a little bit of her underwear was wet. Changed her pad and has been laying down for the last 10minutes. Has not noticed more fluid.  Not feeling contractions. +FM.    Reviewed presenting to BP for evaluation (rom+), especially in setting of GBS +.    Pt agreeable. Will present to unit.     CECELIA Quijano, ANA   "

## 2019-09-29 NOTE — PROGRESS NOTES
"HOSPITAL TRIAGE NOTE  ===================    CHIEF COMPLAINT  ========================  Kwakutaylor Reina is a 39 year old patient presenting today at 37w6d for evaluation of leakage of fluid x 1.    No LMP recorded. Patient is pregnant.  Estimated Date of Delivery: Oct 14, 2019     HPI  ==================   Pt called on call CNM reporting one episode of feeling like she \"peed herself\" at 0910 while she was running errands. Had some wetness on her panty liner. She was instructed to present to triage.  Upon arrival, pt reports she has not felt any more discharge or leaking since this time. Did not have any discharge or fluid or entirety of triage stay.   She reports she has some cramping and contractions but that they are not any different over the last couple of weeks. Reports as nonpainful, some tightening. Denies vaginal bleeding. Reports +fetal movement- very active.    Denies HA, vision changes,ruq/epigastric pain.    Prenatal record and labs reviewed from [facility], through [email or web address] EMR.    CONTRACTIONS: every 3-7 minutes, very mild to pt, mild to notpalpable  ABDOMINAL PAIN: dull and cramping  FETAL MOVEMENT: active    VAGINAL BLEEDING: none  RUPTURE OF MEMBRANES: pending ROM plus  PELVIC PAIN: none    PREGNANCY COMPLICATIONS: GBS positive  OTHER: IVF pregnancy    # Pain Assessment:  Current Pain Score 2019   Patient currently in pain? magno nathan pain level was assessed and she currently denies pain.        REVIEW OF SYSTEMS  =====================  C: NEGATIVE for fever, chills  I: NEGATIVE for worrisome rashes, moles or lesions  E: NEGATIVE for vision changes or irritation  R: NEGATIVE for significant cough or SOB  CV: NEGATIVE for chest pain, palpitations or varicosities  GI: NEGATIVE for nausea, abdominal pain, heartburn, or change in bowel habits  : NEGATIVE for frequency, dysuria, or hematuria  M: NEGATIVE for significant arthralgias or myalgia  N: NEGATIVE for " headache, weakness, dizziness or paresthesias  P: NEGATIVE for changes in mood or affect    PROBLEM LIST  ===============  Patient Active Problem List    Diagnosis Date Noted     Encounter for triage in pregnant patient 09/29/2019     Priority: Medium     Supervision of normal first pregnancy in second trimester 09/24/2019     Priority: Medium     Abnormal glucose in pregnancy, antepartum - Failed 1 hour. Passed 3 hour all 4 values 07/09/2019     Priority: Medium     External hemorrhoids 06/25/2019     Priority: Medium     Antepartum multigravida of advanced maternal age - [facility] CNM 04/04/2019     Priority: Medium     Level 2 ultrasound on 5/13/19  Fetal echocardiogram d/t IVF pregnancy scheduled for 5/29/19 5/2/19: AFP ordered       Pregnancy resulting from in vitro fertilization, antepartum 04/04/2019     Priority: Medium     Level 2 ultrasound on 5/13/19  Fetal echocardiogram d/t IVF pregnancy scheduled for 5/29/19       Positive GBS test 03/21/2019     Priority: Medium     3/21/19 - rx for Keflex sent for GBS bacteruria  4-6-19 - test of care UC shows persistent bacteruria, need abx treatment [ ]       Seasonal allergic rhinitis 12/14/2015     Priority: Medium       HISTORIES  ==============  ALLERGIES:      Allergies   Allergen Reactions     Ibuprofen Anaphylaxis     PAST MEDICAL HISTORY  Past Medical History:   Diagnosis Date     Allergic rhinitis      Depression     During grad school only, was situational. She received counseling weekly for a period of time. No suicidal ideation. No medicaiton needed.     Hx of previous reproductive problem 2017    3 medicated IUIs, 2 rounds of IVF, 2 FETs, 1 chemical pregn     Rectal bleeding     had colonoscopy all normal, no recurrence     Right wrist fracture 2014     SOCIAL HISTORY  Social History     Socioeconomic History     Marital status:      Spouse name: Brady Dreyer     Number of children: 0     Years of education: Graduate     Highest education level:  Not on file   Occupational History     Occupation: marketing     Employer: [employer]   Social Needs     Financial resource strain: Not on file     Food insecurity:     Worry: Not on file     Inability: Not on file     Transportation needs:     Medical: Not on file     Non-medical: Not on file   Tobacco Use     Smoking status: Never Smoker     Smokeless tobacco: Never Used   Substance and Sexual Activity     Alcohol use: Yes     Alcohol/week: 1.0 standard drinks     Frequency: Monthly or less     Drinks per session: 1 or 2     Binge frequency: Never     Comment: stopped in ifv   and pregnancy     Drug use: No     Sexual activity: Yes     Partners: Male     Birth control/protection: None     Comment:    Lifestyle     Physical activity:     Days per week: Not on file     Minutes per session: Not on file     Stress: Not on file   Relationships     Social connections:     Talks on phone: Not on file     Gets together: Not on file     Attends Jainism service: Not on file     Active member of club or organization: Not on file     Attends meetings of clubs or organizations: Not on file     Relationship status: Not on file     Intimate partner violence:     Fear of current or ex partner: Not on file     Emotionally abused: Not on file     Physically abused: Not on file     Forced sexual activity: Not on file   Other Topics Concern     Parent/sibling w/ CABG, MI or angioplasty before 65F 55M? Not Asked      Service No     Blood Transfusions No     Caffeine Concern No     Comment: 1cpd     Occupational Exposure No     Hobby Hazards No     Sleep Concern No     Comment: Worse recently becasue of buying a house.     Stress Concern No     Weight Concern No     Comment: Gained weight in the past year, maybe stress related.     Special Diet No     Back Care No     Exercise Yes     Comment: 5-6 times running, weights once weekly     Bike Helmet Not Asked     Comment: NA     Seat Belt Yes     Self-Exams No   Social  "History Narrative    Moved from [place] two years ago.  .  No pets.  Works in marketing at [employer].  Pt was trained as a , but was offered a job in marketing. Undergrad in [place].    [employer] grad school in Chemistry. Baking is her hobby. Has friends at work and gym but is still establishing relationships.     Feels safe in environments.  Bought a house in [place].  travels a lot, is an .     YUKI Nix RN, MS,        How much exercise per week? 4-5 times week     How much calcium per day? prenatal       How much caffeine per day? 0    How much vitamin D per day? supplements    Do you/your family wear seatbelts?  Yes    Do you/your family use safety helmets? Yes    Do you/your family use sunscreen? Yes    Do you/your family keep firearms in the home? No    Do you/your family have a smoke detector(s)? Yes    Reviewed [facility] 3-         PARTNER: Mikhail- present at bedside    FAMILY HISTORY  Family History   Adopted: Yes     OB HISTORY  OB History    Para Term  AB Living   1 0 0 0 0 0   SAB TAB Ectopic Multiple Live Births   0 0 0 0 0      # Outcome Date GA Lbr Tyrell/2nd Weight Sex Delivery Anes PTL Lv   1 Current              Prenatal Labs:   Lab Results   Component Value Date    ABO A 2019    RH Pos 2019    AS Neg 2019    HEPBANG Nonreactive 2019    RUQIGG 26 2019    HGB 13.2 2019     Rubella- immune    ULTRASOUND(s) reviewed: in epic    EXAM  ============  /73 (BP Location: Right arm)   Pulse 69   Temp 97.5  F (36.4  C) (Oral)   Resp 16   Ht 1.626 m (5' 4\")   Wt 86.6 kg (191 lb)   BMI 32.79 kg/m    GENERAL APPEARANCE: healthy, alert and no distress  RESP: lungs clear to auscultation - no rales, rhonchi or wheezes  BREAST: normal without masses, tenderness or nipple discharge and no palpable axillary masses or adenopathy  CV: regular rates and rhythm, normal S1 S2, no S3 or S4 and no murmur,and no " varicosities  ABDOMEN:  soft, nontender, no epigastric pain  SKIN: no suspicious lesions or rashes  NEURO: Denies headache, blurred vision, other vision changes  PSYCH: mentation appears normal. and affect normal/bright  MS/ LEGS: Reflexes normal bilaterally    CONTRACTIONS: every 2-7 minutes, mild to nonpalpable, not painful   FETAL HEART TONES: continuous EFM- baseline 135 with moderate variability and positive accelerations. No decelerations.  NST: REACTIVE    PELVIC EXAM: 1/30/-3, post/med  PRESENTATION: VERTEX by BSUS, ledeanne and sve  BLOOD: no  DISCHARGE: none    ROM: pending ROM plus  ROMPLUS: negative    LABS: rom plus  Lab results reviewed- negative    DIAGNOSIS  ============  37w6d seen on [facility] T[facility], r/o SROM  NST: REACTIVE  Fetal Heart rate tracing:category one    Not ruptured  ROM plus NEGATIVE    Patient Active Problem List   Diagnosis     Seasonal allergic rhinitis     Positive GBS test     Antepartum multigravida of advanced maternal age - [facility] CNM     Pregnancy resulting from in vitro fertilization, antepartum     External hemorrhoids     Abnormal glucose in pregnancy, antepartum - Failed 1 hour. Passed 3 hour all 4 values     Supervision of normal first pregnancy in second trimester     Encounter for triage in pregnant patient       PLAN  ============  Reviewed negative ROM+.   Pt has had no leaking of fluid or discharge since 0910.  Reviewed SVE, BOW palpated on exam.   Emphasized labor and rom precautions, fetal movement counts and s/s to return to triage.   Has prenatal appointment scheduled tomorrow with ANA Koo.  Pt verbalized understanding and agrees with plan of care.    Discharged to home in stable condition.    CECELIA Quijano CNM     Fetal Non-Stress Test Results    NST Ordered By: CECELIA Quijano CNM   NST Medical Indication: r/o rom+    NST Start & Stop Times  NST Start Time: 1100  NST Stop Time: 1120      NST Results  Fetus A   Baseline Rate:  135   Accelerations: Present  Decelerations: None  Interpretation: reactive

## 2019-09-29 NOTE — PLAN OF CARE
Data: [facility] at 1013.   Reason for maternal/fetal assessment per patient is Rule out rupture of membranes  . Patient is a . Prenatal record reviewed.      OB History    Para Term  AB Living   1 0 0 0 0 0   SAB TAB Ectopic Multiple Live Births   0 0 0 0 0      # Outcome Date GA Lbr Tyrell/2nd Weight Sex Delivery Anes PTL Lv   1 Current               Medical History:   Past Medical History:   Diagnosis Date    Allergic rhinitis     Depression     During grad school only, was situational. She received counseling weekly for a period of time. No suicidal ideation. No medicaiton needed.    Hx of previous reproductive problem     3 medicated IUIs, 2 rounds of IVF, 2 FETs, 1 chemical pregn    Rectal bleeding     had colonoscopy all normal, no recurrence    Right wrist fracture    . Gestational Age 37w6d. VSS. Cervix: dilated to 1.  Fetal movement present. Patient denies cramping, backache, vaginal discharge, pelvic pressure, UTI symptoms, GI problems, bloody show, vaginal bleeding, edema, headache, visual disturbances, epigastric or URQ pain, abdominal pain, rupture of membranes. Support persons spouse present.  Action: Verbal consent for EFM. Triage assessment completed. EFM applied for fetal assessment. Uterine assessment by [place]. Fetal assessment: Presumed adequate fetal oxygenation documented (see flow record). Patient education pamphlets given on fetal movement counts and when to call provider. Patient instructed to report change in fetal movement, vaginal leaking of fluid or bleeding, abdominal pain, or any concerns related to the pregnancy to her nurse/physician.   Response: ANA Robinrio informed of patient's arrival. Plan per provider is to discharge to home. Patient verbalized understanding of education and verbalized agreement with plan. Discharged ambulatory at 1226.

## 2019-09-29 NOTE — DISCHARGE INSTRUCTIONS
Data: Patient presented [facility] at 1013.   Reason for maternal/fetal assessment per patient is Rule out rupture of membranes  . Patient is a . Prenatal record reviewed.                       OB History    Para Term  AB Living   1 0 0 0 0 0   SAB TAB Ectopic Multiple Live Births      0 0 0 0 0          # Outcome Date GA Lbr Tyrell/2nd Weight Sex Delivery Anes PTL Lv   1 Current                        Medical History:   Past Medical History        Past Medical History:   Diagnosis Date     Allergic rhinitis       Depression       During grad school only, was situational. She received counseling weekly for a period of time. No suicidal ideation. No medicaiton needed.     Hx of previous reproductive problem      3 medicated IUIs, 2 rounds of IVF, 2 FETs, 1 chemical pregn     Rectal bleeding       had colonoscopy all normal, no recurrence     Right wrist fracture       . Gestational Age 37w6d. VSS. Cervix: dilated to 1.  Fetal movement present. Patient denies cramping, backache, vaginal discharge, pelvic pressure, UTI symptoms, GI problems, bloody show, vaginal bleeding, edema, headache, visual disturbances, epigastric or URQ pain, abdominal pain, rupture of membranes. Support persons spouse present.  Action: Verbal consent for EFM. Triage assessment completed. EFM applied for fetal assessment. Uterine assessment by [place]. Fetal assessment: Presumed adequate fetal oxygenation documented (see flow record). Patient education pamphlets given on fetal movement counts and when to call provider. Patient instructed to report change in fetal movement, vaginal leaking of fluid or bleeding, abdominal pain, or any concerns related to the pregnancy to her nurse/physician.   Response: ANA Zachery informed of patient's arrival. Plan per provider is to discharge to home. Patient verbalized understanding of education and verbalized agreement with plan. Discharged ambulatory at 1226.

## 2019-09-30 ENCOUNTER — OFFICE VISIT (OUTPATIENT)
Dept: OBGYN | Facility: CLINIC | Age: 39
End: 2019-09-30
Attending: ADVANCED PRACTICE MIDWIFE
Payer: COMMERCIAL

## 2019-09-30 VITALS
DIASTOLIC BLOOD PRESSURE: 84 MMHG | SYSTOLIC BLOOD PRESSURE: 135 MMHG | WEIGHT: 191.8 LBS | HEART RATE: 71 BPM | BODY MASS INDEX: 32.92 KG/M2

## 2019-09-30 DIAGNOSIS — Z34.03 ENCOUNTER FOR SUPERVISION OF NORMAL FIRST PREGNANCY IN THIRD TRIMESTER: Primary | ICD-10-CM

## 2019-09-30 DIAGNOSIS — O09.819 PREGNANCY RESULTING FROM IN VITRO FERTILIZATION, ANTEPARTUM: ICD-10-CM

## 2019-09-30 PROCEDURE — G0463 HOSPITAL OUTPT CLINIC VISIT: HCPCS | Mod: ZF

## 2019-09-30 ASSESSMENT — PAIN SCALES - GENERAL: PAINLEVEL: NO PAIN (0)

## 2019-09-30 NOTE — LETTER
2019       RE: Isiah Reina   [facility] [address]     Dear Colleague,    Thank you for referring your patient, Isiah Reina, to the [facility] at [facility]. Please see a copy of my visit note below.    Subjective:     39 year old  at 38w0d presents for routine prenatal visit.     No vaginal bleeding or leakage of fluid. Continued mild irregular contractions. Was seen in BP yesterday for rule out labor. Positive fetal movement.       No HA, visual changes, RUQ or epigastric pain.   Patient concerns: Feeling well overall.    Objective:  Vitals:    19 1501   BP: 135/84   Pulse: 71   Weight: 87 kg (191 lb 12.8 oz)   See OB flowsheet    Assessment/Plan  Encounter Diagnosis   Name Primary?     Encounter for supervision of normal first pregnancy in third trimester Yes     Orders Placed This Encounter   Procedures     US OB Fetal Biophys Prf wo NonStrs Singls Sgl     - Reviewed postdates testing including BPP => 41 weeks and rationale for induction of labor based on results.   Patient desires  postdates testing.  - Reviewed why/how to contact provider if headache/visual changes/RUQ or epigastric pain, decreased fetal movement, vaginal bleeding, leakage of fluid or strong/regular contractions.  Patient education/orders or handouts today:  When to call for labor or other concerns, Postdates testing discussion and BPP  Return to clinic in 1 week and prn if questions or concerns.     CECELIA Chaves CNM

## 2019-09-30 NOTE — PROGRESS NOTES
Subjective:     39 year old  at 38w0d presents for routine prenatal visit.            No vaginal bleeding or leakage of fluid. Continued mild irregular contractions. Was seen in BP yesterday for rule out labor. Positive fetal movement.        No HA, visual changes, RUQ or epigastric pain.   Patient concerns: Feeling well overall.  Objective:  Vitals:    19 1501   BP: 135/84   Pulse: 71   Weight: 87 kg (191 lb 12.8 oz)    See OB flowsheet  Assessment/Plan     Encounter Diagnosis   Name Primary?     Encounter for supervision of normal first pregnancy in third trimester Yes     Orders Placed This Encounter   Procedures     US OB Fetal Biophys Prf wo NonStrs Singls Sgl       - Reviewed postdates testing including BPP => 41 weeks and rationale for induction of labor based on results.   Patient desires  postdates testing.  - Reviewed why/how to contact provider if headache/visual changes/RUQ or epigastric pain, decreased fetal movement, vaginal bleeding, leakage of fluid or strong/regular contractions.   Patient education/orders or handouts today:  When to call for labor or other concerns, Postdates testing discussion and BPP  Return to clinic in 1 week and prn if questions or concerns.   CECELIA Chaves CNM

## 2019-09-30 NOTE — NURSING NOTE
Chief Complaint   Patient presents with     Prenatal Care     MALIA 38 weeks   Татьяна Schwarz LPN

## 2019-10-08 ENCOUNTER — OFFICE VISIT (OUTPATIENT)
Dept: OBGYN | Facility: CLINIC | Age: 39
End: 2019-10-08
Attending: ADVANCED PRACTICE MIDWIFE
Payer: COMMERCIAL

## 2019-10-08 VITALS
HEIGHT: 64 IN | SYSTOLIC BLOOD PRESSURE: 125 MMHG | WEIGHT: 193 LBS | BODY MASS INDEX: 32.95 KG/M2 | DIASTOLIC BLOOD PRESSURE: 84 MMHG | HEART RATE: 66 BPM

## 2019-10-08 DIAGNOSIS — Z34.02 SUPERVISION OF NORMAL FIRST PREGNANCY IN SECOND TRIMESTER: Primary | ICD-10-CM

## 2019-10-08 DIAGNOSIS — G56.03 BILATERAL CARPAL TUNNEL SYNDROME: ICD-10-CM

## 2019-10-08 PROBLEM — Z36.89 ENCOUNTER FOR TRIAGE IN PREGNANT PATIENT: Status: RESOLVED | Noted: 2019-09-29 | Resolved: 2019-10-08

## 2019-10-08 ASSESSMENT — MIFFLIN-ST. JEOR: SCORE: 1535.44

## 2019-10-08 ASSESSMENT — PAIN SCALES - GENERAL: PAINLEVEL: NO PAIN (0)

## 2019-10-08 NOTE — LETTER
"10/8/2019       RE: Isiah Reina   [facility] [id number]     Dear Colleague,    Thank you for referring your patient, Isiah Reina, to the [facility] at [facility]. Please see a copy of my visit note below.    Subjective:     39 year old  at 39w1d presents for routine prenatal visit.   Denies vaginal bleeding or leakage of fluid.  Occ BH contractions.  + fetal movement.        No HA, visual changes, RUQ or epigastric pain.   Patient concerns: feeling well overall, ready to have baby   - Reports increased swelling in wrists, occasional tingling/numbness in hands bilaterally throughout the day   - Desires SVE, declines membrane sweep today    Objective:  Vitals:    10/08/19 1127   BP: 125/84   Pulse: 66   Weight: 87.5 kg (193 lb)   Height: 1.626 m (5' 4\")   See OB flowsheet    Assessment/Plan  Encounter Diagnoses   Name Primary?     Supervision of normal first pregnancy in second trimester Yes     Bilateral carpal tunnel syndrome      Orders Placed This Encounter   Procedures     WRIST SPLINT     - Reviewed postdates testing including BPP => 41 weeks and rationale for induction of labor based on results. Patient desires postdates testing.  Has BPP scheduled.  - Reviewed why/how to contact provider if headache/visual changes/RUQ or epigastric pain, decreased fetal movement, vaginal bleeding, leakage of fluid or strong/regular contractions.  - Patient education/orders or handouts today: Sign/symptoms of labor and When to call for labor or other concerns  - Rx sent for wrist splints  - Return to clinic in 1 week and prn if questions or concerns.       Jayce Pierce, CECELIA PEREZ      "

## 2019-10-08 NOTE — PROGRESS NOTES
"Subjective:     39 year old  at 39w1d presents for routine prenatal visit.   Denies vaginal bleeding or leakage of fluid.  Occ BH contractions.  + fetal movement.        No HA, visual changes, RUQ or epigastric pain.   Patient concerns: feeling well overall, ready to have baby   - Reports increased swelling in wrists, occasional tingling/numbness in hands bilaterally throughout the day   - Desires SVE, declines membrane sweep today    Objective:  Vitals:    10/08/19 1127   BP: 125/84   Pulse: 66   Weight: 87.5 kg (193 lb)   Height: 1.626 m (5' 4\")    See OB flowsheet  Assessment/Plan     Encounter Diagnoses   Name Primary?     Supervision of normal first pregnancy in second trimester Yes     Bilateral carpal tunnel syndrome      Orders Placed This Encounter   Procedures     WRIST SPLINT     - Reviewed postdates testing including BPP => 41 weeks and rationale for induction of labor based on results. Patient desires postdates testing.  Has BPP scheduled.  - Reviewed why/how to contact provider if headache/visual changes/RUQ or epigastric pain, decreased fetal movement, vaginal bleeding, leakage of fluid or strong/regular contractions.  - Patient education/orders or handouts today: Sign/symptoms of labor and When to call for labor or other concerns  - Rx sent for wrist splints  - Return to clinic in 1 week and prn if questions or concerns.   CECELIA Hartman CNM  "

## 2019-10-11 ENCOUNTER — TELEPHONE (OUTPATIENT)
Dept: OBGYN | Facility: CLINIC | Age: 39
End: 2019-10-11

## 2019-10-11 ENCOUNTER — HOSPITAL ENCOUNTER (INPATIENT)
Facility: CLINIC | Age: 39
LOS: 3 days | Discharge: HOME HEALTH | End: 2019-10-14
Attending: MIDWIFE | Admitting: ADVANCED PRACTICE MIDWIFE
Payer: COMMERCIAL

## 2019-10-11 LAB
ABO + RH BLD: NORMAL
ABO + RH BLD: NORMAL
BLD GP AB SCN SERPL QL: NORMAL
BLOOD BANK CMNT PATIENT-IMP: NORMAL
SPECIMEN EXP DATE BLD: NORMAL

## 2019-10-11 PROCEDURE — G0463 HOSPITAL OUTPT CLINIC VISIT: HCPCS

## 2019-10-11 PROCEDURE — 12000001 ZZH R&B MED SURG/OB UMMC

## 2019-10-11 PROCEDURE — 86850 RBC ANTIBODY SCREEN: CPT | Performed by: MIDWIFE

## 2019-10-11 PROCEDURE — 25000125 ZZHC RX 250: Performed by: ADVANCED PRACTICE MIDWIFE

## 2019-10-11 PROCEDURE — 86900 BLOOD TYPING SEROLOGIC ABO: CPT | Performed by: MIDWIFE

## 2019-10-11 PROCEDURE — 25000128 H RX IP 250 OP 636: Performed by: MIDWIFE

## 2019-10-11 PROCEDURE — 86901 BLOOD TYPING SEROLOGIC RH(D): CPT | Performed by: MIDWIFE

## 2019-10-11 PROCEDURE — 25800030 ZZH RX IP 258 OP 636: Performed by: MIDWIFE

## 2019-10-11 PROCEDURE — 86780 TREPONEMA PALLIDUM: CPT | Performed by: MIDWIFE

## 2019-10-11 RX ORDER — NALOXONE HYDROCHLORIDE 0.4 MG/ML
.1-.4 INJECTION, SOLUTION INTRAMUSCULAR; INTRAVENOUS; SUBCUTANEOUS
Status: DISCONTINUED | OUTPATIENT
Start: 2019-10-11 | End: 2019-10-12

## 2019-10-11 RX ORDER — OXYCODONE AND ACETAMINOPHEN 5; 325 MG/1; MG/1
1 TABLET ORAL
Status: DISCONTINUED | OUTPATIENT
Start: 2019-10-11 | End: 2019-10-12

## 2019-10-11 RX ORDER — SODIUM CHLORIDE, SODIUM LACTATE, POTASSIUM CHLORIDE, CALCIUM CHLORIDE 600; 310; 30; 20 MG/100ML; MG/100ML; MG/100ML; MG/100ML
INJECTION, SOLUTION INTRAVENOUS CONTINUOUS
Status: DISCONTINUED | OUTPATIENT
Start: 2019-10-11 | End: 2019-10-12

## 2019-10-11 RX ORDER — CARBOPROST TROMETHAMINE 250 UG/ML
250 INJECTION, SOLUTION INTRAMUSCULAR
Status: DISCONTINUED | OUTPATIENT
Start: 2019-10-11 | End: 2019-10-12

## 2019-10-11 RX ORDER — LIDOCAINE 40 MG/G
CREAM TOPICAL
Status: DISCONTINUED | OUTPATIENT
Start: 2019-10-11 | End: 2019-10-12

## 2019-10-11 RX ORDER — FENTANYL CITRATE 50 UG/ML
50-100 INJECTION, SOLUTION INTRAMUSCULAR; INTRAVENOUS
Status: DISCONTINUED | OUTPATIENT
Start: 2019-10-11 | End: 2019-10-12

## 2019-10-11 RX ORDER — OXYTOCIN/0.9 % SODIUM CHLORIDE 30/500 ML
1-24 PLASTIC BAG, INJECTION (ML) INTRAVENOUS CONTINUOUS
Status: DISCONTINUED | OUTPATIENT
Start: 2019-10-11 | End: 2019-10-12

## 2019-10-11 RX ORDER — PENICILLIN G POTASSIUM 5000000 [IU]/1
5 INJECTION, POWDER, FOR SOLUTION INTRAMUSCULAR; INTRAVENOUS ONCE
Status: COMPLETED | OUTPATIENT
Start: 2019-10-11 | End: 2019-10-11

## 2019-10-11 RX ORDER — METHYLERGONOVINE MALEATE 0.2 MG/ML
200 INJECTION INTRAVENOUS
Status: DISCONTINUED | OUTPATIENT
Start: 2019-10-11 | End: 2019-10-12

## 2019-10-11 RX ORDER — ACETAMINOPHEN 325 MG/1
650 TABLET ORAL EVERY 4 HOURS PRN
Status: DISCONTINUED | OUTPATIENT
Start: 2019-10-11 | End: 2019-10-12

## 2019-10-11 RX ORDER — OXYTOCIN/0.9 % SODIUM CHLORIDE 30/500 ML
100-340 PLASTIC BAG, INJECTION (ML) INTRAVENOUS CONTINUOUS PRN
Status: DISCONTINUED | OUTPATIENT
Start: 2019-10-11 | End: 2019-10-12

## 2019-10-11 RX ORDER — OXYTOCIN 10 [USP'U]/ML
10 INJECTION, SOLUTION INTRAMUSCULAR; INTRAVENOUS
Status: DISCONTINUED | OUTPATIENT
Start: 2019-10-11 | End: 2019-10-12

## 2019-10-11 RX ORDER — ONDANSETRON 2 MG/ML
4 INJECTION INTRAMUSCULAR; INTRAVENOUS EVERY 6 HOURS PRN
Status: DISCONTINUED | OUTPATIENT
Start: 2019-10-11 | End: 2019-10-12

## 2019-10-11 RX ADMIN — PENICILLIN G POTASSIUM 5 MILLION UNITS: 5000000 POWDER, FOR SOLUTION INTRAMUSCULAR; INTRAPLEURAL; INTRATHECAL; INTRAVENOUS at 16:31

## 2019-10-11 RX ADMIN — Medication 2 MILLI-UNITS/MIN: at 22:27

## 2019-10-11 RX ADMIN — Medication 2.5 MILLION UNITS: at 20:25

## 2019-10-11 RX ADMIN — SODIUM CHLORIDE, POTASSIUM CHLORIDE, SODIUM LACTATE AND CALCIUM CHLORIDE: 600; 310; 30; 20 INJECTION, SOLUTION INTRAVENOUS at 22:27

## 2019-10-11 NOTE — H&P
"ADMIT NOTE  =================  39w4d    Isiah Reina is a 39 year old female with an No LMP recorded. Patient is pregnant. and Estimated Date of Delivery: Oct 14, 2019 [facility] at 3:57 PM with in early labor with SROM .     HPI  ================  Noted SROM around 1pm clear had large gushin car on the way in to birth [facility]   Contractions- mild  Fetal movement- active  ROM- yes  Vaginal bleeding- none  GBS- positive- antibiotics started  FOB- is involved,   Other labor support- plans  support     Weight gain- 193 - 175 lbs, Total weight gain- 18 lbs  Height- 5'4\"  BMI- 30  First prenatal visit at 12 weeks, Total visits- 13    PROBLEM LIST  =================  Patient Active Problem List    Diagnosis Date Noted     Supervision of normal first pregnancy in second trimester 2019     Priority: Medium     Abnormal glucose in pregnancy, antepartum - Failed 1 hour. Passed 3 hour all 4 values 2019     Priority: Medium     External hemorrhoids 2019     Priority: Medium     Antepartum multigravida of advanced maternal age - [facility] CNM 2019     Priority: Medium     Level 2 ultrasound on 19  Fetal echocardiogram d/t IVF pregnancy scheduled for 19: AFP ordered       Pregnancy resulting from in vitro fertilization, antepartum 2019     Priority: Medium     Level 2 ultrasound on 19  Fetal echocardiogram d/t IVF pregnancy scheduled for 19       Positive GBS test 2019     Priority: Medium     3/21/19 - rx for Keflex sent for GBS bacteruria  19 - test of care UC shows persistent bacteruria, need abx treatment [ ]       Seasonal allergic rhinitis 2015     Priority: Medium       HISTORIES  ============  Allergies   Allergen Reactions     Ibuprofen Anaphylaxis     Past Medical History:   Diagnosis Date     Allergic rhinitis      Depression     During grad school only, was situational. She received counseling weekly " for a period of time. No suicidal ideation. No medicaiton needed.     Hx of previous reproductive problem     3 medicated IUIs, 2 rounds of IVF, 2 FETs, 1 chemical pregn     Rectal bleeding     had colonoscopy all normal, no recurrence     Right wrist fracture      Past Surgical History:   Procedure Laterality Date     COLONOSCOPY  ~10 years ago     ivf   egg retreival      times 2   .  Family History   Adopted: Yes     Social History     Tobacco Use     Smoking status: Never Smoker     Smokeless tobacco: Never Used   Substance Use Topics     Alcohol use: Yes     Alcohol/week: 1.0 standard drinks     Frequency: Monthly or less     Drinks per session: 1 or 2     Binge frequency: Never     Comment: stopped in ifv   and pregnancy     OB History    Para Term  AB Living   1 0 0 0 0 0   SAB TAB Ectopic Multiple Live Births   0 0 0 0 0      # Outcome Date GA Lbr Tyrell/2nd Weight Sex Delivery Anes PTL Lv   1 Current                 LABS:   ===========  Prenatal Labs:  Rhogam not indicated   Lab Results   Component Value Date    ABO A 2019    RH Pos 2019    AS Neg 2019    HEPBANG Nonreactive 2019    RUQIGG 26 2019    HGB 13.2 2019     Rubella immune   GBS POS   Other labs:  No results found for this or any previous visit (from the past 24 hour(s)).    ROS  =========  Pt denies significant respiratory, cardiovacular, GI, or muscular/skeletalcomplaints.    See RN data base ROS.       PHYSICAL EXAM:  ===============  There were no vitals taken for this visit.  General appearance: comfortable  GENERAL APPEARANCE: healthy, alert and no distress  RESP: lungs clear to auscultation - no rales, rhonchi or wheezes  CV: regular rates and rhythm, normal S1 S2, no S3 or S4 and no murmur,and no varicosities  ABDOMEN:  soft, nontender, no epigastric pain  SKIN: no suspicious lesions or rashes  NEURO: Denies headache, blurred vision, other vision changes  PSYCH: mentation appears  normal. and affect normal/bright  Legs: Reflexes normal bilaterally     Abdomen: gravid, vertex fetus per Leopold's, non-tender between contractions.   Cephalic presentation confirmed by BSUS  EFW-  7 3/4 lbs.   CONTACTIONS: mild and every 3 minutes  FETAL HEART TONES: continuous EFM- baseline 145 with moderate variability and positive accelerations. No decelerations.  PELVIC EXAM: 3/ 70%/ Mid/ soft/ -2   ÁLVAREZ SCORE: 8  BLOODY SHOW: no   ROM:yes, moderate, clear  FLUID: clear  ROMPLUS: not done    # Pain Assessment:  Current Pain Score 2019   Patient currently in pain? denies   Donde s pain level was assessed and she currently denies pain.        ASSESSMENT:  ==============  IUP @ 39w4d admitted in early labor and SROM x 3 hours   NST REACTIVE  Fetal Heart Rate - category one  GBS- positive- antibiotics started    Patient Active Problem List   Diagnosis     Seasonal allergic rhinitis     Positive GBS test     Antepartum multigravida of advanced maternal age - [facility] CNM     Pregnancy resulting from in vitro fertilization, antepartum     External hemorrhoids     Abnormal glucose in pregnancy, antepartum - Failed 1 hour. Passed 3 hour all 4 values     Supervision of normal first pregnancy in second trimester       PLAN:  ===========  Admit - see IP orders  Ambulation, hydration, position changes, birthing ball and tub options to facilitate labor reviewed with pt .  Anticipate   CECELIA Strauss CNM

## 2019-10-11 NOTE — TELEPHONE ENCOUNTER
"S: Isiah is calling noting \"small gush of fluid.\"     B:  at 39w2d. GBS+    A: Patient states she noted a small gush (states \"about 2 tablespoons) of clear fluid at approximately 1330 today. Since then, has noted similar amounts of leaking fluid 4-5 more times. States when she used the bathroom, she noted some blood-tinged mucous when wiping. Notes mild contractions, irregular. Positive fetal movement. Denies headache, vision changes, or RUQ pain.     R: Patient tentative to seek evaluation as she was ruled out for rupture about a week ago and sent home. Discussed with on-call ANA Perdomo. She recommends evaluation for rule out [facility] at [facility]. Discussed plan with Isiah who is in agreement.   "

## 2019-10-11 NOTE — PLAN OF CARE
Pt [facility] to [facility] at 1540 grossly ruptured. Per pt she started leaking clear fluid around 1330 today. Cici Q2-4 minutes, palpate mild. Pt GBS positive. Loading dose of PCN started at 1630. Pt undecided on pain control in active labor. Plans on having  come when more uncomfortable. Spouse at bedside. Continue plan of care.

## 2019-10-12 ENCOUNTER — ANESTHESIA EVENT (OUTPATIENT)
Dept: OBGYN | Facility: CLINIC | Age: 39
End: 2019-10-12
Payer: COMMERCIAL

## 2019-10-12 ENCOUNTER — ANESTHESIA (OUTPATIENT)
Dept: OBGYN | Facility: CLINIC | Age: 39
End: 2019-10-12
Payer: COMMERCIAL

## 2019-10-12 LAB
ALT SERPL W P-5'-P-CCNC: 16 U/L (ref 0–50)
AST SERPL W P-5'-P-CCNC: 19 U/L (ref 0–45)
ERYTHROCYTE [DISTWIDTH] IN BLOOD BY AUTOMATED COUNT: 12.9 % (ref 10–15)
HCT VFR BLD AUTO: 39.9 % (ref 35–47)
HGB BLD-MCNC: 14 G/DL (ref 11.7–15.7)
MCH RBC QN AUTO: 31.3 PG (ref 26.5–33)
MCHC RBC AUTO-ENTMCNC: 35.1 G/DL (ref 31.5–36.5)
MCV RBC AUTO: 89 FL (ref 78–100)
PLATELET # BLD AUTO: 144 10E9/L (ref 150–450)
RBC # BLD AUTO: 4.48 10E12/L (ref 3.8–5.2)
T PALLIDUM AB SER QL: NONREACTIVE
URATE SERPL-MCNC: 4.6 MG/DL (ref 2.6–6)
WBC # BLD AUTO: 16.8 10E9/L (ref 4–11)

## 2019-10-12 PROCEDURE — 84550 ASSAY OF BLOOD/URIC ACID: CPT | Performed by: ADVANCED PRACTICE MIDWIFE

## 2019-10-12 PROCEDURE — 72200001 ZZH LABOR CARE VAGINAL DELIVERY SINGLE

## 2019-10-12 PROCEDURE — 85027 COMPLETE CBC AUTOMATED: CPT | Performed by: ADVANCED PRACTICE MIDWIFE

## 2019-10-12 PROCEDURE — 25000128 H RX IP 250 OP 636: Performed by: MIDWIFE

## 2019-10-12 PROCEDURE — 25000128 H RX IP 250 OP 636: Performed by: ANESTHESIOLOGY

## 2019-10-12 PROCEDURE — 12000001 ZZH R&B MED SURG/OB UMMC

## 2019-10-12 PROCEDURE — 0HQ9XZZ REPAIR PERINEUM SKIN, EXTERNAL APPROACH: ICD-10-PCS | Performed by: ADVANCED PRACTICE MIDWIFE

## 2019-10-12 PROCEDURE — 25000132 ZZH RX MED GY IP 250 OP 250 PS 637: Performed by: ADVANCED PRACTICE MIDWIFE

## 2019-10-12 PROCEDURE — 84460 ALANINE AMINO (ALT) (SGPT): CPT | Performed by: ADVANCED PRACTICE MIDWIFE

## 2019-10-12 PROCEDURE — 25800030 ZZH RX IP 258 OP 636: Performed by: ANESTHESIOLOGY

## 2019-10-12 PROCEDURE — 40000671 ZZH STATISTIC ANESTHESIA CASE

## 2019-10-12 PROCEDURE — 84450 TRANSFERASE (AST) (SGOT): CPT | Performed by: ADVANCED PRACTICE MIDWIFE

## 2019-10-12 PROCEDURE — 25000125 ZZHC RX 250: Performed by: MIDWIFE

## 2019-10-12 PROCEDURE — 25800030 ZZH RX IP 258 OP 636: Performed by: MIDWIFE

## 2019-10-12 PROCEDURE — 3E0R3BZ INTRODUCTION OF ANESTHETIC AGENT INTO SPINAL CANAL, PERCUTANEOUS APPROACH: ICD-10-PCS | Performed by: ANESTHESIOLOGY

## 2019-10-12 PROCEDURE — 36415 COLL VENOUS BLD VENIPUNCTURE: CPT | Performed by: ADVANCED PRACTICE MIDWIFE

## 2019-10-12 RX ORDER — OXYTOCIN 10 [USP'U]/ML
INJECTION, SOLUTION INTRAMUSCULAR; INTRAVENOUS
Status: DISCONTINUED
Start: 2019-10-12 | End: 2019-10-12 | Stop reason: WASHOUT

## 2019-10-12 RX ORDER — NALOXONE HYDROCHLORIDE 0.4 MG/ML
.1-.4 INJECTION, SOLUTION INTRAMUSCULAR; INTRAVENOUS; SUBCUTANEOUS
Status: DISCONTINUED | OUTPATIENT
Start: 2019-10-12 | End: 2019-10-14 | Stop reason: HOSPADM

## 2019-10-12 RX ORDER — OXYTOCIN 10 [USP'U]/ML
10 INJECTION, SOLUTION INTRAMUSCULAR; INTRAVENOUS
Status: DISCONTINUED | OUTPATIENT
Start: 2019-10-12 | End: 2019-10-14 | Stop reason: HOSPADM

## 2019-10-12 RX ORDER — OXYTOCIN/0.9 % SODIUM CHLORIDE 30/500 ML
PLASTIC BAG, INJECTION (ML) INTRAVENOUS
Status: DISCONTINUED
Start: 2019-10-12 | End: 2019-10-12 | Stop reason: HOSPADM

## 2019-10-12 RX ORDER — AMOXICILLIN 250 MG
2 CAPSULE ORAL 2 TIMES DAILY
Status: DISCONTINUED | OUTPATIENT
Start: 2019-10-12 | End: 2019-10-12 | Stop reason: SINTOL

## 2019-10-12 RX ORDER — ACETAMINOPHEN 325 MG/1
650 TABLET ORAL EVERY 4 HOURS PRN
Status: DISCONTINUED | OUTPATIENT
Start: 2019-10-12 | End: 2019-10-14 | Stop reason: HOSPADM

## 2019-10-12 RX ORDER — NALOXONE HYDROCHLORIDE 0.4 MG/ML
.1-.4 INJECTION, SOLUTION INTRAMUSCULAR; INTRAVENOUS; SUBCUTANEOUS
Status: DISCONTINUED | OUTPATIENT
Start: 2019-10-12 | End: 2019-10-12

## 2019-10-12 RX ORDER — HYDROCORTISONE 2.5 %
CREAM (GRAM) TOPICAL 3 TIMES DAILY PRN
Status: DISCONTINUED | OUTPATIENT
Start: 2019-10-12 | End: 2019-10-14 | Stop reason: HOSPADM

## 2019-10-12 RX ORDER — BISACODYL 10 MG
10 SUPPOSITORY, RECTAL RECTAL DAILY PRN
Status: DISCONTINUED | OUTPATIENT
Start: 2019-10-14 | End: 2019-10-14 | Stop reason: HOSPADM

## 2019-10-12 RX ORDER — MISOPROSTOL 200 UG/1
TABLET ORAL
Status: DISCONTINUED
Start: 2019-10-12 | End: 2019-10-12 | Stop reason: HOSPADM

## 2019-10-12 RX ORDER — OXYTOCIN/0.9 % SODIUM CHLORIDE 30/500 ML
100 PLASTIC BAG, INJECTION (ML) INTRAVENOUS CONTINUOUS
Status: DISCONTINUED | OUTPATIENT
Start: 2019-10-12 | End: 2019-10-14 | Stop reason: HOSPADM

## 2019-10-12 RX ORDER — LANOLIN 100 %
OINTMENT (GRAM) TOPICAL
Status: DISCONTINUED | OUTPATIENT
Start: 2019-10-12 | End: 2019-10-14 | Stop reason: HOSPADM

## 2019-10-12 RX ORDER — OXYTOCIN/0.9 % SODIUM CHLORIDE 30/500 ML
340 PLASTIC BAG, INJECTION (ML) INTRAVENOUS CONTINUOUS PRN
Status: DISCONTINUED | OUTPATIENT
Start: 2019-10-12 | End: 2019-10-14 | Stop reason: HOSPADM

## 2019-10-12 RX ORDER — NALBUPHINE HYDROCHLORIDE 10 MG/ML
2.5-5 INJECTION, SOLUTION INTRAMUSCULAR; INTRAVENOUS; SUBCUTANEOUS EVERY 6 HOURS PRN
Status: DISCONTINUED | OUTPATIENT
Start: 2019-10-12 | End: 2019-10-12

## 2019-10-12 RX ORDER — DOCUSATE SODIUM 100 MG/1
100 CAPSULE, LIQUID FILLED ORAL 2 TIMES DAILY
Status: DISCONTINUED | OUTPATIENT
Start: 2019-10-12 | End: 2019-10-12

## 2019-10-12 RX ORDER — LIDOCAINE HYDROCHLORIDE 10 MG/ML
INJECTION, SOLUTION EPIDURAL; INFILTRATION; INTRACAUDAL; PERINEURAL
Status: DISCONTINUED
Start: 2019-10-12 | End: 2019-10-12 | Stop reason: HOSPADM

## 2019-10-12 RX ORDER — AMOXICILLIN 250 MG
1 CAPSULE ORAL 2 TIMES DAILY
Status: DISCONTINUED | OUTPATIENT
Start: 2019-10-12 | End: 2019-10-12 | Stop reason: SINTOL

## 2019-10-12 RX ORDER — EPHEDRINE SULFATE 50 MG/ML
5 INJECTION, SOLUTION INTRAMUSCULAR; INTRAVENOUS; SUBCUTANEOUS
Status: DISCONTINUED | OUTPATIENT
Start: 2019-10-12 | End: 2019-10-12

## 2019-10-12 RX ORDER — DOCUSATE SODIUM 100 MG/1
100 CAPSULE, LIQUID FILLED ORAL 2 TIMES DAILY PRN
Status: DISCONTINUED | OUTPATIENT
Start: 2019-10-13 | End: 2019-10-14 | Stop reason: HOSPADM

## 2019-10-12 RX ADMIN — FENTANYL CITRATE: 50 INJECTION INTRAVENOUS at 08:13

## 2019-10-12 RX ADMIN — Medication 2.5 MILLION UNITS: at 09:24

## 2019-10-12 RX ADMIN — LIDOCAINE HYDROCHLORIDE 0.1 ML: 10 INJECTION, SOLUTION EPIDURAL; INFILTRATION; INTRACAUDAL; PERINEURAL at 12:39

## 2019-10-12 RX ADMIN — FENTANYL CITRATE 10 ML/HR: 50 INJECTION INTRAVENOUS at 08:09

## 2019-10-12 RX ADMIN — Medication 2.5 MILLION UNITS: at 00:36

## 2019-10-12 RX ADMIN — SENNOSIDES AND DOCUSATE SODIUM 2 TABLET: 8.6; 5 TABLET ORAL at 19:53

## 2019-10-12 RX ADMIN — Medication 2.5 MILLION UNITS: at 04:53

## 2019-10-12 RX ADMIN — ACETAMINOPHEN 650 MG: 325 TABLET, FILM COATED ORAL at 23:54

## 2019-10-12 RX ADMIN — ACETAMINOPHEN 650 MG: 325 TABLET, FILM COATED ORAL at 19:53

## 2019-10-12 RX ADMIN — FENTANYL CITRATE 50 MCG: 50 INJECTION INTRAMUSCULAR; INTRAVENOUS at 09:05

## 2019-10-12 RX ADMIN — SODIUM CHLORIDE, POTASSIUM CHLORIDE, SODIUM LACTATE AND CALCIUM CHLORIDE 1000 ML: 600; 310; 30; 20 INJECTION, SOLUTION INTRAVENOUS at 07:37

## 2019-10-12 NOTE — PROGRESS NOTES
Blood pressure 135/87, temperature 97.9  F (36.6  C), temperature source Oral, resp. rate 16, not currently breastfeeding.    General appearance: comfortable in bed, reports contractions occasionally uncomfortable  CONTRACTIONS: mild and every 2-7 minutes  Pitocin- none,  Antibiotics- PCN  FETAL HEART TONES: continuous EFM- baseline 130 with moderate variability and positive accelerations. No decelerations.  ROM: clear fluid  PELVIC EXAM: 4 / 70%/ -2  Perkins score: 8-9    ASSESSMENT:  ==============  IUP @ 39w4d SROM  Fetal Heart Rate Tracing category one  GBS- positive  SROM x 9 hrs, afebrile    PLAN:  ===========  Ambulation, hydration, position changes, birthing ball/sling and tub options to facilitate labor.  Pain medication options reviewed with pt. Pt is flexible.  Reevaluate in 2-4 hours prn  Labor augmentation with Pitocin reviewed with pt. Agreeable to plan.  Continue prophylactic IV antibiotic PCN for positive GBS status.     CECELIA HartmanM

## 2019-10-12 NOTE — PROVIDER NOTIFICATION
10/12/19 0905   Provider Notification   Provider Name/Title keshawn colindres cnm   Method of Notification In Department   UPDATED ON SVE. FENTANYL GIVEN. DIFFICULTY INSERTION OF STR CATH.

## 2019-10-12 NOTE — PLAN OF CARE
Patient arrived to [facility] unit via wheelchair at 1515,with belongings, accompanied by spouse/ significant other, with infant in arms. Received report from Estelle West and checked bands. Unit and room orientation started. Call light within arms reach; no concerns present at this time. Continue with plan of care.

## 2019-10-12 NOTE — PROGRESS NOTES
Blood pressure 135/87, temperature 97.7  F (36.5  C), temperature source Oral, resp. rate 16, not currently breastfeeding.    General appearance: pt ambulating hallways with partner, reports contractions beginning to feel more uncomfortable   CONTRACTIONS: mild and every 3-5 minutes; some loss of contact with monitor d/t ambulation  Pitocin- none,  Antibiotics- PCN  FETAL HEART TONES: continuous EFM- baseline 145 with moderate variability and positive accelerations. No decelerations.  Some loss of contact with monitor d/t ambulation.  ROM: clear fluid  PELVIC EXAM:deferred    ASSESSMENT:  ==============  IUP @ 39w4d in early labor   Fetal Heart Rate Tracing category one  GBS- positive- antibiotics started  SROM x 6 hrs, afebrile    PLAN:  ===========  Ambulation, hydration, position changes, birthing ball/sling and tub options to facilitate labor.  Continue prophylactic IV antibiotic PCN for positive GBS status..  Discussed possibility of Pitocin augmentation if contraction pattern changes/spaces or labor does not progress.   Reevaluate in 2-4 hours CECELIA JacobsM

## 2019-10-12 NOTE — PROGRESS NOTES
Labor progress note    S:  Patient reporting increased pelvic pressure and would like a cervical exam.  Has been upright and changing positions frequently.  Using Nitrous Oxide for some pain relief.  Now feeling frustration about lack of progress, would like to discuss other options for comfort.    O:  Blood pressure (!) 143/83, temperature 98  F (36.7  C), temperature source Oral, resp. rate 16, not currently breastfeeding.  General appearance: uncomfortable with contractions.  Contractions: Every 2-3 minutes.  seconds duration.  Palpate: strong.  FHT: Baseline 145 with moderate variability. Accelerations are present. Variable decelerations present.  ROM: clear fluid. Membranes have been ruptured for 18.5 hours.  Pelvic exam: 6/ 100%/ Mid/ average/ -1    Pitocin- 10mU,  Antibiotics- PCNx4 doses      A:  IUP @ 39w5d minimal/no progress   Fetal Heart rate tracing Category category one  GBS- positive  Patient Active Problem List   Diagnosis     Seasonal allergic rhinitis     Positive GBS test     Antepartum multigravida of advanced maternal age - [facility] CNM     Pregnancy resulting from in vitro fertilization, antepartum     External hemorrhoids     Abnormal glucose in pregnancy, antepartum - Failed 1 hour. Passed 3 hour all 4 values     Supervision of normal first pregnancy in second trimester         P:  Pain medication Patient now requesting labor epidural, anesthesiologist notified of patient request   Re-evaluate in 2-4 hours as needed  CECELIA Mcmanus CNM

## 2019-10-12 NOTE — L&D DELIVERY NOTE
Delivery Summary    Isiah Reina MRN# [id number]   Age: 39 year old YOB: 1980     ASSESSMENT & PLAN: DELIVERY NOTE:  Brief Labor Course: Patient presented to labor and delivery on 10/11/19 following spontaneous rupture of membranes, antibiotic prophylaxis initiated for GBS positive status.  Patient desired expectant management, but eventually consented to Pitocin augmentation.  Patient used Nitrous oxide and labor epidural for pain relief.  Epidural placed, but did not provide complete relief.    Delivery Note:   Patient progressed to complete at 1139 and began active pushing efforts when an anterior lip of cervix was easily reduced with one contraction.  Steady progress made with each push.  Infant brought to a [facility] and delivered MOA, restituted ROT with right (posterior) nuchal arm.  With the next contraction posterior arm delivered and anterior shoulder delivered with ease.  Loose nuchal cord noted x1 not reduced.  IV Pitocin infusing prior to delivery of placenta.  Placenta delivered spontaneously intact with 3 vessel cord, inspected and found to be intact.  Perineum inspected, small right periurethral laceration identified, it was hemostatic and did not require repair.  Shallow first degree laceration noted that required 2 interrupted sutures to achieve hemostasis under local anesthetic using 3-0 Vicryl.  Mother and baby stable in recovery.    IUP at 39 weeks gestation delivered on 2019.     delivery of a viable Female infant.  Weight : 9 pounds 12 ounces   Apgars of 8 at 1 minute and 9 at 5 minutes.  Labor was augmented.  Medications administered  in labor:  Pain Rx Epidural, Fentanyl and Nitrous Oxide; Antibiotics Yes: PCN and IV antibiotics infused greater than 4 hours;   Perineum: 1st degree  Placenta-mechanism: spontaneous, intact,  with a 3 vessel cord. IV oxytocin was given After delivery of baby Before delivery of placenta  Quantitative Blood Loss was  209cc.  Complications of labor and delivery: None  Anticipated Discharge Date: 10/14/2019  Birth attendants: CAROLANN Monteiro APRN CNM       Labor Event Times    Labor onset date:  10/12/19 Onset time:   4:00 AM   Dilation complete date:  10/12/19 Complete time:  11:39 AM   Start pushing date/time:  10/12/2019 1139      Labor Length    1st Stage (hrs):  7 (min):  39   2nd Stage (hrs):  0 (min):  43   3rd Stage (hrs):  0 (min):  13      Labor Events     labor?:  No   steroids:  None  Labor Type:  Augmentation  Predominate monitoring during 1st stage:  continuous electronic fetal monitoring     Antibiotics received during labor?:  Yes     Rupture date/time: 19 0910   Rupture type:  Spontaneous rupture of membranes occuring during spontaneous labor or augmentation  Fluid color:  Clear, Pink  Fluid odor:  Normal     Augmentation:  Oxytocin  1:1 continuous labor support provided by?:  JUVE manrique, provider       Delivery/Placenta Date and Time    Delivery Date:  10/12/19 Delivery Time:  12:22 PM   Placenta Date/Time:  10/12/2019 12:35 PM  Oxytocin given at the time of delivery:  after delivery of baby     Vaginal Counts     Initial count performed by 2 team members:   Two Team Members   carolann cerna       Needles Suture [place] Sponges Instruments   Initial counts 2 0 5    Added to count 0 1 0    Final counts 2 1 5    Placed during labor Accounted for at the end of labor   No NA   No NA   No NA    Final count performed by 2 team members:   Two Team Members   modesta quick cnm      Final count correct?:  Yes         Apgars    Living status:  Living   1 Minute 5 Minute 10 Minute 15 Minute 20 Minute   Skin color: 1  1       Heart rate: 2  2       Reflex irritability: 1  2       Muscle tone: 2  2       Respiratory effort: 2  2       Total: 8  9       Apgars assigned by:  TIARA MAYNARD RN     Cord     Complications:  Nuchal   Cord Blood Disposition:  Lab Gases  "Sent?:  No      [place] Resuscitation    Methods:  None        Care at Delivery:  Dried and stim for crying, lungs clearing with crying, pinked up well. Terminal mec.  Output in Delivery Room:  Stool     [place] Measurements    Weight:  9 lb 11.9 oz Length:  1' 10\"   Head circumference:  36.2 cm       Skin to Skin and Feeding Plan    Skin to skin initiation date/time: 1/10/1841    Skin to skin with:  Mother  Skin to skin end date/time:     How do you plan to feed your baby:  Breastfeeding     Labor Events and Shoulder Dystocia    Fetal Tracing Prior to Delivery:  Category 1  Shoulder dystocia present?:  Neg             Delivery (Maternal) (Provider to Complete) ([id number])    Episiotomy:  None  Perineal lacerations:  1st Repaired?:  Yes   Periurethral laceration:  right Repaired?:  No      Blood Loss  Mother: Isiah Reina #[id number]   Start of Mother's Information    IO Blood Loss  10/12/19 0400 - 10/12/19 1427    Delivery QBL (mL) Hospital Encounter 209 mL    Total  209 mL         End of Mother's Information  Mother: Isiah Reina #[id number]         Delivery - Provider to Complete ([id number])    Delivering clinician:  Annika Koo APRN CNM  CNM Care:  Exclusive CNM care in labor  Attempted Delivery Types (Choose all that apply):  Spontaneous Vaginal Delivery  Delivery Type (Choose the 1 that will go to the Birth History):  Vaginal, Spontaneous          Placenta    Delayed Cord Clamping:  Done  Date/Time:  10/12/2019 12:35 PM  Removal:  Spontaneous  Comments:  Via Schultze mechanism intact with 3 vessel cord  Disposition:  Hospital disposal     Anesthesia    Method:  Epidural, Nitrous Oxide  Cervical dilation at placement:  4-7          Presentation and Position    Presentation:  Vertex  Position:  Middle Occiput Anterior           CECELIA Mcmanus CNM  "

## 2019-10-12 NOTE — ANESTHESIA PREPROCEDURE EVALUATION
Anesthesia Pre-Procedure Evaluation    Patient: Isiah Reina   MRN:     [id number] Gender:   female   Age:    39 year old :      1980        Preoperative Diagnosis: * No surgery found *        Past Medical History:   Diagnosis Date     Allergic rhinitis      Depression     During grad school only, was situational. She received counseling weekly for a period of time. No suicidal ideation. No medicaiton needed.     Hx of previous reproductive problem     3 medicated IUIs, 2 rounds of IVF, 2 FETs, 1 chemical pregn     Rectal bleeding     had colonoscopy all normal, no recurrence     Right wrist fracture       Past Surgical History:   Procedure Laterality Date     COLONOSCOPY  ~10 years ago     ivf   egg retreival      times 2          Anesthesia Evaluation     .      No history of anesthetic complications          ROS/MED HX    ENT/Pulmonary:  - neg pulmonary ROS     Neurologic:  - neg neurologic ROS     Cardiovascular:  - neg cardiovascular ROS       METS/Exercise Tolerance:     Hematologic:         Musculoskeletal:         GI/Hepatic:     (+) GERD       Renal/Genitourinary:         Endo:         Psychiatric:         Infectious Disease:         Malignancy:         Other:                     JZG FV AN PHYSICAL EXAM    LABS:  CBC:   Lab Results   Component Value Date    WBC 16.8 (H) 10/12/2019    WBC 8.9 2019    HGB 14.0 10/12/2019    HGB 13.2 2019    HCT 39.9 10/12/2019    HCT 38.0 2019     (L) 10/12/2019     2019     BMP:   Lab Results   Component Value Date    .0 2018    POTASSIUM 3.7 2018    CHLORIDE 103.0 2018    CO2 27.0 2018    BUN 15.0 2018    CR 0.5 (L) 2018    .0 (H) 2018    .0 (H) 2018     COAGS: No results found for: PTT, INR, FIBR  POC:   Lab Results   Component Value Date    BGM 88 2019     OTHER:   Lab Results   Component Value Date    LUIS 9.2 2018    ALT 16 10/12/2019  "   AST 19 10/12/2019    TSH 1.23 02/06/2019    T4 1.16 02/06/2019        Preop Vitals    BP Readings from Last 3 Encounters:   10/12/19 (!) 143/83   10/08/19 125/84   09/30/19 135/84    Pulse Readings from Last 3 Encounters:   10/08/19 66   09/30/19 71   09/29/19 69      Resp Readings from Last 3 Encounters:   10/12/19 16   09/29/19 16   08/01/19 16    SpO2 Readings from Last 3 Encounters:   08/15/19 98%   02/23/18 97%   11/11/16 99%      Temp Readings from Last 1 Encounters:   10/12/19 36.7  C (98  F) (Oral)    Ht Readings from Last 1 Encounters:   10/08/19 1.626 m (5' 4\")      Wt Readings from Last 1 Encounters:   10/08/19 87.5 kg (193 lb)    Estimated body mass index is 33.13 kg/m  as calculated from the following:    Height as of 10/8/19: 1.626 m (5' 4\").    Weight as of 10/8/19: 87.5 kg (193 lb).     LDA:  Peripheral IV 10/12/19 Right Upper forearm (Active)   Site Assessment WDL 10/12/2019  5:45 AM   Line Status Infusing 10/12/2019  5:45 AM   Phlebitis Scale 0-->no symptoms 10/12/2019  5:45 AM   Infiltration Scale 0 10/12/2019  5:45 AM   Number of days: 0        JZG FV AN PLAN NO PONV RULE    neg OB ADAMS Ortiz MD  "

## 2019-10-12 NOTE — PROVIDER NOTIFICATION
10/12/19 1005   Provider Notification   Provider Name/Title janelle colindres cnm   Method of Notification In Department   updated that pt feels rectal pressure.  Able to relax/rest between ctxs.  Updated on epidural/fentanyl.  Last SVE.  Unable to insert straight cath by self and marni Fontenot rn - resistance.  Pt used bedpan to void 50 ml bloody urine.  Will reassess.

## 2019-10-12 NOTE — PLAN OF CARE
Vital signs stable and postpartum checks within normal limits. Pt is up to void with no problem. Pt is breastfeeding well with full assist latching and positioning. Complains of occasional tenderness with latching if baby's lower lip is fold inward. Denies pain. Pt is using tucks for comfort and plans to suck/shower after breastfeeding. Continue cares and assist with breast feeding as needed.

## 2019-10-12 NOTE — PROGRESS NOTES
Summary:  Transferred to [facility] at 1503 via wheelchair holding her  in her arms.  Mikhail present and supportive.  Id bands checked/matched w Prabha Scruggs, Charge RN.  [place] to breast fed.  Bedside report to JUVE Finch.  Summary of labor: at start of shift, pt was very uncomfortable w labor pain.  Requesting an epidural.  Epidural placed by Dr Jennifer MENJIVAR.  Pt remained uncomfortable w pressure.  IV fentanyl given as ordered by FABI Koo CNM and Dr Jennifer ANGELA.  Pt able to rest between ctxs.  SROM 10 clear fluid.  +GBS received PCN.  Afrebile.  Some elevated BPs non severe range reported to ANA Koo.  Labs were WNL.  Oxytocin adjusted during labor.  Cat 1 w Cat 2 prior to delivery.  Periods of mod and min variability after iv fentanyl.  occ variable w immediate return to baseline.  accels present.  130 baseline.   at 1222.  Breast fed w assistance.  QBL at delivery and 2 hrs pp 273.  hilaria Price present in labor.  Up to bathroom for large void prior to transfer - without difficulty.  States understanding of fall's risk.  Comfortable.  Has hemorrhoids.  Call light is within reach.

## 2019-10-12 NOTE — PLAN OF CARE
Pt with srom around 1300 yesterday with clear pink fluid. Pitocin started at 2200 last noc for labor augmentation. Expect Pt to have  today. VSS, Pt active in labor, using nitrous for pain. Now requesting epidural.

## 2019-10-12 NOTE — PROVIDER NOTIFICATION
10/12/19 0843   Provider Notification   Provider Name/Title FABI Koo CNM   Method of Notification Electronic Page   pt remains uncomfortable after epidural.  Some elevated bps.

## 2019-10-12 NOTE — ANESTHESIA PROCEDURE NOTES
Epidural Procedure Note    Staff:     Anesthesiologist:  Nay Ortiz MD  Location: OB   Pre-procedure checklist:   patient identified, IV checked, site marked, risks and benefits discussed, informed consent, monitors and equipment checked, pre-op evaluation, at physician/surgeon's request and post-op pain management      Correct Patient: Yes      Correct Position: Yes      Correct Site: Yes      Correct Procedure: Yes      Correct Laterality:  Yes    Site Marked:  Yes  Procedure:     Procedure:  Epidural catheter    ASA:  2    Position:  Sitting    Sterile Prep: chloraprep      Insertion site:  L2-3    Local skin infiltration:  1% lidocaine    Approach:  Midline    Needle gauge (G):  17    Needle Length (in):  3.5    Block Needle Type:  Touhy    Injection Technique:  LORT saline    CAROLINA at (cm):  6    Attempts:  2    Redirects:  1    Catheter gauge (G):  19    Catheter threaded easily: Yes      Threaded to cm at skin:  10    Paresthesias:  No    Aspiration negative for Heme or CSF: Yes       Local anesthetic:  Lidocaine 1.5% w/ 1:200,000 epinephrine    Test dose time:  07:54    Test dose negative for signs of intravascular, subdural or intrathecal injection: Yes

## 2019-10-12 NOTE — PROGRESS NOTES
Blood pressure 135/83, temperature 97.8  F (36.6  C), temperature source Oral, resp. rate 16, not currently breastfeeding.    General appearance: uncomfortable with contractions, tearful; partner and  bedside  CONTRACTIONS: moderate and every 3-5 minutes  Pitocin- 10 mu/min.,  Antibiotics- PCN  FETAL HEART TONES: continuous EFM- baseline 150 with moderate variability and no accelerations. No decelerations.  ROM: clear fluid  PELVIC EXAM:  6 / 90%/ -1    ASSESSMENT:  ==============  IUP @ 39w5d SROM x 16 hours   Fetal Heart Rate Tracing category one  GBS- positive- adequately treated    PLAN:  ===========  Encouraged to try hydrotherapy or nitrous oxide.  Ambulation, hydration, position changes, birthing ball/sling and tub options to facilitate labor.  Observation and reevaluate in 1-2 hours prn  Continue labor augmentation with Pitocin  Continue prophylactic IV antibiotic PCN for positive GBS status.   Reevaluate in 2-4 hours prn   CECELIA HartmanM

## 2019-10-13 LAB — HGB BLD-MCNC: 12.4 G/DL (ref 11.7–15.7)

## 2019-10-13 PROCEDURE — 85018 HEMOGLOBIN: CPT | Performed by: ADVANCED PRACTICE MIDWIFE

## 2019-10-13 PROCEDURE — 25000132 ZZH RX MED GY IP 250 OP 250 PS 637: Performed by: ADVANCED PRACTICE MIDWIFE

## 2019-10-13 PROCEDURE — 36415 COLL VENOUS BLD VENIPUNCTURE: CPT | Performed by: ADVANCED PRACTICE MIDWIFE

## 2019-10-13 PROCEDURE — 12000001 ZZH R&B MED SURG/OB UMMC

## 2019-10-13 RX ADMIN — ACETAMINOPHEN 650 MG: 325 TABLET, FILM COATED ORAL at 11:06

## 2019-10-13 RX ADMIN — DOCUSATE SODIUM 100 MG: 100 CAPSULE, LIQUID FILLED ORAL at 11:06

## 2019-10-13 RX ADMIN — ACETAMINOPHEN 650 MG: 325 TABLET, FILM COATED ORAL at 16:53

## 2019-10-13 RX ADMIN — DOCUSATE SODIUM 100 MG: 100 CAPSULE, LIQUID FILLED ORAL at 20:38

## 2019-10-13 RX ADMIN — ACETAMINOPHEN 650 MG: 325 TABLET, FILM COATED ORAL at 20:38

## 2019-10-13 RX ADMIN — ACETAMINOPHEN 650 MG: 325 TABLET, FILM COATED ORAL at 05:01

## 2019-10-13 NOTE — PLAN OF CARE
Vital signs and assessment WDL. Pain managed with tylenol. Up independently in room.Positive bonding observed with .

## 2019-10-13 NOTE — PROVIDER NOTIFICATION
10/12/19 9226   Provider Notification   Provider Name/Title Tim   Method of Notification Electronic Page   Request Evaluate-Remote   Notification Reason Medication Request   7123 D.A pt requested to switch senna to [facility] for tomorrow; I accidently paged resident when this is CNM pt she gave ok to switch order and realized after this was CNM patient; just wanted to update you are you ok with this switch:? thank you Marci [id number]

## 2019-10-13 NOTE — PLAN OF CARE
Data: Vital signs within normal limits. Postpartum checks within normal limits - see flow record. Patient eating and drinking normally. Patient able to empty bladder independently and is up ambulating. No apparent signs of infection. perineum  healing well. Patient performing self cares and is able to care for infant. Breastfeeding with full assist.   Action: Patient medicated during the shift for pain and cramping. See MAR. Patient reassessed within 1 hour after each medication and pain was improved - patient stated she was comfortable. Patient education done about breastfeeding and  cares. See flow record.  Response: Positive attachment behaviors observed with infant. Support persons present.   Plan: Anticipate discharge on 10/14.

## 2019-10-14 VITALS
OXYGEN SATURATION: 98 % | TEMPERATURE: 97.6 F | RESPIRATION RATE: 16 BRPM | SYSTOLIC BLOOD PRESSURE: 133 MMHG | HEART RATE: 57 BPM | DIASTOLIC BLOOD PRESSURE: 91 MMHG

## 2019-10-14 PROCEDURE — 25000132 ZZH RX MED GY IP 250 OP 250 PS 637: Performed by: ADVANCED PRACTICE MIDWIFE

## 2019-10-14 RX ADMIN — DOCUSATE SODIUM 100 MG: 100 CAPSULE, LIQUID FILLED ORAL at 09:17

## 2019-10-14 RX ADMIN — ACETAMINOPHEN 650 MG: 325 TABLET, FILM COATED ORAL at 00:42

## 2019-10-14 RX ADMIN — ACETAMINOPHEN 650 MG: 325 TABLET, FILM COATED ORAL at 09:17

## 2019-10-14 RX ADMIN — ACETAMINOPHEN 650 MG: 325 TABLET, FILM COATED ORAL at 05:18

## 2019-10-14 NOTE — PLAN OF CARE
Vital signs and assessment WDL. Pain managed with tylenol. Patient states has discharge medications at home. Discharge instructions reviewed, questions answered. Patient verbalizes understanding.

## 2019-10-14 NOTE — LACTATION NOTE
This note was copied from a baby's chart.  Consult for: First time mom breastfeeding, LGA infant.    History: Vaginal delivery @ 39w5d, LGA infant @ 9# 11.9 oz. birthweight, 3.4% loss at 24 hours, -6.8% from birthweight at 48 hours with high intermediate risk serum bilirubin x2.  Mom is AMA @ 38 y/o, history of infertility with multiple treatment attempts, this pregnancy result of IVF.     Breast exam of mom: Soft, symmetrical with intact but reddened and sore, everted nipples bilaterally. Isiah reports areolar pigment changes & significant bilateral breast growth during pregnancy.      Oral exam of baby:  Moderate arch to palate, good tongue extension and lift, organized suck on finger.     Feeding assessment:  Mom independent in latching but struggle more on left side. Using breast friend pillow, she latched infant on her own on left but shallow, pinching. Demo for mom ways to get deeper & had her return demo with relief getting comfortable latch.  Point out Nutritive suck and swallows that both parents could hear.    Education provided: Discussed positioning & using pillows/blankets for support, anatomy of breast and infant mouth for feedings, tips to get and maintain deep latch, breast compressions to enhance milk transfer, benefits of skin to skin and feeding on cue, supply and demand, risk factors & ways to maximize supply, benefits of and how to do breast massage & hand expression, encouraged hands on pumping when home with their Spectra pump, how to tell when satiated and if getting enough, what to expect in the coming days and preventing engorgement. Reviewed breastfeeding log with when and who to call if concerns and breastfeeding resource list adding in [email or web address] and additional community resources.     Plan: Frequent skin to skin, breastfeed on cue with goal of 8 to 12 feedings in 24 hours, watch for early cues. No longer than 3 hours between feedings, encouraged breast massage and compressions while  feeding. Hand express after each feeding until milk is in and hands on pumping 3-4 times in 24 hours to help maximize milk supply & bring in sooner. Follow up with outpatient lactation @ [facility] within a week of discharge for support with first time breastfeeding & establishing supply for mom with risk factors, above.

## 2019-10-14 NOTE — DISCHARGE SUMMARY
[facility] Discharge Summary    Isiah Reina MRN# [id number]   Age: 39 year old YOB: 1980     Date of Admission:  10/11/2019  Date of Discharge::  10/14/2019  Admitting Physician:  CECELIA Mcmanus CNM  Discharge Physician:  CECELIA Torres CNM, CNM, MS      Home clinic: [facility]          Admission Diagnoses:   Maternity*gary  10/14/19/water broke  Labor and delivery, indication for care   (normal spontaneous vaginal delivery)          Discharge Diagnosis:   Normal spontaneous vaginal delivery  Intrauterine pregnancy at 39 weeks gestation          Procedures:   Procedure(s): Repair of first degree perineal laceration       No other procedures performed during this admission           Medications Prior to Admission:     Medications Prior to Admission   Medication Sig Dispense Refill Last Dose     calcium citrate-vitamin D (CITRACAL) 315-200 MG-UNIT TABS per tablet Take 1 tablet by mouth 2 times daily   Past Week at Unknown time     Docosahexaenoic Acid (DHA NATURAL OMEGA-3 PO) Take 1 tablet by mouth daily   10/11/2019 at Unknown time     docusate sodium (COLACE) 50 MG capsule 1 capsule daily   10/10/2019 at Unknown time     hydrocortisone (ANUSOL-HC) 2.5 % cream Place rectally 2 times daily as needed for hemorrhoids 30 g 0 Past Month at Unknown time     order for DME Maternity Belt order 1 each 0 Past Month at Unknown time     polyethylene glycol (MIRALAX/GLYCOLAX) packet Take 1 packet by mouth daily   Past Month at Unknown time     PRENATAL 27-1 MG TABS Take 1 tablet by mouth daily   10/11/2019 at Unknown time             Discharge Medications:     Current Discharge Medication List      CONTINUE these medications which have NOT CHANGED    Details   calcium citrate-vitamin D (CITRACAL) 315-200 MG-UNIT TABS per tablet Take 1 tablet by mouth 2 times daily      Docosahexaenoic Acid (DHA NATURAL OMEGA-3 PO) Take 1 tablet by mouth daily      docusate  sodium (COLACE) 50 MG capsule 1 capsule daily      hydrocortisone (ANUSOL-HC) 2.5 % cream Place rectally 2 times daily as needed for hemorrhoids  Qty: 30 g, Refills: 0    Associated Diagnoses: External hemorrhoids      order for DME Maternity Belt order  Qty: 1 each, Refills: 0    Associated Diagnoses: Back pain in pregnancy      polyethylene glycol (MIRALAX/GLYCOLAX) packet Take 1 packet by mouth daily      PRENATAL 27-1 MG TABS Take 1 tablet by mouth daily                   Consultations:   No consultations were requested during this admission          Brief History of Labor:   ASSESSMENT & PLAN: DELIVERY NOTE:  Brief Labor Course: Patient presented to labor and delivery on 10/11/19 following spontaneous rupture of membranes, antibiotic prophylaxis initiated for GBS positive status.  Patient desired expectant management, but eventually consented to Pitocin augmentation.  Patient used Nitrous oxide and labor epidural for pain relief.  Epidural placed, but did not provide complete relief.    Delivery Note:   Patient progressed to complete at 1139 and began active pushing efforts when an anterior lip of cervix was easily reduced with one contraction.  Steady progress made with each push.  Infant brought to a crown and delivered MOA, restituted ROT with right (posterior) nuchal arm.  With the next contraction posterior arm delivered and anterior shoulder delivered with ease.  Loose nuchal cord noted x1 not reduced.  IV Pitocin infusing prior to delivery of placenta.  Placenta delivered spontaneously intact with 3 vessel cord, inspected and found to be intact.  Perineum inspected, small right periurethral laceration identified, it was hemostatic and did not require repair.  Shallow first degree laceration noted that required 2 interrupted sutures to achieve hemostasis under local anesthetic using 3-0 Vicryl.  Mother and baby stable in recovery.    IUP at 39 weeks gestation delivered on 2019.      delivery of a viable Female infant.  Weight : 9 pounds 12 ounces   Apgars of 8 at 1 minute and 9 at 5 minutes.  Labor was augmented.  Medications administered  in labor:  Pain Rx Epidural, Fentanyl and Nitrous Oxide; Antibiotics Yes: PCN and IV antibiotics infused greater than 4 hours;   Perineum: 1st degree  Placenta-mechanism: spontaneous, intact,  with a 3 vessel cord. IV oxytocin was given After delivery of baby Before delivery of placenta  Quantitative Blood Loss was 209cc.  Complications of labor and delivery: None  Anticipated Discharge Date: 10/14/2019  Birth attendants: ANA Monteiro APRN CNM     Assessment Day of Discharge      Breasts:soft  Nipples:erect  Fundus:ff umb-1  Abdomen:soft, NT  Lochia:min rubra, no clots  Perineum:well approx  Legs:NT           Hospital Course:   The patient's hospital course was unremarkable.  On discharge, her pain was well controlled. Vaginal bleeding is similar to peak menstrual flow.  Voiding without difficulty.  Ambulating well and tolerating a normal diet.  No fever.  Breastfeeding well.  Infant is stable.  No bowel movement yet.*  She was discharged on post-partum day #2.    Post-partum hemoglobin:   Hemoglobin   Date Value Ref Range Status   10/13/2019 12.4 11.7 - 15.7 g/dL Final      ASSESSMENT/PLAN:  Stable Post-partum day #2  Complications:none  Plan d/c home today  RTC 2 weeks  Teaching done: D/C Instructions: Nutrition/Activity, Engorgement Management, Birth Control Options, Warning Signs/When to Call: Excessive Bleeding, Infection, PP Depression, Kegals and Crunches, RTC Clinic for PP Appointment and PNV    Postpartum warning s/s reviewed, including bleeding/clots, fever, mastitis, or depression      Current Discharge Medication List      CONTINUE these medications which have NOT CHANGED    Details   calcium citrate-vitamin D (CITRACAL) 315-200 MG-UNIT TABS per tablet Take 1 tablet by mouth 2 times daily      Docosahexaenoic Acid (DHA  NATURAL OMEGA-3 PO) Take 1 tablet by mouth daily      docusate sodium (COLACE) 50 MG capsule 1 capsule daily      hydrocortisone (ANUSOL-HC) 2.5 % cream Place rectally 2 times daily as needed for hemorrhoids  Qty: 30 g, Refills: 0    Associated Diagnoses: External hemorrhoids      order for DME Maternity Belt order  Qty: 1 each, Refills: 0    Associated Diagnoses: Back pain in pregnancy      polyethylene glycol (MIRALAX/GLYCOLAX) packet Take 1 packet by mouth daily      PRENATAL 27-1 MG TABS Take 1 tablet by mouth daily                  Discharge Instructions and Follow-Up:   Discharge diet: Regular   Discharge activity: Activity as tolerated   Discharge follow-up: Follow up with cnm in 2 weeks   Wound care: Drink plenty of fluids           Discharge Disposition:   Discharged to home    discussed concerns re: labor support, RN communication and epidural function, C. Barnard to see patient prior to d/c    CECELIA Torres CNM

## 2019-10-14 NOTE — PLAN OF CARE
Data: Vital signs within normal limits. Postpartum checks within normal limits - see flow record. Patient eating and drinking normally. Patient able to empty bladder independently and is up ambulating. No apparent signs of infection. Laceration healing well. Patient performing self cares and is able to care for infant.   Action: Patient medicated with acetaminophen during the shift for pain. See MAR. Patient stated she was comfortable. Patient c/o most pain coming from hemorrhoids.   Response: Positive attachment behaviors observed with infant. Support person,  Mikhail, present.   Plan: Continue with plan of care.

## 2019-10-14 NOTE — PLAN OF CARE
Data: Vital signs within normal limits. Postpartum checks within normal limits - see flow record. Patient eating and drinking normally. Patient able to empty bladder independently and is up ambulating, patient ambulated in the hallway this evening. No apparent signs of infection. Patient performing self cares and is able to care for infant. Breastfeeding on demand with assistance. Assisting patient with hand expression.   Action: Pain has been adequately managed with oral medications.   Response: Positive attachment behaviors observed with infant. Support person, : Mikhail present.   Plan: Continue with the plan of care.

## 2019-10-25 ENCOUNTER — OFFICE VISIT (OUTPATIENT)
Dept: OBGYN | Facility: CLINIC | Age: 39
End: 2019-10-25
Attending: ADVANCED PRACTICE MIDWIFE
Payer: COMMERCIAL

## 2019-10-25 VITALS
BODY MASS INDEX: 29.01 KG/M2 | HEIGHT: 64 IN | HEART RATE: 60 BPM | DIASTOLIC BLOOD PRESSURE: 87 MMHG | WEIGHT: 169.9 LBS | SYSTOLIC BLOOD PRESSURE: 122 MMHG

## 2019-10-25 DIAGNOSIS — G56.03 BILATERAL CARPAL TUNNEL SYNDROME: ICD-10-CM

## 2019-10-25 PROCEDURE — G0463 HOSPITAL OUTPT CLINIC VISIT: HCPCS | Mod: ZF

## 2019-10-25 ASSESSMENT — ANXIETY QUESTIONNAIRES
3. WORRYING TOO MUCH ABOUT DIFFERENT THINGS: NOT AT ALL
6. BECOMING EASILY ANNOYED OR IRRITABLE: SEVERAL DAYS
1. FEELING NERVOUS, ANXIOUS, OR ON EDGE: SEVERAL DAYS
7. FEELING AFRAID AS IF SOMETHING AWFUL MIGHT HAPPEN: NOT AT ALL
GAD7 TOTAL SCORE: 3
5. BEING SO RESTLESS THAT IT IS HARD TO SIT STILL: NOT AT ALL
2. NOT BEING ABLE TO STOP OR CONTROL WORRYING: NOT AT ALL

## 2019-10-25 ASSESSMENT — PATIENT HEALTH QUESTIONNAIRE - PHQ9: 5. POOR APPETITE OR OVEREATING: SEVERAL DAYS

## 2019-10-25 ASSESSMENT — MIFFLIN-ST. JEOR: SCORE: 1430.66

## 2019-10-25 NOTE — LETTER
"10/25/2019       RE: Isiah Reina   [facility] [id number]     Dear Colleague,    Thank you for referring your patient, Isiah Reina, to the [facility] at [facility]. Please see a copy of my visit note below.    S: Isiah Reina is a  39 year old  s/p  on 1 . C/B IVF pregnancy and GBS+.  Small right periurethral laceration not requiring repair and shallow 1st degree laceration repaired with 2 interrupted sutures. QBL  209ml . Baby girl, Apgars  8, 9. Birth weight  9lb  11.9oz.      Pt reports she is feeling well overall.  Reports a small amount of irregular vaginal bleeding. Reports repair is healing well. Denies perineal or vaginal pain or irritation. Noticing some loss of bladder control c/w urge incontinence.      Baby girl, \"Abdiel Reina Dreyer,\" is healthy and gaining weight. Working on breastfeeding. Pt notes that baby had high bilirubin after birth and there were feeding concerns. She started pumping and feeding via syringe- helped baby gain a little weight but was not considered adequate. Then switched to bottle with pumped breast milk. Now notes baby won't latch and only wants the bottle. Had 2 week peds check today- almost to birth weight. Peds recommended now nursing on demand. Working with lactation consultant via peds clinic. Also had a home visit.  Now that weight is improving, going to work more on latching. Next appointment on Wednesday.       Reports mood is currently stable.  Feels well supported by spouse, family and friends. Dad going back to work on Monday. Pt has 12 weeks of leave. Denies SI/HI or thoughts of self harm.      Reports that her bilateral carpal tunnel from pregnancy is still present and sometimes feels like it is getting worse. Has been unable to wear the braces as it limits her ROM with caring for baby. Interested in physical therapy referral.     Not interested in " "contraception. Previous IVF pregnancy.     Last pap 11/11/2016 NIL, negative HPV. No history of abnormal.     O: /87 (BP Location: Left arm, Patient Position: Chair)   Pulse 60   Ht 1.626 m (5' 4\")   Wt 77.1 kg (169 lb 14.4 oz)   BMI 29.16 kg/m      Constitutional:    Alert and oriented, well developed, in no acute distress.      PHQ9= 4, GAD7= 3    Exam:  Uterus with appropriate involution.     A: (Z39.2) Routine postpartum follow-up  (primary encounter diagnosis)    (G56.03) Bilateral carpal tunnel syndrome  Plan: PHYSICAL THERAPY REFERRAL    P:  - Physical therapy referral placed for carpal tunnel and possible dequervian's tenosynovitis.  - Reviewed kegel exercises. Discussed referral to Dr. Salas, uro-gyn, at 6 weeks postpartum if no improvement.  - Discussed contraception options.   - Next pap with HPV cotesting due 11/2021.    RTC in 4 weeks for routine 6 week postpartum visit.    CECELIA Quiros, CATARINAM       "

## 2019-10-25 NOTE — PROGRESS NOTES
"S: Isiah Reina is a 39 year old  s/p  on 10/12/2019. C/B IVF pregnancy and GBS+. Small right periurethral laceration not requiring repair and shallow 1st degree laceration repaired with 2 interrupted sutures. QBL 209ml . Baby girl, Apgars 8, 9. Birth weight 9lb 11.9oz.      Pt reports she is feeling well overall.  Reports a small amount of irregular vaginal bleeding. Reports repair is healing well. Denies perineal or vaginal pain or irritation. Noticing some loss of bladder control c/w urge incontinence.      Baby girl, \"Abdiel Reina Dreyer,\" is healthy and gaining weight. Working on breastfeeding. Pt notes that baby had high bilirubin after birth and there were feeding concerns. She started pumping and feeding via syringe- helped baby gain a little weight but was not considered adequate. Then switched to bottle with pumped breast milk. Now notes baby won't latch and only wants the bottle. Had 2 week peds check today- almost to birth weight. Peds recommended now nursing on demand. Working with lactation consultant via peds clinic. Also had a home visit.  Now that weight is improving, going to work more on latching. Next appointment on Wednesday.       Reports mood is currently stable.  Feels well supported by spouse, family and friends. Dad going back to work on Monday. Pt has 12 weeks of leave. Denies SI/HI or thoughts of self harm.      Reports that her bilateral carpal tunnel from pregnancy is still present and sometimes feels like it is getting worse. Has been unable to wear the braces as it limits her ROM with caring for baby. Interested in physical therapy referral.     Not interested in contraception. Previous IVF pregnancy.     Last pap 2016 NIL, negative HPV. No history of abnormal.     O: /87 (BP Location: Left arm, Patient Position: Chair)   Pulse 60   Ht 1.626 m (5' 4\")   Wt 77.1 kg (169 lb 14.4 oz)   BMI 29.16 kg/m     Constitutional:    Alert and oriented, well " developed, in no acute distress.      PHQ9= 4, GAD7= 3    Exam:  Uterus with appropriate involution.     A: (Z39.2) Routine postpartum follow-up  (primary encounter diagnosis)    (G56.03) Bilateral carpal tunnel syndrome  Plan: PHYSICAL THERAPY REFERRAL    P:  - Physical therapy referral placed for carpal tunnel and possible dequervian's tenosynovitis.  - Reviewed kegel exercises. Discussed referral to Dr. Salas, uro-gyn, at 6 weeks postpartum if no improvement.  - Discussed contraception options.   - Next pap with HPV cotesting due 11/2021.    RTC in 4 weeks for routine 6 week postpartum visit.    CECELIA Quiros, CNM

## 2019-10-26 ASSESSMENT — ANXIETY QUESTIONNAIRES: GAD7 TOTAL SCORE: 3

## 2019-10-30 ENCOUNTER — THERAPY VISIT (OUTPATIENT)
Dept: OCCUPATIONAL THERAPY | Facility: CLINIC | Age: 39
End: 2019-10-30
Attending: ADVANCED PRACTICE MIDWIFE
Payer: COMMERCIAL

## 2019-10-30 DIAGNOSIS — G56.03 BILATERAL CARPAL TUNNEL SYNDROME: ICD-10-CM

## 2019-10-30 DIAGNOSIS — M25.531 PAIN IN BOTH WRISTS: ICD-10-CM

## 2019-10-30 DIAGNOSIS — M25.532 PAIN IN BOTH WRISTS: ICD-10-CM

## 2019-10-30 PROCEDURE — 97165 OT EVAL LOW COMPLEX 30 MIN: CPT | Mod: GO | Performed by: OCCUPATIONAL THERAPIST

## 2019-10-30 PROCEDURE — 97110 THERAPEUTIC EXERCISES: CPT | Mod: GO | Performed by: OCCUPATIONAL THERAPIST

## 2019-10-30 NOTE — PROGRESS NOTES
[facility] Initial Evaluation     Current Date: 10/30/2019    Diagnosis: B CTS   DOI: 9/27/19    Subjective:    Isiah Reina being seen for Pregnancy-related hand numbness, tingling and stiff joints.   Problem began 9/27/2019.   General health as reported by patient is good. Pertinent medical history includes:  None.  Medical allergies: other. Other medical allergies details: Ibuprofen.  Surgeries include:  None.  Current medications:  None.   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  Pain is described as aching and cramping  Pain is worse in the A.M.. Since onset symptoms are gradually worsening.      Patient is [employer]. Restrictions include:  Working in normal job without restrictions.      Red flags:  Bilateral numbness and pain at rest/night.    Hand dominance: R    Objective:  Sensation:  Paresthesias are worse on L side. Paresthesias present through thumb, IF, MF, and RF (radial aspect) bilaterally. Tingling is present from PIP joint to finger tips constantly, but intensity changes based on positioning.     Pain Level (Scale 0-10):   10/30/2019   At Rest 0-1/10   With Use 4/10     Pain Description:  Date 10/30/2019   Location B Extensor wads,    Pain Quality Aching and sore   Frequency constant     Pain is worst daytime, nighttime, difficulty finding comfortable sleeping position   Exacerbated by Bending the wrists, holding and caring for baby   Relieved by splints   Progression Unchanged     Range of Motion Wrist AROM (PROM):  Date 10/30/2019 10/30/2019   Side R L   Ext 65 66   Flex 56 75     Edema: Pt notes difficulty and pain with making fist. Able to make loose full fist.    Special Tests:  Date 10/30/2019 10/30/2019   Side R L   Phalens + 15 sec + 20 sec   Tinels at CT + +   Tinels at Pronator - -   Median Nerve ULTT NT NT   Paresthesias + +   Elbow flexion test + increase numbness at 20 sec + increase numbness at 20 sec     Tenderness (pain 0-10) 10/30/2019   R LEP 3/10    L LEP 0/10   R MEP 0/10   L MEP 1/10     Resisted Testing: MMT on scale 0-5/5, Pain level report on scale 0-10/10  Date 10/30/2019 10/30/2019   Side R L   Wrist Ext 5/5, 0/10 5/5, 0/10   Wrist Flex 5/5, 0/10 5/5, 0/10   Wrist ext with elbow ext 5/5, 0/10 5/5, 0/10       STRENGTH: (Measured in pounds, pain scale 0-10/10)    Date 10/30/2019        Trials R L R L R L R L R L R L   1 15 7             2               3               Avg               Pain 3 3               3 Point Pinch  Date 10/30/2019        Trials R L R L R L R L R L R L   1 6 7             2               3               Avg               Pain 3 3               Lateral Pinch  Date 10/30/2019        Trials R L R L R L R L R L R L   1               2               3               Avg               Pain                 Assessment:  Patient presents with symptoms consistent with diagnosis B Carpal Tunnel Syndrome,  with conservative intervention.     Patient's limitations or Problem List includes:  Pain, Parathesias of the median nerve distribution, Decreased ROM/motion, Weakness, Decreased  and Tightness in musculature of the bilateral elbow, wrist, hand, thumb, index finger, long finger and ring finger which interferes with the patient's ability to perform Self Care Tasks (dressing, eating, bathing), Sleep Patterns, Recreational Activities and Household Chores as compared to previous level of function.    Rehab Potential:  Excellent - Return to full activity, no limitations    Patient will benefit from skilled Occupational Therapy to increase ROM, flexibility,  strength and forearm strength and decrease pain and parathesias to return to previous activity level and resume normal daily tasks and to reach their rehab potential.    Barriers to Learning:  No barrier    Communication Issues:  Patient appears to be able to clearly communicate and understand verbal and written communication and follow directions correctly.    Assessment of  Occupational Performance:  3-5 Performance Deficits  Identified Performance Deficits: bathing/showering, dressing, care of others, child rearing, health management and maintenance, home establishment and management, meal preparation and cleanup, sleep and leisure activities      Clinical Decision Making (Complexity): Low complexity    Treatment Explanation:  The following has been discussed with the patient:    RX ordered/plan of care  Anticipated outcomes  Possible risks and side effects    P: Frequency:  1 X week, once daily (tapering to every other week)  Duration:  for 6 weeks    Treatment Plan:    Modalities:  US   Therapeutic Exercise: Tendon Gliding ex, blocking  Neuromuscular Techniques: Median nerve glides both active and passive (in the clinic)  Manual Techniques:, Myofascial release of the forearm extensors and flexors, wrist mobilization techniques  Orthosis:  Wrist in netural orthosis for night wear.    Home Program:   Exercise: AROM of fingers and wrist using Tendon gliding techniques and blocking, Median nerve glides.   Pec stretch at wall, and with foam roller  Forearm stretch  Massage to forearm  Trial of edema glove at night    Orthosis: Static orthosis Wrist in neutral orthosis at night.    Activity: Avoid activities that exacerbate symptoms in the median nerve.  Avoid prolonged flexion or extension of the wrist.    Discharge Plan:    Achieve all LTG.  Independent in home treatment program.  Reach maximal therapeutic benefit.    Next Visit:  [facility]  Progress exercise as appropriate

## 2019-11-06 ENCOUNTER — THERAPY VISIT (OUTPATIENT)
Dept: OCCUPATIONAL THERAPY | Facility: CLINIC | Age: 39
End: 2019-11-06
Payer: COMMERCIAL

## 2019-11-06 DIAGNOSIS — M25.532 PAIN IN BOTH WRISTS: ICD-10-CM

## 2019-11-06 DIAGNOSIS — M25.531 PAIN IN BOTH WRISTS: ICD-10-CM

## 2019-11-06 PROCEDURE — 97760 ORTHOTIC MGMT&TRAING 1ST ENC: CPT | Mod: GO | Performed by: OCCUPATIONAL THERAPIST

## 2019-11-06 PROCEDURE — 97140 MANUAL THERAPY 1/> REGIONS: CPT | Mod: GO | Performed by: OCCUPATIONAL THERAPIST

## 2019-11-13 ENCOUNTER — THERAPY VISIT (OUTPATIENT)
Dept: OCCUPATIONAL THERAPY | Facility: CLINIC | Age: 39
End: 2019-11-13
Payer: COMMERCIAL

## 2019-11-13 DIAGNOSIS — M25.532 PAIN IN BOTH WRISTS: ICD-10-CM

## 2019-11-13 DIAGNOSIS — M25.531 PAIN IN BOTH WRISTS: ICD-10-CM

## 2019-11-13 PROCEDURE — 97760 ORTHOTIC MGMT&TRAING 1ST ENC: CPT | Mod: GO | Performed by: OCCUPATIONAL THERAPIST

## 2019-11-13 PROCEDURE — 97140 MANUAL THERAPY 1/> REGIONS: CPT | Mod: GO | Performed by: OCCUPATIONAL THERAPIST

## 2019-11-20 ENCOUNTER — OFFICE VISIT (OUTPATIENT)
Dept: OBGYN | Facility: CLINIC | Age: 39
End: 2019-11-20
Attending: ADVANCED PRACTICE MIDWIFE
Payer: COMMERCIAL

## 2019-11-20 VITALS
HEIGHT: 64 IN | DIASTOLIC BLOOD PRESSURE: 83 MMHG | SYSTOLIC BLOOD PRESSURE: 119 MMHG | BODY MASS INDEX: 28.8 KG/M2 | HEART RATE: 67 BPM | WEIGHT: 168.7 LBS

## 2019-11-20 DIAGNOSIS — R39.15 URINARY URGENCY: ICD-10-CM

## 2019-11-20 ASSESSMENT — ANXIETY QUESTIONNAIRES
5. BEING SO RESTLESS THAT IT IS HARD TO SIT STILL: NOT AT ALL
1. FEELING NERVOUS, ANXIOUS, OR ON EDGE: NOT AT ALL
7. FEELING AFRAID AS IF SOMETHING AWFUL MIGHT HAPPEN: NOT AT ALL
3. WORRYING TOO MUCH ABOUT DIFFERENT THINGS: NOT AT ALL
2. NOT BEING ABLE TO STOP OR CONTROL WORRYING: NOT AT ALL
GAD7 TOTAL SCORE: 1
6. BECOMING EASILY ANNOYED OR IRRITABLE: SEVERAL DAYS

## 2019-11-20 ASSESSMENT — MIFFLIN-ST. JEOR: SCORE: 1425.22

## 2019-11-20 ASSESSMENT — PATIENT HEALTH QUESTIONNAIRE - PHQ9: 5. POOR APPETITE OR OVEREATING: NOT AT ALL

## 2019-11-20 NOTE — NURSING NOTE
SUBJECTIVE:   Isiah Reina is here for her 6-week postpartum checkup.     PHQ-9 score: 3  Hx of Abuse:  No    Delivery Date: 10/12/2019.    Delivering provider:  Annika Koo CNM.    Type of delivery:  .  Perineum:  tear, with repair.     Delivery complications: None  Infant gender:  girl, weight 9 pounds 11.9 oz.  Feeding Method:  .  Complications reported with feeding:  none, infant thriving .    Bleeding:  Light.  Duration:  6weeks.  Menses resumed:  No  Bowel/Urinary problems:  Yes     Contraception Planned:  None -- is she planning pregnancy? no  She  has not had intercourse since delivery..

## 2019-11-20 NOTE — LETTER
2019       RE: Isiah Reina   [facility] [address]     Dear Colleague,    Thank you for referring your patient, Isiah Reina, to the [facility] at [facility]. Please see a copy of my visit note below.    Nursing Notes:   Onelia Reyes CMA  2019 10:15 AM  Signed  SUBJECTIVE:   Isiah Reina is here for her 6-week postpartum checkup.     PHQ-9 score: 3  Hx of Abuse:  No    Delivery Date: 10/12/2019.    Delivering provider:  Annika Koo CNM.    Type of delivery:  .  Perineum:  tear, with repair.     Delivery complications: None  Infant gender:  girl, weight 9 pounds 11.9 oz.  Feeding Method:  .  Complications reported with feeding:  none, infant thriving .    Bleeding:  Light.  Duration:  6weeks.  Menses resumed:  No  Bowel/Urinary problems:  Yes     Contraception Planned:  None -- is she planning pregnancy? no  She  has not had intercourse since delivery.  ================================================================  ROS: 10 point ROS neg other than the symptoms noted above in the HPI.     - was pumping every 2 hours.  Last 10 days nt pumping after now - waiting for her to wake up to feed.  Will start emptying one breast not using   - nipples sore/cracked. Ointment she doesn't like.    - some spots hard.      - no pain or cramping anymore.  - hemorrhoid was an issues but better last few weeks  - can't hold bladder still. First few weeks no control. Slightly better but still an urgency. Kegels for a day then .    - Carpal tunnel better s/p PT 3 weeks.  Custom braces L then R - helping at night.     - Bleeding stopped 2-3 weeks ago then restarted - more than a week now.Periods usually a few days, not too heavy.    - constipation better. Not taking stool softener anymore.  Drinking good water.      - history of IVF. Thinks this might be it in terms of baby.  But have a frozen embryo so they have big  "discussions left  - No sex yet. Uses lubricant usually    - spoke with management about birth experience - feels like she's ok with everything.     EXAM:  /83 (BP Location: Left arm, Patient Position: Chair)   Pulse 67   Ht 1.626 m (5' 4\")   Wt 76.5 kg (168 lb 11.2 oz)   BMI 28.96 kg/m       General: healthy, alert, no distress, cooperative and smiling  Psych: negative for negative for sleep disturbance, anxiety, depression, thoughts of self-harm and thoughts of hurting someone else  Last PHQ-9 score on record= 4  Breasts:  Lactating, Nipples intact with no lesions, Non-tender and No S/S of yeast or mastitis  Abdomen: Benign, Soft, flat, non-tender, No masses, organomegaly and Diastasis less than 1-2 FB  Incision:  None   Vulva:  Normal genitalia and Bartholin's, Urethra, [place]'s normal  Vagina:  normal with good muscle tone, without discharge and epis/repair well healed  Cervix:  Multiparous,, no lesions and pink, moist, closed, without lesion or CMT.    Uterus:  fully involuted and non-tender    Adnexa:  Within normal limits and No masses, nodularity, tenderness  Recto-vaginal:   anus normal    Lab Results   Component Value Date    PAP NIL 2016     HPV negative 2016    ASSESSMENT:   Encounter Diagnoses   Name Primary?     Routine postpartum follow-up      Postpartum care and examination of lactating mother      Urinary urgency Yes      Normal postpartum exam after   Pregnancy was complicated by:  none.      PLAN:  (R39.15) Urinary urgency  (primary encounter diagnosis)  Plan: [facility] PT, HAND, AND CHIROPRACTIC REFERRAL    (Z39.2) Routine postpartum follow-up    (Z39.1) Postpartum care and examination of lactating mother    Orders Placed This Encounter   Procedures     [facility] PT, HAND, AND CHIROPRACTIC REFERRAL      Risks and benefits of prescribed medications discussed.  Medication instructions reviewed.  Contraception methods discussed - reviewed even with history of fertility struggles highest " chance of  pregnancy at this point d/t recent delivery. Plans condoms prn.   Signs and symptoms of postpartum depression/anxiety discussed and resources offered  Discussed calcium intake, vitamins and supplements including Vitamin D  Exercise encouraged  Kegel exercises recommended  Low-fat, low-cholesterol diet discussed  Routine breast self-exam encouraged  Pap with HPV co testing due 2021    Follow up in 1 year and prn     CECELIA BertrandM

## 2019-11-20 NOTE — PROGRESS NOTES
"  Nursing Notes:   Onelia Reyes CMA  2019 10:15 AM  Signed  SUBJECTIVE:   Isiah Reina is here for her 6-week postpartum checkup.     PHQ-9 score: 3  Hx of Abuse:  No    Delivery Date: 10/12/2019.    Delivering provider:  Annika Koo CNM.    Type of delivery:  .  Perineum:  tear, with repair.     Delivery complications: None  Infant gender:  girl, weight 9 pounds 11.9 oz.  Feeding Method:  .  Complications reported with feeding:  none, infant thriving .    Bleeding:  Light.  Duration:  6weeks.  Menses resumed:  No  Bowel/Urinary problems:  Yes     Contraception Planned:  None -- is she planning pregnancy? no  She  has not had intercourse since delivery..         ================================================================  ROS: 10 point ROS neg other than the symptoms noted above in the HPI.       - was pumping every 2 hours.  Last 10 days nt pumping after now - waiting for her to wake up to feed.  Will start emptying one breast not using   - nipples sore/cracked. Ointment she doesn't like.    - some spots hard.      - no pain or cramping anymore.  - hemorrhoid was an issues but better last few weeks  - can't hold bladder still. First few weeks no control. Slightly better but still an urgency. Kegels for a day then .    - Carpal tunnel better s/p PT 3 weeks.  Custom braces L then R - helping at night.     - Bleeding stopped 2-3 weeks ago then restarted - more than a week now.Periods usually a few days, not too heavy.    - constipation better. Not taking stool softener anymore.  Drinking good water.      - history of IVF. Thinks this might be it in terms of baby.  But have a frozen embryo so they have big discussions left  - No sex yet. Uses lubricant usually    - spoke with management about birth experience - feels like she's ok with everything.     EXAM:  /83 (BP Location: Left arm, Patient Position: Chair)   Pulse 67   Ht 1.626 m (5' 4\")   Wt 76.5 kg (168 lb 11.2 oz)   " BMI 28.96 kg/m      General: healthy, alert, no distress, cooperative and smiling  Psych: negative for negative for sleep disturbance, anxiety, depression, thoughts of self-harm and thoughts of hurting someone else  Last PHQ-9 score on record= 4  Breasts:  Lactating, Nipples intact with no lesions, Non-tender and No S/S of yeast or mastitis  Abdomen: Benign, Soft, flat, non-tender, No masses, organomegaly and Diastasis less than 1-2 FB  Incision:  None   Vulva:  Normal genitalia and Bartholin's, Urethra, [place]'s normal  Vagina:  normal with good muscle tone, without discharge and epis/repair well healed  Cervix:  Multiparous,, no lesions and pink, moist, closed, without lesion or CMT.    Uterus:  fully involuted and non-tender    Adnexa:  Within normal limits and No masses, nodularity, tenderness  Recto-vaginal:   anus normal    Lab Results   Component Value Date    PAP NIL 2016     HPV negative 2016      ASSESSMENT:   Encounter Diagnoses   Name Primary?     Routine postpartum follow-up      Postpartum care and examination of lactating mother      Urinary urgency Yes      Normal postpartum exam after   Pregnancy was complicated by:  none.      PLAN:  (R39.15) Urinary urgency  (primary encounter diagnosis)  Plan: [facility] PT, HAND, AND CHIROPRACTIC REFERRAL      (Z39.2) Routine postpartum follow-up    (Z39.1) Postpartum care and examination of lactating mother        Orders Placed This Encounter   Procedures     [facility] PT, HAND, AND CHIROPRACTIC REFERRAL           Risks and benefits of prescribed medications discussed.  Medication instructions reviewed.  Contraception methods discussed - reviewed even with history of fertility struggles highest chance of  pregnancy at this point d/t recent delivery. Plans condoms prn.   Signs and symptoms of postpartum depression/anxiety discussed and resources offered  Discussed calcium intake, vitamins and supplements including Vitamin D  Exercise encouraged  Kegel exercises  recommended  Low-fat, low-cholesterol diet discussed  Routine breast self-exam encouraged  Pap with HPV co testing due 2021      Follow up in 1 year and prn

## 2019-11-21 ASSESSMENT — ANXIETY QUESTIONNAIRES: GAD7 TOTAL SCORE: 1

## 2019-12-10 ENCOUNTER — MYC MEDICAL ADVICE (OUTPATIENT)
Dept: OBGYN | Facility: CLINIC | Age: 39
End: 2019-12-10

## 2019-12-12 NOTE — TELEPHONE ENCOUNTER
Physical therapy referral faxed to [facility] at [phone] at patient request. Notified via [employer].

## 2020-01-27 PROBLEM — M25.532 PAIN IN BOTH WRISTS: Status: RESOLVED | Noted: 2019-10-30 | Resolved: 2020-01-27

## 2020-01-27 PROBLEM — M25.531 PAIN IN BOTH WRISTS: Status: RESOLVED | Noted: 2019-10-30 | Resolved: 2020-01-27

## 2020-02-21 ENCOUNTER — MEDICAL CORRESPONDENCE (OUTPATIENT)
Dept: HEALTH INFORMATION MANAGEMENT | Facility: CLINIC | Age: 40
End: 2020-02-21

## 2020-03-10 ENCOUNTER — HEALTH MAINTENANCE LETTER (OUTPATIENT)
Age: 40
End: 2020-03-10

## 2020-04-17 ENCOUNTER — MEDICAL CORRESPONDENCE (OUTPATIENT)
Dept: HEALTH INFORMATION MANAGEMENT | Facility: CLINIC | Age: 40
End: 2020-04-17

## 2020-09-10 ENCOUNTER — TELEPHONE (OUTPATIENT)
Dept: OBGYN | Facility: CLINIC | Age: 40
End: 2020-09-10

## 2020-10-07 ENCOUNTER — ALLIED HEALTH/NURSE VISIT (OUTPATIENT)
Dept: FAMILY MEDICINE | Facility: CLINIC | Age: 40
End: 2020-10-07
Payer: COMMERCIAL

## 2020-10-07 DIAGNOSIS — Z23 NEED FOR PROPHYLACTIC VACCINATION AND INOCULATION AGAINST INFLUENZA: Primary | ICD-10-CM

## 2020-10-07 NOTE — PROGRESS NOTES
"Injectable Influenza Immunization Documentation    1.  Has the patient received the information for the injectable influenza vaccine? YES     2. Is the patient 6 months of age or older? YES     3. Does the patient have any of the following contraindications?         Severe allergy to eggs? No     Severe allergic reaction to previous influenza vaccines? No   Severe allergy to latex? No       History of Guillain-[place] syndrome? No     Currently have a temperature greater than 100.4F? No        4.  Severely egg allergic patients should have flu vaccine eligibility assessed by an MD, RN, or pharmacist, and those who received flu vaccine should be observed for 15 min by an MD, RN, Pharmacist, Medical Technician, or member of clinic staff.\": YES    5. Latex-allergic patients should be given latex-free influenza vaccine Yes. Please reference the Vaccine latex table to determine if your clinic s product is latex-containing.       Vaccination given by Mela Lambert CMA,[facility]  October 7, 2020 10:18 AM            Isiah Reina comes into clinic today at the request of Dr. Ferrer Ordering Provider for Flu shot.      This service provided today was under the supervising provider of the day Dr. Ferrer, who was available if needed.    Mela Lambert CMA          "

## 2021-01-15 NOTE — TELEPHONE ENCOUNTER
Faxed PT referral to Yoni BURNETT at [phone].    [facility]. Will work in Monday.

## 2021-03-10 ENCOUNTER — APPOINTMENT (OUTPATIENT)
Dept: URGENT CARE | Facility: CLINIC | Age: 41
End: 2021-03-10
Payer: COMMERCIAL

## 2021-04-24 ENCOUNTER — HEALTH MAINTENANCE LETTER (OUTPATIENT)
Age: 41
End: 2021-04-24

## 2021-05-03 ENCOUNTER — MYC MEDICAL ADVICE (OUTPATIENT)
Dept: FAMILY MEDICINE | Facility: CLINIC | Age: 41
End: 2021-05-03

## 2021-05-04 ENCOUNTER — VIRTUAL VISIT (OUTPATIENT)
Dept: FAMILY MEDICINE | Facility: CLINIC | Age: 41
End: 2021-05-04
Payer: COMMERCIAL

## 2021-05-04 DIAGNOSIS — J30.1 SEASONAL ALLERGIC RHINITIS DUE TO POLLEN: Primary | ICD-10-CM

## 2021-05-04 PROBLEM — Z34.02 SUPERVISION OF NORMAL FIRST PREGNANCY IN SECOND TRIMESTER: Status: RESOLVED | Noted: 2019-09-24 | Resolved: 2021-05-04

## 2021-05-04 PROBLEM — O99.810 ABNORMAL GLUCOSE IN PREGNANCY, ANTEPARTUM: Status: RESOLVED | Noted: 2019-07-09 | Resolved: 2021-05-04

## 2021-05-04 PROBLEM — K64.4 EXTERNAL HEMORRHOIDS: Status: RESOLVED | Noted: 2019-06-25 | Resolved: 2021-05-04

## 2021-05-04 PROCEDURE — 99203 OFFICE O/P NEW LOW 30 MIN: CPT | Mod: 95 | Performed by: FAMILY MEDICINE

## 2021-05-04 RX ORDER — FLUTICASONE PROPIONATE 50 MCG
SPRAY, SUSPENSION (ML) NASAL
COMMUNITY
Start: 2021-05-01

## 2021-05-04 RX ORDER — FEXOFENADINE HCL 180 MG/1
TABLET ORAL
COMMUNITY
Start: 2021-04-25 | End: 2023-10-10

## 2021-05-04 RX ORDER — MONTELUKAST SODIUM 10 MG/1
10 TABLET ORAL AT BEDTIME
Qty: 90 TABLET | Refills: 3 | Status: SHIPPED | OUTPATIENT
Start: 2021-05-04 | End: 2022-03-31

## 2021-05-04 RX ORDER — MONTELUKAST SODIUM 10 MG/1
TABLET ORAL
COMMUNITY
Start: 2021-04-25 | End: 2021-05-04

## 2021-05-04 NOTE — PROGRESS NOTES
Isiah is a 40 year old who is being evaluated via a billable telephone visit.      What phone number would you like to be contacted at? [phone] (M)   How would you like to obtain your AVS? MyChart    Assessment & Plan     Seasonal allergic rhinitis due to pollen  Year round, but acute worsening recently, and seems to be gradually worsening over the past few years.  Refilled Singulair, referred to Allergist to potentially consider allergy shots.  - montelukast (SINGULAIR) 10 MG tablet; Take 1 tablet (10 mg) by mouth At Bedtime  - ALLERGY/ASTHMA ADULT REFERRAL    Return in about 4 weeks (around 6/1/2021) for Allergist follow up.    Eloise Lowery MD  [facility]    Sheldon Joyce is a 40 year old who presents for the following health issues     HPI     Allergies  Onset/Duration: a few weeks ago but worsened on Friday , 4 days ago, worse since last night, describes severe sinus pressure right now.  She hasn't found neti pot very effective, tried several years ago. She uses pseudoephedrine rarely, keeps her awake.  She's has year round allergies for many years.  She's gotten sinus infections in the past due to nasal congestion.  She denies tooth pain, fevers  Symptoms:   Nasal congestion: YES  Sneezing: YES  Red, itchy eyes: YES  Progression of Symptoms: about the same   Accompanying Signs & Symptoms:  Cough: no  Wheezing: no  Rash: no  Sinus/facial pain: YES  History:   Is it seasonal: during any time of the year   History of Asthma: no  Has allergy testing been done: not recently but world like referral   Precipitating factors:   None  Alleviating factors:  None  Therapies tried and outcome: otc allergy medication with no relief , Allegra, Flonase, OVER THE COUNTER eye drops (ketotifen), Singulair daily      Review of Systems   Constitutional, HEENT, cardiovascular, pulmonary, GI, , musculoskeletal, neuro, skin, endocrine and psych systems are negative, except as otherwise  noted.      Objective           Vitals:  No vitals were obtained today due to virtual visit.    Physical Exam   healthy, alert, no distress and sounds congested, able to complete sentences.  PSYCH: Alert and oriented times 3; coherent speech, normal   rate and volume, able to articulate logical thoughts, able   to abstract reason, no tangential thoughts, no hallucinations   or delusions  Her affect is normal  RESP: No cough, no audible wheezing, able to talk in full sentences  Remainder of exam unable to be completed due to telephone visits                Phone call duration: 18 minutes

## 2021-05-04 NOTE — PATIENT INSTRUCTIONS
Sinus Rinse/Neti Pot Instructions  1. Time Required: 3 to 5 minutes twice daily.  2. Fill the neti pot with water. The water should be lukewarm (not too hot, not too cold) and can be poured into the pot directly from the tap (approximately 1/2 cup of water). Distilled water is recommended if the purity/safety of the tap water in your region is questionable.   3. Add 1/4 to 1/2 teaspoon of pickling salt, sea salt or table salt (without added iodine) to the water. Stir with spoon to dissolve thoroughly. You may also use prepackaged packets.  4. Lean your head forward over the basin, bending your neck down slightly with your eyes looking downwards.   5. Gently place the spout of the neti pot inside your right nostril, forming a seal to avoid any outer leakage.   6. Open your mouth slightly. Breathe continuously through your open mouth during this sinus cleansing procedure. This allows a necessary air passageway so that the water will not drain from behind your nose into your mouth.   7. Tilt your head sideways, so that your right nostril is directly above your left nostril. Tip the neti pot, allowing the water solution to pour into your right nostril. Within a few seconds the water will naturally drain from your left nostril into the sink.   8. After the net pot is empty, remove the spout from your right nostril, and exhale through both nostrils. Gently blow your nose into a tissue.   9. Repeat steps 1 through 7 for your left nostril.  Tips:   1. Thoroughly clean your Neti Pot after each use. Periodically place it in your  for a thorough sanitizing.Same as a toothbrush, do not share your neti pot with anyone else. Everyone in the household should have their own neti pot.   2. Try using only half the amount of recommended salt the first few times you use your neti pot until you become more accustomed to the process.   3. Applying a thin layer of petroleum jelly on the inside of both nostrils before the  treatment helps sooth sensitive skin.   4. You may notice improved breathing, smell and taste.   5. If you experience any discomfort please discontinue using your neti pot and consult your primary care doctor.

## 2021-10-03 ENCOUNTER — HEALTH MAINTENANCE LETTER (OUTPATIENT)
Age: 41
End: 2021-10-03

## 2021-10-06 ENCOUNTER — ALLIED HEALTH/NURSE VISIT (OUTPATIENT)
Dept: FAMILY MEDICINE | Facility: CLINIC | Age: 41
End: 2021-10-06
Payer: COMMERCIAL

## 2021-10-06 DIAGNOSIS — Z23 NEED FOR PROPHYLACTIC VACCINATION AND INOCULATION AGAINST INFLUENZA: Primary | ICD-10-CM

## 2021-10-06 NOTE — PROGRESS NOTES
"Injectable Influenza Immunization Documentation    1.  Has the patient received the information for the injectable influenza vaccine? YES     2. Is the patient 6 months of age or older? YES     3. Does the patient have any of the following contraindications?         Severe allergy to eggs? No     Severe allergic reaction to previous influenza vaccines? No   Severe allergy to latex? No       History of Guillain-[place] syndrome? No     Currently have a temperature greater than 100.4F? No        4.  Severely egg allergic patients should have flu vaccine eligibility assessed by an MD, RN, or pharmacist, and those who received flu vaccine should be observed for 15 min by an MD, RN, Pharmacist, Medical Technician, or member of clinic staff.\": YES    5. Latex-allergic patients should be given latex-free influenza vaccine Yes. Please reference the Vaccine latex table to determine if your clinic s product is latex-containing.       Vaccination given by Mela Lambret CMA,[facility]  October 6, 2021 11:22 AM        Isiah ANTHONY Nuno comes into clinic today at the request of Dr. Ferrer Ordering Provider for Flu shot.    This service provided today was under the supervising provider of the day Dr. Ferrer, who was available if needed.    Mela Lambert CMA          "

## 2022-01-17 ENCOUNTER — MYC MEDICAL ADVICE (OUTPATIENT)
Dept: FAMILY MEDICINE | Facility: CLINIC | Age: 42
End: 2022-01-17
Payer: COMMERCIAL

## 2022-01-18 NOTE — TELEPHONE ENCOUNTER
Can she continue the Mucinex D? Or should he just take the plain Mucinex?  How many days can he take this?    Thank tyou

## 2022-02-11 ENCOUNTER — OFFICE VISIT (OUTPATIENT)
Dept: FAMILY MEDICINE | Facility: CLINIC | Age: 42
End: 2022-02-11
Payer: COMMERCIAL

## 2022-02-11 VITALS
HEIGHT: 65 IN | BODY MASS INDEX: 30.16 KG/M2 | HEART RATE: 71 BPM | RESPIRATION RATE: 15 BRPM | TEMPERATURE: 98.7 F | WEIGHT: 181 LBS | DIASTOLIC BLOOD PRESSURE: 84 MMHG | OXYGEN SATURATION: 97 % | SYSTOLIC BLOOD PRESSURE: 129 MMHG

## 2022-02-11 DIAGNOSIS — G47.01 INSOMNIA DUE TO MEDICAL CONDITION: ICD-10-CM

## 2022-02-11 DIAGNOSIS — R05.9 COUGH: Primary | ICD-10-CM

## 2022-02-11 RX ORDER — ZOLPIDEM TARTRATE 10 MG/1
10 TABLET ORAL
Qty: 21 TABLET | Refills: 0 | Status: SHIPPED | OUTPATIENT
Start: 2022-02-11 | End: 2022-02-11

## 2022-02-11 RX ORDER — ZOLPIDEM TARTRATE 10 MG/1
10 TABLET ORAL
Qty: 21 TABLET | Refills: 0 | Status: SHIPPED | OUTPATIENT
Start: 2022-02-11 | End: 2022-02-15

## 2022-02-11 ASSESSMENT — MIFFLIN-ST. JEOR: SCORE: 1483.14

## 2022-02-11 NOTE — PROGRESS NOTES
ASSESSMENT AND PLAN:     (R05.9) Cough  (primary encounter diagnosis)  Comment: Explained that cough is normal 4 weeks after COVID infection. Not having other concerning symptoms, is still able to exercise. Reviewed supportive care measures for home. Suspect mouth dryness is at least due in part to Unisom and encouraged stopping it (see below).  If still coughing at 2 months, would consider x-ray, so I asked her to reach out if this hasn't resolved by then.   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 Has been quite disruptive to her life. Either way, I think Isiah would benefit from improved sleep hygiene and a brief course of a hypnotic to help reset her sleep cycle. Encouraged her to not stay in bed but rather to get out of bed, go somewhere dark and quiet and do something non-stimulating. Okay to return to bed if confident she'll fall back asleep.   Encouraged healthy diet, keeping to as regular of a sleep schedule as possible, exercising regularly.  Reviewed side effects of Ambien including complex sleep behaviors, tolerance/dependence. Prescribing a 3 week course. Historically has used 10mg tablet in the past for travel and worked well. Would plan to send to sleep medicine for consultation if still having difficulties after the 3 weeks.     Plan: zolpidem (AMBIEN) 10 MG tablet,      Lui Bassett MD   [facility]  02/11/2022, 6:34 PM      SUBJECTIVE:   Isiah is a 41 year old female who presents to clinic today for a return visit.    # COVID  - daughter was first to get sick  - Isiah developed symptoms on 1/12/22: sore throat, muscle aches, chills, cough, nausea, diarrhea, fatigue, hoarse voice  - has had 3 doses of COVID vaccine  - tested positive for COVID-19    - first week of symptoms was the worst, felt drained  - after about a week, fevers went away, started feeling like she had more  "energy  - had a couple of weeks of sinus drainage, nasal congestion that have resolved  - cough has persisted  - cough is dry  - not particularly worse at any of time  - throat is really dry in the morning  - feels like she has a tickle in the back of her throat  - if she talks for a while will cough    - has started having trouble with sleep  - taking Unisom which is helping with falling asleep  - will wake up around 3-4am, tries to fall asleep but ends up just laying in bed      - working  - has a 2 year old      Patient Active Problem List   Diagnosis     Seasonal allergic rhinitis     Current Outpatient Medications   Medication     fexofenadine (ALLEGRA) 180 MG tablet     fluticasone (FLONASE) 50 MCG/ACT nasal spray     montelukast (SINGULAIR) 10 MG tablet     zolpidem (AMBIEN) 10 MG tablet     No current facility-administered medications for this visit.       I have reviewed the patient's relevant past medical history.     OBJECTIVE:   /84 (BP Location: Right arm, Patient Position: Sitting, Cuff Size: Adult Regular)   Pulse 71   Temp 98.7  F (37.1  C) (Skin)   Resp 15   Ht 1.645 m (5' 4.76\")   Wt 82.1 kg (181 lb)   LMP 01/14/2022   SpO2 97%   BMI 30.34 kg/m      Constitutional: well-appearing, appears stated age  Eyes: conjunctivae without erythema, sclera anicteric.   ENT: normal oropharynx  Cardiac: regular rate and rhythm, normal S1/S2, no murmur/rubs/gallops  Respiratory: lungs clear to auscultation bilaterally, normal work of breathing, no wheezes/crackles  Skin: no rashes, lesions, or wounds  Psych: affect is full and appropriate, speech is fluent and non-pressured  "

## 2022-02-17 PROBLEM — O09.529 ANTEPARTUM MULTIGRAVIDA OF ADVANCED MATERNAL AGE: Status: RESOLVED | Noted: 2019-04-04 | Resolved: 2021-05-04

## 2022-02-17 PROBLEM — B95.1 POSITIVE GBS TEST: Status: RESOLVED | Noted: 2019-03-21 | Resolved: 2021-05-04

## 2022-02-17 PROBLEM — O09.819 PREGNANCY RESULTING FROM IN VITRO FERTILIZATION, ANTEPARTUM: Status: RESOLVED | Noted: 2019-04-04 | Resolved: 2021-05-04

## 2022-03-24 ASSESSMENT — ENCOUNTER SYMPTOMS
COUGH: 0
PALPITATIONS: 0
HEMATOCHEZIA: 0
NAUSEA: 0
ARTHRALGIAS: 0
DYSURIA: 0
NERVOUS/ANXIOUS: 0
HEADACHES: 0
BREAST MASS: 0
SORE THROAT: 0
EYE PAIN: 0
DIARRHEA: 0
CHILLS: 0
JOINT SWELLING: 0
FEVER: 0
PARESTHESIAS: 0
SHORTNESS OF BREATH: 0
MYALGIAS: 0
FREQUENCY: 0
WEAKNESS: 0
HEMATURIA: 0
ABDOMINAL PAIN: 0
CONSTIPATION: 0
HEARTBURN: 0

## 2022-03-30 ASSESSMENT — ENCOUNTER SYMPTOMS
PALPITATIONS: 0
NERVOUS/ANXIOUS: 0
HEADACHES: 0
WEAKNESS: 0
FEVER: 0
ABDOMINAL PAIN: 0
COUGH: 0
FREQUENCY: 0
EYE PAIN: 0
PARESTHESIAS: 0
HEMATURIA: 0
HEARTBURN: 0
CHILLS: 0
ARTHRALGIAS: 0
SHORTNESS OF BREATH: 0
NAUSEA: 0
DYSURIA: 0
SORE THROAT: 0
CONSTIPATION: 0
MYALGIAS: 0
JOINT SWELLING: 0
HEMATOCHEZIA: 0
DIARRHEA: 0
BREAST MASS: 0

## 2022-03-30 NOTE — PROGRESS NOTES
SUBJECTIVE:   CC: Isiah Reina is an 41 year old woman who presents for preventive health visit.     Healthy Habits:     Getting at least 3 servings of Calcium per day:  NO    Bi-annual eye exam:  Yes    Dental care twice a year:  Yes    Sleep apnea or symptoms of sleep apnea:  None    Diet:  Regular (no restrictions)    Frequency of exercise:  4-5 days/week    Duration of exercise:  45-60 minutes    Taking medications regularly:  Yes    Medication side effects:  Not applicable    PHQ-2 Total Score: 1    Additional concerns today:  No    Today's PHQ-2 Score:   PHQ-2 ( 1999 [employer]) 3/24/2022   Q1: Little interest or pleasure in doing things 0   Q2: Feeling down, depressed or hopeless 1   PHQ-2 Score 1   PHQ-2 Total Score (12-17 Years)- Positive if 3 or more points; Administer PHQ-A if positive -   Q1: Little interest or pleasure in doing things Not at all   Q2: Feeling down, depressed or hopeless Several days   PHQ-2 Score 1       Abuse: Current or Past (Physical, Sexual or Emotional) - No  Do you feel safe in your environment? Yes    Have you ever done Advance Care Planning? (For example, a Health Directive, POLST, or a discussion with a medical provider or your loved ones about your wishes): No, advance care planning information given to patient to review.  Patient plans to discuss their wishes with loved ones or provider.      Social History     Tobacco Use     Smoking status: Never Smoker     Smokeless tobacco: Never Used   Substance Use Topics     Alcohol use: Not Currently     Alcohol/week: 1.0 standard drink     Comment: stopped in ifv   and pregnancy       Alcohol Use 3/24/2022   Prescreen: >3 drinks/day or >7 drinks/week? No     Reviewed orders with patient.  Reviewed health maintenance and updated orders accordingly - Yes  BP Readings from Last 3 Encounters:   03/31/22 100/70   02/11/22 129/84   11/20/19 119/83    Wt Readings from Last 3 Encounters:   03/31/22 83.5 kg (184 lb)   02/11/22 82.1 kg (181  lb)   11/20/19 76.5 kg (168 lb 11.2 oz)                  Patient Active Problem List   Diagnosis     Seasonal allergic rhinitis     Past Surgical History:   Procedure Laterality Date     COLONOSCOPY  ~10 years ago     ivf   egg retreival      times 2       Social History     Tobacco Use     Smoking status: Never Smoker     Smokeless tobacco: Never Used   Substance Use Topics     Alcohol use: Not Currently     Alcohol/week: 1.0 standard drink     Comment: stopped in ifv   and pregnancy     Family History   Adopted: Yes         Current Outpatient Medications   Medication Sig Dispense Refill     fexofenadine (ALLEGRA) 180 MG tablet        fluticasone (FLONASE) 50 MCG/ACT nasal spray        montelukast (SINGULAIR) 10 MG tablet Take 1 tablet (10 mg) by mouth At Bedtime 90 tablet 3     zolpidem ER (AMBIEN CR) 6.25 MG CR tablet Take 1 tablet (6.25 mg) by mouth nightly as needed for sleep 21 tablet 0     azelastine (ASTELIN) 0.1 % nasal spray INSTILL 1 SPRAY IN EACH NOSTRIL TWICE A DAY       betamethasone dipropionate (DIPROSONE) 0.05 % external lotion PLEASE SEE ATTACHED FOR DETAILED DIRECTIONS       EPINEPHrine (ANY BX GENERIC EQUIV) 0.3 MG/0.3ML injection 2-pack USE 1 TO 2 INJECTIONS AS NEEDED FOR ALLERGIC REACTION AS DIRECTED       ketoconazole (NIZORAL) 2 % external shampoo WASH AFFECTED AREA ON SCALP & FACE 2-3 TIMES WEEKLY IN SHOWER. LATHER, LET SIT 2-3 MINS THEN RINSE       Allergies   Allergen Reactions     Ibuprofen Anaphylaxis     Recent Labs   Lab Test 10/12/19  0631 02/06/19  1118 02/23/18  1555 02/23/18  1527   LDL  --   --  83.0  --    HDL  --   --  66.0  --    TRIG  --   --  108.0  --    ALT 16  --  14.0  --    CR  --   --   --  0.5*   POTASSIUM  --   --   --  3.7   TSH  --  1.23  --   --         Breast Cancer Screening:    Breast CA Risk Assessment (FHS-7) 3/24/2022   Do you have a family history of breast, colon, or ovarian cancer? No / Unknown         History of abnormal Pap smear: NO - age 30-65 PAP  every 5 years with negative HPV co-testing recommended  PAP / HPV Latest Ref Rng & Units 2016   PAP (Historical) - NIL   HPV16 NEG Negative   HPV18 NEG Negative   HRHPV NEG Negative     Reviewed and updated as needed this visit by clinical staff   Tobacco  Allergies  Meds  Problems  Med Hx  Surg Hx  Fam Hx  Soc   Hx        Reviewed and updated as needed this visit by Provider      Problems   Surg Hx          Past Medical History:   Diagnosis Date     Allergic rhinitis      Depression     During grad school only, was situational. She received counseling weekly for a period of time. No suicidal ideation. No medicaiton needed.     Depressive disorder      Hx of previous reproductive problem     3 medicated IUIs, 2 rounds of IVF, 2 FETs, 1 chemical pregn     Rectal bleeding     had colonoscopy all normal, no recurrence     Right wrist fracture       Past Surgical History:   Procedure Laterality Date     COLONOSCOPY  ~10 years ago     ivf   egg retreival      times 2     OB History    Para Term  AB Living   1 1 1 0 0 1   SAB IAB Ectopic Multiple Live Births   0 0 0 0 1      # Outcome Date GA Lbr Tyrell/2nd Weight Sex Delivery Anes PTL Lv   1 Term 10/12/19 39w5d 07:39 / 00:43 4.42 kg (9 lb 11.9 oz) F Vag-Spont EPI, Nitrous N CHRISTOPHE      Complications: GBS, Prolonged PROM (>18 hours)      Name: CAMELIA MAYNARD- SRIDEVI      Apgar1: 8  Apgar5: 9       Review of Systems   Constitutional: Negative for chills and fever.   HENT: Negative for congestion, ear pain, hearing loss and sore throat.    Eyes: Negative for pain and visual disturbance.   Respiratory: Negative for cough and shortness of breath.    Cardiovascular: Negative for chest pain, palpitations and peripheral edema.   Gastrointestinal: Negative for abdominal pain, constipation, diarrhea, heartburn, hematochezia and nausea.   Breasts:  Negative for tenderness, breast mass and discharge.   Genitourinary: Negative for dysuria,  "frequency, genital sores, hematuria, pelvic pain, urgency, vaginal bleeding and vaginal discharge.   Musculoskeletal: Negative for arthralgias, joint swelling and myalgias.   Skin: Negative for rash.   Neurological: Negative for weakness, headaches and paresthesias.   Psychiatric/Behavioral: Negative for mood changes. The patient is not nervous/anxious.         OBJECTIVE:   /70   Pulse 60   Temp 98.5  F (36.9  C)   Ht 1.651 m (5' 5\")   Wt 83.5 kg (184 lb)   LMP 03/10/2022 (Exact Date)   SpO2 98%   Breastfeeding No   BMI 30.62 kg/m    Physical Exam  GENERAL: healthy, alert and no distress  EYES: Eyes grossly normal to inspection, PERRL and conjunctivae and sclerae normal  HENT: ear canals and TM's normal, nose and mouth without ulcers or lesions  NECK: no adenopathy, no asymmetry, masses, or scars and thyroid normal to palpation  RESP: lungs clear to auscultation - no rales, rhonchi or wheezes  BREAST: normal without masses, tenderness or nipple discharge and no palpable axillary masses or adenopathy  CV: regular rate and rhythm, normal S1 S2, no S3 or S4, no murmur, click or rub, no peripheral edema and peripheral pulses strong  ABDOMEN: soft, nontender, no hepatosplenomegaly, no masses and bowel sounds normal   (female): normal female external genitalia, vaginal mucosa pink, moist, well rugated, normal cervix, adnexae, and uterus without masses. and urethral meatus appears swollen, irritated and red  MS: no gross musculoskeletal defects noted, no edema  SKIN: no suspicious lesions or rashes  NEURO: Normal strength and tone, mentation intact and speech normal  PSYCH: mentation appears normal, affect normal/bright    ASSESSMENT/PLAN:   (Z00.00) Routine general medical examination at a health care facility  (primary encounter diagnosis)  (Z12.4) Cervical cancer screening  Comment:   Plan: Pap Screen with HPV - recommended age 30 - 65         years        routine    (Z13.6) CARDIOVASCULAR SCREENING; LDL " "GOAL LESS THAN 160  Comment:   Plan: Lipid panel reflex to direct LDL Fasting            (E55.9) Vitamin D deficiency  Comment:   Plan: Vitamin D Deficiency            (N36.9) Urethral lesion  Comment: New, previously undiagnosed problem with uncertain prognosis and additional work-up planned.   Urethral cyst vs prolapse vs other mass vs local irritation/friability vs normal varient  Asymptomatic  Will obtain labs as below and refer to urology for possible cystoscopy  Plan: Adult Urology Referral, UA Macro with Reflex to        Micro and Culture - lab collect, CBC with         platelets and differential, Basic metabolic         panel  (Ca, Cl, CO2, Creat, Gluc, K, Na, BUN)          Patient has been advised of split billing requirements and indicates understanding: Yes    COUNSELING:  Reviewed preventive health counseling, as reflected in patient instructions   I did discuss possibility of pregnancy, even with hx of infertility, she does not want to use contraception, would welcome a pregnancy but feels it's very unlikely    Estimated body mass index is 30.62 kg/m  as calculated from the following:    Height as of this encounter: 1.651 m (5' 5\").    Weight as of this encounter: 83.5 kg (184 lb).    Weight management plan: Discussed healthy diet and exercise guidelines    She reports that she has never smoked. She has never used smokeless tobacco.      Counseling Resources:  ATP IV Guidelines  Pooled Cohorts Equation Calculator  Breast Cancer Risk Calculator  BRCA-Related Cancer Risk Assessment: FHS-7 Tool  FRAX Risk Assessment  ICSI Preventive Guidelines  Dietary Guidelines for Americans, 2010  USDA's MyPlate  ASA Prophylaxis  Lung CA Screening    Eloise Lowery MD  [facility]  "

## 2022-03-31 ENCOUNTER — MYC MEDICAL ADVICE (OUTPATIENT)
Dept: FAMILY MEDICINE | Facility: CLINIC | Age: 42
End: 2022-03-31

## 2022-03-31 ENCOUNTER — OFFICE VISIT (OUTPATIENT)
Dept: FAMILY MEDICINE | Facility: CLINIC | Age: 42
End: 2022-03-31
Payer: COMMERCIAL

## 2022-03-31 VITALS
WEIGHT: 184 LBS | TEMPERATURE: 98.5 F | HEIGHT: 65 IN | DIASTOLIC BLOOD PRESSURE: 70 MMHG | HEART RATE: 60 BPM | OXYGEN SATURATION: 98 % | BODY MASS INDEX: 30.66 KG/M2 | SYSTOLIC BLOOD PRESSURE: 100 MMHG

## 2022-03-31 DIAGNOSIS — Z00.00 ROUTINE GENERAL MEDICAL EXAMINATION AT A HEALTH CARE FACILITY: Primary | ICD-10-CM

## 2022-03-31 DIAGNOSIS — E55.9 VITAMIN D DEFICIENCY: ICD-10-CM

## 2022-03-31 DIAGNOSIS — Z12.4 CERVICAL CANCER SCREENING: ICD-10-CM

## 2022-03-31 DIAGNOSIS — Z13.6 CARDIOVASCULAR SCREENING; LDL GOAL LESS THAN 160: ICD-10-CM

## 2022-03-31 DIAGNOSIS — N36.9 URETHRAL LESION: ICD-10-CM

## 2022-03-31 DIAGNOSIS — J30.1 SEASONAL ALLERGIC RHINITIS DUE TO POLLEN: ICD-10-CM

## 2022-03-31 LAB
ALBUMIN UR-MCNC: NEGATIVE MG/DL
APPEARANCE UR: CLEAR
BACTERIA #/AREA URNS HPF: ABNORMAL /HPF
BASOPHILS # BLD AUTO: 0 10E3/UL (ref 0–0.2)
BASOPHILS NFR BLD AUTO: 1 %
BILIRUB UR QL STRIP: NEGATIVE
COLOR UR AUTO: YELLOW
DEPRECATED CALCIDIOL+CALCIFEROL SERPL-MC: 23 UG/L (ref 20–75)
EOSINOPHIL # BLD AUTO: 0.1 10E3/UL (ref 0–0.7)
EOSINOPHIL NFR BLD AUTO: 2 %
ERYTHROCYTE [DISTWIDTH] IN BLOOD BY AUTOMATED COUNT: 13.2 % (ref 10–15)
GLUCOSE UR STRIP-MCNC: NEGATIVE MG/DL
HCT VFR BLD AUTO: 37.1 % (ref 35–47)
HGB BLD-MCNC: 12.8 G/DL (ref 11.7–15.7)
HGB UR QL STRIP: NEGATIVE
IMM GRANULOCYTES # BLD: 0 10E3/UL
IMM GRANULOCYTES NFR BLD: 0 %
KETONES UR STRIP-MCNC: NEGATIVE MG/DL
LEUKOCYTE ESTERASE UR QL STRIP: ABNORMAL
LYMPHOCYTES # BLD AUTO: 1.7 10E3/UL (ref 0.8–5.3)
LYMPHOCYTES NFR BLD AUTO: 30 %
MCH RBC QN AUTO: 27.4 PG (ref 26.5–33)
MCHC RBC AUTO-ENTMCNC: 34.5 G/DL (ref 31.5–36.5)
MCV RBC AUTO: 79 FL (ref 78–100)
MONOCYTES # BLD AUTO: 0.5 10E3/UL (ref 0–1.3)
MONOCYTES NFR BLD AUTO: 9 %
NEUTROPHILS # BLD AUTO: 3.5 10E3/UL (ref 1.6–8.3)
NEUTROPHILS NFR BLD AUTO: 59 %
NITRATE UR QL: NEGATIVE
PH UR STRIP: 5.5 [PH] (ref 5–7)
PLATELET # BLD AUTO: 229 10E3/UL (ref 150–450)
RBC # BLD AUTO: 4.67 10E6/UL (ref 3.8–5.2)
RBC #/AREA URNS AUTO: ABNORMAL /HPF
SP GR UR STRIP: 1.01 (ref 1–1.03)
SQUAMOUS #/AREA URNS AUTO: ABNORMAL /LPF
UROBILINOGEN UR STRIP-ACNC: 0.2 E.U./DL
WBC # BLD AUTO: 5.9 10E3/UL (ref 4–11)
WBC #/AREA URNS AUTO: ABNORMAL /HPF

## 2022-03-31 PROCEDURE — 82306 VITAMIN D 25 HYDROXY: CPT | Performed by: FAMILY MEDICINE

## 2022-03-31 PROCEDURE — G0145 SCR C/V CYTO,THINLAYER,RESCR: HCPCS | Performed by: FAMILY MEDICINE

## 2022-03-31 PROCEDURE — 87624 HPV HI-RISK TYP POOLED RSLT: CPT | Performed by: FAMILY MEDICINE

## 2022-03-31 PROCEDURE — 80048 BASIC METABOLIC PNL TOTAL CA: CPT | Performed by: FAMILY MEDICINE

## 2022-03-31 PROCEDURE — 85025 COMPLETE CBC W/AUTO DIFF WBC: CPT | Performed by: FAMILY MEDICINE

## 2022-03-31 PROCEDURE — 36415 COLL VENOUS BLD VENIPUNCTURE: CPT | Performed by: FAMILY MEDICINE

## 2022-03-31 PROCEDURE — 99213 OFFICE O/P EST LOW 20 MIN: CPT | Mod: 25 | Performed by: FAMILY MEDICINE

## 2022-03-31 PROCEDURE — 81001 URINALYSIS AUTO W/SCOPE: CPT | Performed by: FAMILY MEDICINE

## 2022-03-31 PROCEDURE — 80061 LIPID PANEL: CPT | Performed by: FAMILY MEDICINE

## 2022-03-31 PROCEDURE — 99386 PREV VISIT NEW AGE 40-64: CPT | Performed by: FAMILY MEDICINE

## 2022-03-31 RX ORDER — AZELASTINE 1 MG/ML
SPRAY, METERED NASAL
COMMUNITY
Start: 2021-08-16

## 2022-03-31 RX ORDER — MONTELUKAST SODIUM 10 MG/1
10 TABLET ORAL AT BEDTIME
Qty: 90 TABLET | Refills: 3 | Status: SHIPPED | OUTPATIENT
Start: 2022-03-31

## 2022-03-31 RX ORDER — KETOCONAZOLE 20 MG/ML
SHAMPOO TOPICAL
COMMUNITY
Start: 2021-12-22 | End: 2023-10-10

## 2022-03-31 RX ORDER — BETAMETHASONE DIPROPIONATE 0.5 MG/G
LOTION TOPICAL
COMMUNITY
Start: 2021-12-22 | End: 2023-10-10

## 2022-03-31 RX ORDER — EPINEPHRINE 0.3 MG/.3ML
INJECTION SUBCUTANEOUS
COMMUNITY
Start: 2021-05-14

## 2022-03-31 NOTE — TELEPHONE ENCOUNTER
Dr. Lowery-Please review and sign if agree  1. Patient stated she forgot to request Singulair refill at today's visit   A. Order pended.    Thank you!  LESLYE LatifN, RN  [facility]

## 2022-04-01 LAB
ANION GAP SERPL CALCULATED.3IONS-SCNC: 6 MMOL/L (ref 3–14)
BUN SERPL-MCNC: 12 MG/DL (ref 7–30)
CALCIUM SERPL-MCNC: 8.7 MG/DL (ref 8.5–10.1)
CHLORIDE BLD-SCNC: 107 MMOL/L (ref 94–109)
CHOLEST SERPL-MCNC: 155 MG/DL
CO2 SERPL-SCNC: 24 MMOL/L (ref 20–32)
CREAT SERPL-MCNC: 0.53 MG/DL (ref 0.52–1.04)
FASTING STATUS PATIENT QL REPORTED: NORMAL
GFR SERPL CREATININE-BSD FRML MDRD: >90 ML/MIN/1.73M2
GLUCOSE BLD-MCNC: 99 MG/DL (ref 70–99)
HDLC SERPL-MCNC: 58 MG/DL
LDLC SERPL CALC-MCNC: 83 MG/DL
NONHDLC SERPL-MCNC: 97 MG/DL
POTASSIUM BLD-SCNC: 3.8 MMOL/L (ref 3.4–5.3)
SODIUM SERPL-SCNC: 137 MMOL/L (ref 133–144)
TRIGL SERPL-MCNC: 72 MG/DL

## 2022-04-01 NOTE — RESULT ENCOUNTER NOTE
It was a pleasure to meet you today!  Some of your labs are back already.    Your cbc, or complete blood count, which measures red blood cells (to check for anemia and other vitamin deficiencies) and white blood cells (to check for infection and leukemia) is normal, which is great!  Your platelets, which reflect liver function and ability to clot, are normal as well.     Your vitamin D is normal but on the low end of normal. Please take 2000 units daily.     Your urine test was normal, with no sign of infection or kidney problems.     Sincerely,  Dr. Eloise Lowery MD  4/1/2022

## 2022-04-04 LAB
BKR LAB AP GYN ADEQUACY: NORMAL
BKR LAB AP GYN INTERPRETATION: NORMAL
BKR LAB AP HPV REFLEX: NORMAL
BKR LAB AP PREVIOUS ABNORMAL: NORMAL
PATH REPORT.COMMENTS IMP SPEC: NORMAL
PATH REPORT.COMMENTS IMP SPEC: NORMAL
PATH REPORT.RELEVANT HX SPEC: NORMAL

## 2022-04-04 NOTE — RESULT ENCOUNTER NOTE
Brendon Ms. Reina,  Some of your results came back and are within acceptable limits. -Cholesterol levels (LDL,HDL, Triglycerides) are normal.  ADVISE: rechecking in 1 year.   -Liver and gallbladder tests are normal (ALT,AST, Alk phos, bilirubin), kidney function is normal (Cr, GFR), sodium is normal, potassium is normal, calcium is normal, glucose is normal.. If you have any further concerns please do not hesitate to contact us by message, phone or making an appointment.  Have a good day   Dr Kaleb BUENO in PCP absence this week

## 2022-04-06 LAB
HUMAN PAPILLOMA VIRUS 16 DNA: NEGATIVE
HUMAN PAPILLOMA VIRUS 18 DNA: NEGATIVE
HUMAN PAPILLOMA VIRUS FINAL DIAGNOSIS: NORMAL
HUMAN PAPILLOMA VIRUS OTHER HR: NEGATIVE

## 2022-04-07 ENCOUNTER — OFFICE VISIT (OUTPATIENT)
Dept: URGENT CARE | Facility: URGENT CARE | Age: 42
End: 2022-04-07
Payer: COMMERCIAL

## 2022-04-07 VITALS
HEIGHT: 65 IN | BODY MASS INDEX: 30.66 KG/M2 | WEIGHT: 184 LBS | TEMPERATURE: 98 F | DIASTOLIC BLOOD PRESSURE: 86 MMHG | RESPIRATION RATE: 16 BRPM | OXYGEN SATURATION: 99 % | HEART RATE: 65 BPM | SYSTOLIC BLOOD PRESSURE: 118 MMHG

## 2022-04-07 DIAGNOSIS — J30.2 SEASONAL ALLERGIC RHINITIS, UNSPECIFIED TRIGGER: Primary | ICD-10-CM

## 2022-04-07 DIAGNOSIS — H10.13 ALLERGIC CONJUNCTIVITIS OF BOTH EYES: ICD-10-CM

## 2022-04-07 DIAGNOSIS — R05.9 COUGH: ICD-10-CM

## 2022-04-07 LAB
FLUAV AG SPEC QL IA: NEGATIVE
FLUBV AG SPEC QL IA: NEGATIVE

## 2022-04-07 PROCEDURE — 87804 INFLUENZA ASSAY W/OPTIC: CPT | Performed by: EMERGENCY MEDICINE

## 2022-04-07 PROCEDURE — 99213 OFFICE O/P EST LOW 20 MIN: CPT | Performed by: EMERGENCY MEDICINE

## 2022-04-07 RX ORDER — IPRATROPIUM BROMIDE 21 UG/1
2 SPRAY, METERED NASAL 3 TIMES DAILY
Qty: 30 ML | Refills: 0 | Status: SHIPPED | OUTPATIENT
Start: 2022-04-07 | End: 2023-10-10

## 2022-04-07 RX ORDER — GUAIFENESIN 600 MG/1
1200 TABLET, EXTENDED RELEASE ORAL 2 TIMES DAILY
Qty: 12 TABLET | Refills: 0 | Status: SHIPPED | OUTPATIENT
Start: 2022-04-07 | End: 2023-10-10

## 2022-04-07 NOTE — PATIENT INSTRUCTIONS
Patient Education     Allergic Rhinitis  Allergic rhinitis is an allergic reaction that affects the nose, and often the eyes. It s often known as nasal allergies. Nasal allergies are often due to things in the environment that are breathed in. Depending what you are sensitive to, nasal allergies may occur only during certain seasons, or they may occur year round. Common indoor allergens include house dust mites, mold, cockroaches, and pet dander. Outdoor allergens include pollen from trees, grasses, and weeds.    Symptoms include a drippy, stuffy, and itchy nose. They also include sneezing and red and itchy eyes. You may feel tired more often. Severe allergies may also affect your breathing and trigger a condition called asthma.    Tests can be done to see what allergens are affecting you. You may be referred to an allergy specialist for testing and further evaluation.   Home care  Your healthcare provider may prescribe medicines to help relieve allergy symptoms. These may include oral medicines, nasal sprays, or eye drops.   Ask your provider for advice on how to stay away from substances that you are allergic to. Below are a few tips for each type of allergen.   Pet dander:    Do not have pets with fur and feathers.    If you have a pet, keep it out of your bedroom and off upholstered furniture.  Pollen:    When pollen counts are high, keep windows of your car and home closed. If possible, use an air conditioner instead.    Wear a filter mask when mowing or doing yard work.  House dust mites:    Wash bedding every week in warm water and detergent and dry on a hot setting.    Cover the mattress, box spring, and pillows with allergy covers.     If possible, sleep in a room with no carpet, curtains, or upholstered furniture.  Cockroaches:    Store food in sealed containers.    Remove garbage from the home promptly.    Fix water leaks.  Mold:    Keep humidity low by using a dehumidifier or air conditioner. Keep the  dehumidifier and air conditioner clean and free of mold.    Clean moldy areas with bleach and water. Don't mix bleach with other .  In general:    Vacuum once or twice a week. If possible, use a vacuum with a high-efficiency particulate air (HEPA) filter.    Don't smoke. Stay away from cigarette smoke. Cigarette smoke is an irritant that can make symptoms worse.  Follow-up care  Follow up as advised by the healthcare provider or our staff. If you were referred to an allergy specialist, make this appointment promptly.   When to seek medical advice  Call your healthcare provider or get medical care right away if the following occur:     Coughing    Fever of 100.4 F (38 C) or higher, or as directed by your healthcare provider    Raised red bumps (hives)    Continuing symptoms, new symptoms, or worsening symptoms  Call 911  Call 911 if you have:     Trouble breathing    Severe swelling of the face or severe itching of the eyes or mouth    Wheezing or shortness of breath    Chest tightness    Dizziness or lightheadedness    Feeling of doom    Stomach pain, bloating, vomiting, or diarrhea  [employer] last reviewed this educational content on 10/1/2019    6893-7561 [employer]. All rights reserved. This information is not intended as a substitute for professional medical care. Always follow your healthcare professional's instructions.

## 2022-04-07 NOTE — PROGRESS NOTES
Assessment & Plan     Diagnosis:    (J30.2) Seasonal allergic rhinitis, unspecified trigger  (primary encounter diagnosis)  (ATROVENT)  0.03 % nasal spray, guaiFENesin (MUCINEX) 600 MG 12 hr tablet    (R05.9) Cough    (H10.13) Allergic conjunctivitis of both eyes      Medical Decision Making:  Isiah Reina is a 41 year old female who presents for evaluation of cough, shortness of breath and runny/stuffy nose and right eyelid itching/swelling.  This is consistent with an upper respiratory tract infection and allergic rhinitiis.     Given clear lungs, no fever, no hypoxia and no respiratory distress I do not feel the patient needs a CXR at this point as the probability of bacterial pneumonia is very unlikely. Flu negative here.    Furthermore, on exam she has swollen nasal mucosa with clear discharge.  Given history and exam, I feel that allergic rhinitis is contributing to her symptom constaellation. Most likely etiology is allergic rhinitis.  Will start medication as noted above for symptoms. Doubt bacterial etiology. Doubt intracerebral issues.  Certainly a viral syndrome is also a possibility; influenza testing is negative here and she notes that she recently tested negative for COVID-19 since smypomt onset. No signs of bacterial conjunctiivitis and visual acuity is intact; suspet allergic conjunctivity.    There are no gastrointestinal symptoms at this point and no signs of dehydration.  Close followup with PCP is indicated.  Go to ED for fever > 102 F, worsening shortness of breath, chest pain, vision changes or other concerns.  Patient verbalized understanding and agreement with the plan. Patient was discharged from clinic in stable condition.    Follow up with your PCP in 2-3 days as instructed.      Hiram Castro PA-C  [facility]    Subjective     Isiah Reina is a 41 year old female who presents to clinic today for the following health issues:  Chief Complaint   Patient presents  "with     Urgent Care     URI     coughing since Saturday, fever and chills, sweats. Now having tightness in chest. Covid test neg yesterday.        HPI    Onset of symptoms was 4 day(s) ago.  Course of illness is waxing and waning.    Severity moderate  Current and Associated symptoms: nasal congestion, runny nose, fever (felt warm on Saturday), cough - productive of clear phlegm, right eyelid itchy and swelling slightly.  Denies wheezing, shortness of breath, body aches, nausea, vomiting, diarrhea, not eating, not sleeping well.  taking in fluids?  Yes  Treatment measures tried include Tylenol/Ibuprofen and Decongestants (Mucinex D and Flonase)  Predisposing factors include None    Patient denies any chest pain, shortness of breath while at rest or with light activity, leg swelling, nausea, vomiting or difficulties swallowing food/fluids at this time.       Review of Systems    See HPI    Objective      Vitals: /86   Pulse 65   Temp 98  F (36.7  C) (Oral)   Resp 16   Ht 1.651 m (5' 5\")   Wt 83.5 kg (184 lb)   LMP 03/10/2022 (Exact Date)   SpO2 99%   Breastfeeding No   BMI 30.62 kg/m        Patient Vitals for the past 24 hrs:   BP Temp Temp src Pulse Resp SpO2 Height Weight   04/07/22 1414 118/86 98  F (36.7  C) Oral 65 16 99 % 1.651 m (5' 5\") 83.5 kg (184 lb)       Vital signs reviewed by: Hiram Castro PA-C    Physical Exam   Constitutional: Alert and active. With caregiver; in no acute distress.  Non-toxic appearing  No acute distress.  HENT:   Right Ear: External ear normal. TM: slightly bulging, no erythema.   Left Ear: External ear normal. TM: clear  Nose: Lower nasal turbinates are edematous and erythematous; clear drainage noted. Mild maxillary sinus tenderness to percussion. No masses or purulent drainage.   Eyes: Bilateral eyelids with mild swelling; no erythema, warmth or tenderness to palpation. EOMI. Conjunctivae is not injected.   Mouth: Mucous membranes are moist. Posterior oropharynx " is clear. No exudates. Normal tongue and tonsil. Uvula is midline. No submandibular swelling or erythema.  Neck: Normal ROM. No meningismus  Cardiovascular: Regular rate and rhythm  Pulmonary/Chest: Effort normal. No respiratory distress. Lungs are clear to auscultation throughout.  Musculoskeletal: Normal range of motion. No lower extremity tenderness or asymmetric edema.  Neurological: Alert and oriented x3.  Skin: No rash noted on visualized skin.       Labs/Imaging:  Results for orders placed or performed in visit on 04/07/22   Influenza A/B antigen     Status: Normal    Specimen: Nasopharyngeal; Swab   Result Value Ref Range    Influenza A antigen Negative Negative    Influenza B antigen Negative Negative    Narrative    Test results must be correlated with clinical data. If necessary, results should be confirmed by a molecular assay or viral culture.         Hiram Castro PA-C, April 7, 2022

## 2022-04-21 ENCOUNTER — PRE VISIT (OUTPATIENT)
Dept: UROLOGY | Facility: CLINIC | Age: 42
End: 2022-04-21
Payer: COMMERCIAL

## 2022-04-21 NOTE — TELEPHONE ENCOUNTER
Reason for visit: urethral lesion      Relevant information: referred by Dr. Lowery    Records/imaging/labs/orders: all records available    Pt called: no need for a call    At Rooming: collect a urine/pvr, undress for exam    Yaneth Ramos CMA  4/21/2022  9:59 AM

## 2022-04-28 ENCOUNTER — OFFICE VISIT (OUTPATIENT)
Dept: UROLOGY | Facility: CLINIC | Age: 42
End: 2022-04-28
Attending: FAMILY MEDICINE
Payer: COMMERCIAL

## 2022-04-28 VITALS
HEART RATE: 62 BPM | DIASTOLIC BLOOD PRESSURE: 78 MMHG | SYSTOLIC BLOOD PRESSURE: 113 MMHG | WEIGHT: 184 LBS | BODY MASS INDEX: 30.66 KG/M2 | HEIGHT: 65 IN

## 2022-04-28 DIAGNOSIS — N39.46 MIXED INCONTINENCE: Primary | ICD-10-CM

## 2022-04-28 DIAGNOSIS — N36.9 URETHRAL LESION: ICD-10-CM

## 2022-04-28 PROCEDURE — 99203 OFFICE O/P NEW LOW 30 MIN: CPT | Performed by: UROLOGY

## 2022-04-28 ASSESSMENT — PAIN SCALES - GENERAL: PAINLEVEL: NO PAIN (0)

## 2022-04-28 NOTE — LETTER
4/28/2022       RE: Isiah Reina  [address]  [place] [address]     Dear Colleague,    Thank you for referring your patient, Isiah Reina, to the [facility] [place] at [facility]. Please see a copy of my visit note below.    iApril 28, 2022    Referring Provider: Eloise Lowery MD  [address] [place] [address]    Primary Care Provider: Lui Bassett    Assessment & Plan     Urethral lesion  Unclear etiology, appears to have improved and is not bothersome.  Discussed options of observation, pelvic MRI and cystoscopy.  At this time wishes to observe.  Will return for repeat exam      Mixed incontinence  We discussed the etiologies of stress and urge incontinence and how they differ. We discussed that the options of treating stress incontinence to include observation, weight loss, pelvic floor physical therapy, incontinence pessary, urethral bulking agents, and surgical correction most commonly with midurethral sling or autologous rectus fascial sling. We then discussed that urge incontinence treatments include observation, weight loss, medications most commonly anticholinergics, physical therapy, biofeedback, intravesical botulinum toxin, percutaneous tibial nerve stimulation and sacral neuromodulation.     Wishes to start with PT. Discussed how this works and she is agreeable    - Physical Therapy Referral; Future      Return in about 6 months (around 10/28/2022) for in person.    28 minutes were spent on this day of the encounter in reviewing the EMR including reviewing Dr Lowery notes 3/31/22 as well as urinalysis from 3/31/22, direct patient care, coordination of care and documentation    Peggy Dow MD MPH  (she/her/hers)   of Urology  [facility]      HPI:  Isiah Reina is a 41 year old female who presents for evaluation of her pelvic floor symptoms.      She  recently was evaluated at her PCP's office and was found on exam to have a swollen, erythematous and irritated urethral.  She denies pain, gross hematuria, abnormal vaginal bleeding, vaginal bulge    She has mixed incontinence    She has has one pregnancy via IVF.  Still has embryo left and considering another pregnancy    Does not know family history as she is adopted    Past Medical History:   Diagnosis Date     Allergic rhinitis      Depression     During grad school only, was situational. She received counseling weekly for a period of time. No suicidal ideation. No medicaiton needed.     Depressive disorder 2005     Hx of previous reproductive problem 2017    3 medicated IUIs, 2 rounds of IVF, 2 FETs, 1 chemical pregn     Rectal bleeding     had colonoscopy all normal, no recurrence     Right wrist fracture 2014     Past Surgical History:   Procedure Laterality Date     COLONOSCOPY  ~10 years ago     ivf   egg retreival      times 2     Social History     Socioeconomic History     Marital status:      Spouse name: Brady Dreyer     Number of children: 0     Years of education: Graduate     Highest education level: Not on file   Occupational History     Occupation: marketing     Employer: [employer]   Tobacco Use     Smoking status: Never Smoker     Smokeless tobacco: Never Used   Substance and Sexual Activity     Alcohol use: Not Currently     Alcohol/week: 1.0 standard drink     Comment: stopped in ifv   and pregnancy     Drug use: No     Sexual activity: Not Currently     Partners: Male     Birth control/protection: None     Comment:    Other Topics Concern     Parent/sibling w/ CABG, MI or angioplasty before 65F 55M? No      Service No     Blood Transfusions No     Caffeine Concern No     Comment: 1cpd     Occupational Exposure No     Hobby Hazards No     Sleep Concern No     Comment: Worse recently becasue of buying a house.     Stress Concern No     Weight Concern No     Comment:  Gained weight in the past year, maybe stress related.     Special Diet No     Back Care No     Exercise Yes     Comment: 5-6 times running, weights once weekly     Bike Helmet Not Asked     Comment: NA     Seat Belt Yes     Self-Exams No   Social History Narrative    Moved from [place] two years ago.  .  No pets.  Works in marketing at [employer].  Pt was trained as a , but was offered a job in marketing. Undergrad in [place].    [employer] grad school in Chemistry. Baking is her hobby. Has friends at work and gym but is still establishing relationships.     Feels safe in environments.  Bought a house in [place].  travels a lot, is an .     YUKI Nix RN, MS,        How much exercise per week? 4-5 times week     How much calcium per day? prenatal       How much caffeine per day? 0    How much vitamin D per day? supplements    Do you/your family wear seatbelts?  Yes    Do you/your family use safety helmets? Yes    Do you/your family use sunscreen? Yes    Do you/your family keep firearms in the home? No    Do you/your family have a smoke detector(s)? Yes    Reviewed [facility] 3-         Social Determinants of Health     Financial Resource Strain: Not on file   Food Insecurity: Not on file   Transportation Needs: Not on file   Physical Activity: Not on file   Stress: Not on file   Social Connections: Not on file   Intimate Partner Violence: Not on file   Housing Stability: Not on file     Family History   Adopted: Yes     ROS    Allergies   Allergen Reactions     Ibuprofen Anaphylaxis     Current Outpatient Medications   Medication     betamethasone dipropionate (DIPROSONE) 0.05 % external lotion     EPINEPHrine (ANY BX GENERIC EQUIV) 0.3 MG/0.3ML injection 2-pack     fexofenadine (ALLEGRA) 180 MG tablet     fluticasone (FLONASE) 50 MCG/ACT nasal spray     guaiFENesin (MUCINEX) 600 MG 12 hr tablet     ipratropium (ATROVENT) 0.03 % nasal spray     ketoconazole (NIZORAL) 2 % external  "shampoo     montelukast (SINGULAIR) 10 MG tablet     zolpidem ER (AMBIEN CR) 6.25 MG CR tablet     azelastine (ASTELIN) 0.1 % nasal spray     No current facility-administered medications for this visit.   /78   Pulse 62   Ht 1.651 m (5' 5\")   Wt 83.5 kg (184 lb)   LMP 03/10/2022 (Exact Date)   BMI 30.62 kg/m    GENERAL: healthy, alert and no distress  EYES: Eyes grossly normal to inspection, conjunctivae and sclerae normal  HENT: normal cephalic/atraumatic.  External ears, nose and mouth without ulcers or lesions.  RESP: no audible wheeze, cough, or visible cyanosis.  No visible retractions or increased work of breathing.  Able to speak fully in complete sentences.  NEURO: Cranial nerves grossly intact, mentation intact and speech normal  PSYCH: mentation appears normal, affect normal/bright, judgement and insight intact, normal speech and appearance well-groomed  ABD: soft nontender nondistended  : Normal external female genitalia.  She has some fullness at the urethral but no obvious lesion today    Urinalysis 3/31/2022 negative-non RBC, 0-5 WBC    CC  Patient Care Team:  Lui Bassett MD as PCP - General (Family Medicine)  Juan Nix NP as Nurse Practitioner (Family Practice)  Peggy Dow MD as MD (Urology)  Taylor Lowery MD as Assigned PCP  TAYLOR LOWERY      "

## 2022-04-28 NOTE — PROGRESS NOTES
iApril 28, 2022    Referring Provider: Eloise Lowery MD  [address] [place] [address]    Primary Care Provider: Lui Bassett    Assessment & Plan     Urethral lesion  Unclear etiology, appears to have improved and is not bothersome.  Discussed options of observation, pelvic MRI and cystoscopy.  At this time wishes to observe.  Will return for repeat exam      Mixed incontinence  We discussed the etiologies of stress and urge incontinence and how they differ. We discussed that the options of treating stress incontinence to include observation, weight loss, pelvic floor physical therapy, incontinence pessary, urethral bulking agents, and surgical correction most commonly with midurethral sling or autologous rectus fascial sling. We then discussed that urge incontinence treatments include observation, weight loss, medications most commonly anticholinergics, physical therapy, biofeedback, intravesical botulinum toxin, percutaneous tibial nerve stimulation and sacral neuromodulation.     Wishes to start with PT. Discussed how this works and she is agreeable    - Physical Therapy Referral; Future      Return in about 6 months (around 10/28/2022) for in person.    28 minutes were spent on this day of the encounter in reviewing the EMR including reviewing Dr Lowery notes 3/31/22 as well as urinalysis from 3/31/22, direct patient care, coordination of care and documentation    Peggy Dow MD MPH  (she/her/hers)   of Urology  [facility]      HPI:  Isiah Reina is a 41 year old female who presents for evaluation of her pelvic floor symptoms.      She recently was evaluated at her PCP's office and was found on exam to have a swollen, erythematous and irritated urethral.  She denies pain, gross hematuria, abnormal vaginal bleeding, vaginal bulge    She has mixed incontinence    She has has one pregnancy via IVF.  Still has embryo left and considering another  pregnancy    Does not know family history as she is adopted    Past Medical History:   Diagnosis Date     Allergic rhinitis      Depression     During grad school only, was situational. She received counseling weekly for a period of time. No suicidal ideation. No medicaiton needed.     Depressive disorder 2005     Hx of previous reproductive problem 2017    3 medicated IUIs, 2 rounds of IVF, 2 FETs, 1 chemical pregn     Rectal bleeding     had colonoscopy all normal, no recurrence     Right wrist fracture 2014     Past Surgical History:   Procedure Laterality Date     COLONOSCOPY  ~10 years ago     ivf   egg retreival      times 2     Social History     Socioeconomic History     Marital status:      Spouse name: Brady Dreyer     Number of children: 0     Years of education: Graduate     Highest education level: Not on file   Occupational History     Occupation: marketing     Employer: [employer]   Tobacco Use     Smoking status: Never Smoker     Smokeless tobacco: Never Used   Substance and Sexual Activity     Alcohol use: Not Currently     Alcohol/week: 1.0 standard drink     Comment: stopped in ifv   and pregnancy     Drug use: No     Sexual activity: Not Currently     Partners: Male     Birth control/protection: None     Comment:    Other Topics Concern     Parent/sibling w/ CABG, MI or angioplasty before 65F 55M? No      Service No     Blood Transfusions No     Caffeine Concern No     Comment: 1cpd     Occupational Exposure No     Hobby Hazards No     Sleep Concern No     Comment: Worse recently becasue of buying a house.     Stress Concern No     Weight Concern No     Comment: Gained weight in the past year, maybe stress related.     Special Diet No     Back Care No     Exercise Yes     Comment: 5-6 times running, weights once weekly     Bike Helmet Not Asked     Comment: NA     Seat Belt Yes     Self-Exams No   Social History Narrative    Moved from [place] two years ago.  .  No  "pets.  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 Undergrad in [place].    [employer] grad school in Chemistry. Baking is her hobby. Has friends at work and gym but is still establishing relationships.     Feels safe in environments.  Bought a house in [place].  travels a lot, is an .     YUKI Nix RN, MS,        How much exercise per week? 4-5 times week     How much calcium per day? prenatal       How much caffeine per day? 0    How much vitamin D per day? supplements    Do you/your family wear seatbelts?  Yes    Do you/your family use safety helmets? Yes    Do you/your family use sunscreen? Yes    Do you/your family keep firearms in the home? No    Do you/your family have a smoke detector(s)? Yes    Reviewed [facility] 3-         Social Determinants of Health     Financial Resource Strain: Not on file   Food Insecurity: Not on file   Transportation Needs: Not on file   Physical Activity: Not on file   Stress: Not on file   Social Connections: Not on file   Intimate Partner Violence: Not on file   Housing Stability: Not on file     Family History   Adopted: Yes     ROS    Allergies   Allergen Reactions     Ibuprofen Anaphylaxis     Current Outpatient Medications   Medication     betamethasone dipropionate (DIPROSONE) 0.05 % external lotion     EPINEPHrine (ANY BX GENERIC EQUIV) 0.3 MG/0.3ML injection 2-pack     fexofenadine (ALLEGRA) 180 MG tablet     fluticasone (FLONASE) 50 MCG/ACT nasal spray     guaiFENesin (MUCINEX) 600 MG 12 hr tablet     ipratropium (ATROVENT) 0.03 % nasal spray     ketoconazole (NIZORAL) 2 % external shampoo     montelukast (SINGULAIR) 10 MG tablet     zolpidem ER (AMBIEN CR) 6.25 MG CR tablet     azelastine (ASTELIN) 0.1 % nasal spray     No current facility-administered medications for this visit.   /78   Pulse 62   Ht 1.651 m (5' 5\")   Wt 83.5 kg (184 lb)   LMP 03/10/2022 (Exact Date)   BMI 30.62 " kg/m    GENERAL: healthy, alert and no distress  EYES: Eyes grossly normal to inspection, conjunctivae and sclerae normal  HENT: normal cephalic/atraumatic.  External ears, nose and mouth without ulcers or lesions.  RESP: no audible wheeze, cough, or visible cyanosis.  No visible retractions or increased work of breathing.  Able to speak fully in complete sentences.  NEURO: Cranial nerves grossly intact, mentation intact and speech normal  PSYCH: mentation appears normal, affect normal/bright, judgement and insight intact, normal speech and appearance well-groomed  ABD: soft nontender nondistended  : Normal external female genitalia.  She has some fullness at the urethral but no obvious lesion today    Urinalysis 3/31/2022 negative-non RBC, 0-5 WBC    CC  Patient Care Team:  Lui Bassett MD as PCP - General (Family Medicine)  Juan Nix NP as Nurse Practitioner (Family Practice)  Peggy Dow MD as MD (Urology)  Taylor Lowery MD as Assigned PCP  TAYLOR LOWERY

## 2022-04-28 NOTE — PATIENT INSTRUCTIONS
Websites with free information:    [employer] patient website: www.voicesforpfd.org    Total Control Program: www.totalcontrolprogram.com    Please see one of the dedicated pelvic floor physical therapist ([facility] [phone])    Please return to see me in 6 months, sooner if needed    It was a pleasure meeting with you today.  Thank you for allowing me and my team the privilege of caring for you today.  YOU are the reason we are here, and I truly hope we provided you with the excellent service you deserve.  Please let us know if there is anything else we can do for you so that we can be sure you are leaving completely satisfied with your care experience.

## 2022-04-28 NOTE — NURSING NOTE
"Chief Complaint   Patient presents with     Consult     Urethral redness       Blood pressure 113/78, pulse 62, height 1.651 m (5' 5\"), weight 83.5 kg (184 lb), last menstrual period 03/10/2022, not currently breastfeeding. Body mass index is 30.62 kg/m .    Patient Active Problem List   Diagnosis     Seasonal allergic rhinitis       Allergies   Allergen Reactions     Ibuprofen Anaphylaxis       Current Outpatient Medications   Medication Sig Dispense Refill     betamethasone dipropionate (DIPROSONE) 0.05 % external lotion PLEASE SEE ATTACHED FOR DETAILED DIRECTIONS       EPINEPHrine (ANY BX GENERIC EQUIV) 0.3 MG/0.3ML injection 2-pack USE 1 TO 2 INJECTIONS AS NEEDED FOR ALLERGIC REACTION AS DIRECTED       fexofenadine (ALLEGRA) 180 MG tablet        fluticasone (FLONASE) 50 MCG/ACT nasal spray        guaiFENesin (MUCINEX) 600 MG 12 hr tablet Take 2 tablets (1,200 mg) by mouth 2 times daily 12 tablet 0     ipratropium (ATROVENT) 0.03 % nasal spray Spray 2 sprays into both nostrils 3 times daily 30 mL 0     ketoconazole (NIZORAL) 2 % external shampoo WASH AFFECTED AREA ON SCALP & FACE 2-3 TIMES WEEKLY IN SHOWER. LATHER, LET SIT 2-3 MINS THEN RINSE       montelukast (SINGULAIR) 10 MG tablet Take 1 tablet (10 mg) by mouth At Bedtime 90 tablet 3     zolpidem ER (AMBIEN CR) 6.25 MG CR tablet Take 1 tablet (6.25 mg) by mouth nightly as needed for sleep 21 tablet 0     azelastine (ASTELIN) 0.1 % nasal spray INSTILL 1 SPRAY IN EACH NOSTRIL TWICE A DAY (Patient not taking: Reported on 4/28/2022)         Social History     Tobacco Use     Smoking status: Never Smoker     Smokeless tobacco: Never Used   Substance Use Topics     Alcohol use: Not Currently     Alcohol/week: 1.0 standard drink     Comment: stopped in ifv   and pregnancy     Drug use: Regina Ramos CMA  4/28/2022  7:27 AM     "

## 2022-04-29 DIAGNOSIS — J30.2 SEASONAL ALLERGIC RHINITIS, UNSPECIFIED TRIGGER: ICD-10-CM

## 2022-05-05 RX ORDER — IPRATROPIUM BROMIDE 21 UG/1
2 SPRAY, METERED NASAL 3 TIMES DAILY
OUTPATIENT
Start: 2022-05-05

## 2022-05-26 ENCOUNTER — THERAPY VISIT (OUTPATIENT)
Dept: PHYSICAL THERAPY | Facility: CLINIC | Age: 42
End: 2022-05-26
Attending: UROLOGY
Payer: COMMERCIAL

## 2022-05-26 DIAGNOSIS — N39.46 MIXED INCONTINENCE: ICD-10-CM

## 2022-05-26 DIAGNOSIS — M62.89 PELVIC FLOOR DYSFUNCTION: ICD-10-CM

## 2022-05-26 PROCEDURE — 97112 NEUROMUSCULAR REEDUCATION: CPT | Mod: GP | Performed by: PHYSICAL THERAPIST

## 2022-05-26 PROCEDURE — 97530 THERAPEUTIC ACTIVITIES: CPT | Mod: GP | Performed by: PHYSICAL THERAPIST

## 2022-05-26 PROCEDURE — 97161 PT EVAL LOW COMPLEX 20 MIN: CPT | Mod: GP | Performed by: PHYSICAL THERAPIST

## 2022-05-26 PROCEDURE — 97535 SELF CARE MNGMENT TRAINING: CPT | Mod: GP | Performed by: PHYSICAL THERAPIST

## 2022-05-26 NOTE — PROGRESS NOTES
"Physical Therapy Initial Evaluation  Subjective:    Patient Health History  Isaih Reina being seen for Pelvic floor pt.     Problem began: 10/25/2019.   Problem occurred: My daughter was born 10/[birth year]19   Pain is reported as 0/10 on pain scale.  General health as reported by patient is good.  Pertinent medical history includes: none.        Surgeries include:  Other. Other surgery history details: Ivf.    Current medications:  Other. Other medications details: Allergy.    Current occupation is Marketing.   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                   History of current episode:    Onset date/MD order: Mixed incontinence, urethral lesion 22.    CC/Present symptoms: Pt reports that she has seen about four pelvic floor physical therapists since giving birth in .  She wanted to get back in to running and was having massive leakage.  She went back to doing the previous exercises and did not see an improvement.  She tried another PT and tried more sessions with no improvement.  She did a running assessment and did not have any improvement.  She started working on some core and hip strength.  She was wearing a small tampon while running and had minimal leakage, very happy to not have big leakages.  She started a program called \"get mom strong\"  Off an on and feels like she has hit or miss issues.  She had covid in January, coughed her way through that and had no issues.  She saw Dr. Dow,  For urethral lesion.  She reports that she still has random leakage with coughing or sneezing and sometimes decelerating with run.  Leakage doesn't happen every time with run or cough/sneeze.  Tries to workout 3-5 times per week, usually twice per week while running.    Jumping is fine, no leakage but she did used to have urinary leakage with this activity.  Runs on treadmill in winter, wears tampon occasionally for support and notices less leakage.     HPI/SMHx/Childbirth hx::, 10 " pound baby vaginal delivery.  She did have urinary urgency in the last trimester.  Third degree tear with delivery.    Pain rating (0-10): 0/10  Conditioning is improving/unchanging/worsening: unchanging    Hx of or present sexually transmitted disease:n/a  Current occupation: Marketing  Current activity: Run and strength program   Goals: Eliminate occasional incontinence  Red flags:none    Urination:  Do you leak on the way to the bathroom or with a strong urge to void? no   Do you leak with cough,sneeze, jumping, running? sometimes  Any other activities that cause leaking?  no  Do you have triggers that make you feel you can't wait to go to the bathroom?  What are they? no.  Type of pad and number used per day? pantyliner 1 per day  When you leak what is the amount? Small-medium  How long can you delay the need to urinate? Not sure, depends on hydration? 1-2 hours.   Do you feel excessive pressure in pelvic floor: No .  When?   Frequency of daytime urination:once every couple of hours  Frequency of nighttime urination:0  Can you stop the flow of urine when on the toilet? yes  Is the volume of urine passed usually:  (8sec rule= 250ml with average bladder storing 400-600ml) average  Do you strain to pass urine? no  Feels like she empties fully   Do you have a slow or hesitant urinary stream? no  Do you have difficulty initiating the urine stream? no  Is urination painful? no  How many bladder infections have you had in last 12 months?0  Fluid intake(one glass is 8oz or one cup) 6-8glasses/day, 0-1caffinated glasses/day  0 alcohol glasses/day.  Bowel habits:  Frequency of bowel movements? 1 times a day  Consistancy of stool?  [place] Stool Scale depends on hydration  Do you ignore the urge to defecate? no  Do you strain to pass stool? no  Pelvic Pain:  Do you have any pelvic pain with intercourse, exams, use of tampons? n/a  Is initial penetration during intercourse painful? no  Is deeper penetration painful? no  Do  you use lubricant?  yes  Are you sexually active? yes  Have you ever been worried for your physical safety? no  Have you practiced the PF(kegel) exercises for 4 or more weeks?in the past, but not currently  Marinoff Scale:Level n/a  (Level 3: Abstinence from intercourse because of severe pain. Level 2: Painful intercourse which limites frequency of activity. Level 1: Painful intercourse not severe enough to prevent activity.)    OBJECTIVE:      Treatment/Education provided this session: please see flow sheet for details    ASSESSMENT/PLAN:    Patient is a 42 year old female with urinary incontinence complaints.    Patient has the following significant findings with corresponding treatment plan.                Diagnosis 1:  Pelvic floor dysfunction -  manual therapy, self management, education, directional preference exercise and home program  Decreased ROM/flexibility - manual therapy and therapeutic exercise  Decreased joint mobility - manual therapy and therapeutic exercise  Decreased strength - therapeutic exercise and therapeutic activities  Decreased proprioception - neuro re-education and therapeutic activities  Impaired gait - gait training  Impaired muscle performance - neuro re-education  Decreased function - therapeutic activities  Impaired posture - neuro re-education    Previous and current functional limitations:  (See Goal Flow Sheet for this information)    Short term and Long term goals: (See Goal Flow Sheet for this information)     Communication ability:  Patient appears to be able to clearly communicate and understand verbal and written communication and follow directions correctly.  Treatment Explanation - The following has been discussed with the patient:   RX ordered/plan of care  Anticipated outcomes  Possible risks and side effects  This patient would benefit from PT intervention to resume normal activities.   Rehab potential is good.    Frequency:  1X week, once daily  Duration:  for 3  weeks, tapering to 2x/month for 5 weeks  Discharge Plan:  Achieve all LTG.  Independent in home treatment program.  Reach maximal therapeutic benefit.    Please refer to the daily flowsheet for treatment today, total treatment time and time spent performing 1:1 timed codes.                  Objective:  System         Lumbar/SI Evaluation  ROM:  AROM Lumbar: normal  AROM Lumbar:   Flexion:        WNL  Ext:                    WNL   Side Bend:        Left:     Right:   Rotation:           Left:     Right:   Side Glide:        Left:     Right:                                                     Pelvic Dysfunction Evaluation:        Flexibility:    Tightness present at:Hamstrings and Piriformis    Abdominal Wall:  Abdominal wall pelvic: rib flare in supine, also noted with TrA contraction but improved with cues for exhale and drawing ribs down and in with contraction.  Diastasis Recti:  Normal  Trigger Points:  Transverse abdominals    Pelvic Clock Exam:    Ischiocavernosis pain:  -  Bulbocavernosis pain:  -  Transverse Perineal:  +  Levator ANI:  ++  Perineal Body:  -      External Assessment:  External assessment pelvic: irritation at urethral level, difficult to assess prolapse due to inability to get bear down.  Skin Condition:  Irritation  Scars:  Well healed  Bearing Down/Coughing:  Normal  Tissue Symmetry:  Normal  Introitus:  Normal  Muscle Contraction/Perineal Mobility:  Slight lift, no urogential triangle descent  Internal Assessment:    Sensory Exam:  Normal  Contraction/Grade:  Fair squeeze, definite lift (3)  Accessory Muscle use-Abdominals:  Use of abdominals ~50% of time         SEMG Biofeedback:        Suraface electrode placement--Perianal:  2-3 uV in supine, 4-5 uV in sitting with signal instability indicating overactivity            Position:  Sitting          Hip Evaluation  HIP AROM:  AROM:    Left Hip:     Normal    Right Hip:   Normal                    Hip Strength:  Hip Strength:    Left:     Normal  Right:  Normal                                         General     ROS

## 2022-05-27 PROBLEM — M62.89 PELVIC FLOOR DYSFUNCTION: Status: ACTIVE | Noted: 2022-05-27

## 2022-06-15 ENCOUNTER — THERAPY VISIT (OUTPATIENT)
Dept: PHYSICAL THERAPY | Facility: CLINIC | Age: 42
End: 2022-06-15
Payer: COMMERCIAL

## 2022-06-15 DIAGNOSIS — M62.89 PELVIC FLOOR DYSFUNCTION: Primary | ICD-10-CM

## 2022-06-15 PROCEDURE — 97110 THERAPEUTIC EXERCISES: CPT | Mod: GP | Performed by: PHYSICAL THERAPIST

## 2022-06-15 PROCEDURE — 97112 NEUROMUSCULAR REEDUCATION: CPT | Mod: GP | Performed by: PHYSICAL THERAPIST

## 2022-06-15 PROCEDURE — 97530 THERAPEUTIC ACTIVITIES: CPT | Mod: GP | Performed by: PHYSICAL THERAPIST

## 2022-09-11 ENCOUNTER — HEALTH MAINTENANCE LETTER (OUTPATIENT)
Age: 42
End: 2022-09-11

## 2022-10-19 ENCOUNTER — ALLIED HEALTH/NURSE VISIT (OUTPATIENT)
Dept: FAMILY MEDICINE | Facility: CLINIC | Age: 42
End: 2022-10-19
Payer: COMMERCIAL

## 2022-10-19 VITALS — TEMPERATURE: 97.4 F

## 2022-10-19 DIAGNOSIS — Z23 NEED FOR VACCINATION: Primary | ICD-10-CM

## 2022-10-19 NOTE — NURSING NOTE
"Injectable Influenza Immunization Documentation    1.  Has the patient received the information for the injectable influenza vaccine? YES     2. Is the patient 6 months of age or older? YES     3. Does the patient have any of the following contraindications?         Severe allergy to eggs? No     Severe allergic reaction to previous influenza vaccines? No   Severe allergy to latex? No       History of Guillain-Mathews syndrome? No     Currently have a temperature greater than 100.4F? No        4.  Severely egg allergic patients should have flu vaccine eligibility assessed by an MD, RN, or pharmacist, and those who received flu vaccine should be observed for 15 min by an MD, RN, Pharmacist, Medical Technician, or member of clinic staff.\": YES    5. Latex-allergic patients should be given latex-free influenza vaccine Yes. Please reference the Vaccine latex table to determine if your clinic s product is latex-containing.       Vaccination given by Stefany Deluna CMA          "

## 2022-10-24 ENCOUNTER — PRE VISIT (OUTPATIENT)
Dept: UROLOGY | Facility: CLINIC | Age: 42
End: 2022-10-24

## 2022-10-24 NOTE — TELEPHONE ENCOUNTER
Reason for visit: PFPT follow up     Relevant information: urethral lesion, mixed incontinence    Records/imaging/labs/orders: all records available    Pt called: No need for a call    At Rooming: Cuba Ramos CMA  10/24/2022  9:58 AM

## 2022-10-27 ENCOUNTER — OFFICE VISIT (OUTPATIENT)
Dept: UROLOGY | Facility: CLINIC | Age: 42
End: 2022-10-27
Payer: COMMERCIAL

## 2022-10-27 VITALS
SYSTOLIC BLOOD PRESSURE: 128 MMHG | HEIGHT: 64 IN | WEIGHT: 185 LBS | BODY MASS INDEX: 31.58 KG/M2 | DIASTOLIC BLOOD PRESSURE: 85 MMHG | HEART RATE: 73 BPM

## 2022-10-27 DIAGNOSIS — N39.46 MIXED INCONTINENCE: Primary | ICD-10-CM

## 2022-10-27 DIAGNOSIS — N36.9 URETHRAL LESION: ICD-10-CM

## 2022-10-27 PROCEDURE — 99212 OFFICE O/P EST SF 10 MIN: CPT | Performed by: UROLOGY

## 2022-10-27 RX ORDER — PREDNISONE 10 MG/1
TABLET ORAL
COMMUNITY
Start: 2022-09-26 | End: 2023-10-10

## 2022-10-27 RX ORDER — PROGESTERONE 100 MG/1
INSERT VAGINAL
COMMUNITY
Start: 2022-09-23 | End: 2023-10-10

## 2022-10-27 RX ORDER — ESTRADIOL 0.1 MG/D
FILM, EXTENDED RELEASE TRANSDERMAL
COMMUNITY
Start: 2022-09-28 | End: 2023-10-10

## 2022-10-27 RX ORDER — DOXYCYCLINE HYCLATE 100 MG
100 TABLET ORAL 2 TIMES DAILY
COMMUNITY
Start: 2022-09-26 | End: 2023-10-10

## 2022-10-27 NOTE — NURSING NOTE
"Chief Complaint   Patient presents with     Follow Up     PFPT follow up       Blood pressure 128/85, pulse 73, height 1.626 m (5' 4\"), weight 83.9 kg (185 lb), not currently breastfeeding. Body mass index is 31.76 kg/m .    Patient Active Problem List   Diagnosis     Seasonal allergic rhinitis     Pelvic floor dysfunction       Allergies   Allergen Reactions     Ibuprofen Anaphylaxis       Current Outpatient Medications   Medication Sig Dispense Refill     azelastine (ASTELIN) 0.1 % nasal spray        doxycycline hyclate (VIBRA-TABS) 100 MG tablet Take 100 mg by mouth 2 times daily       ENDOMETRIN 100 MG vaginal insert        EPINEPHrine (ANY BX GENERIC EQUIV) 0.3 MG/0.3ML injection 2-pack USE 1 TO 2 INJECTIONS AS NEEDED FOR ALLERGIC REACTION AS DIRECTED       estradiol (VIVELLE-DOT) 0.1 MG/24HR bi-weekly patch APPLY TO SKIN EVERY OTHER DAY 1-4 PATCHES AS DIRECTED       fexofenadine (ALLEGRA) 180 MG tablet        fluticasone (FLONASE) 50 MCG/ACT nasal spray        guaiFENesin (MUCINEX) 600 MG 12 hr tablet Take 2 tablets (1,200 mg) by mouth 2 times daily 12 tablet 0     montelukast (SINGULAIR) 10 MG tablet Take 1 tablet (10 mg) by mouth At Bedtime 90 tablet 3     predniSONE (DELTASONE) 10 MG tablet TAKE 1 TABLET BY MOUTH DAILY AS DIRECTED       zolpidem ER (AMBIEN CR) 6.25 MG CR tablet Take 1 tablet (6.25 mg) by mouth nightly as needed for sleep 21 tablet 0     betamethasone dipropionate (DIPROSONE) 0.05 % external lotion PLEASE SEE ATTACHED FOR DETAILED DIRECTIONS (Patient not taking: Reported on 10/27/2022)       ipratropium (ATROVENT) 0.03 % nasal spray Spray 2 sprays into both nostrils 3 times daily (Patient not taking: Reported on 10/27/2022) 30 mL 0     ketoconazole (NIZORAL) 2 % external shampoo WASH AFFECTED AREA ON SCALP & FACE 2-3 TIMES WEEKLY IN SHOWER. LATHER, LET SIT 2-3 MINS THEN RINSE (Patient not taking: Reported on 10/27/2022)         Social History     Tobacco Use     Smoking status: Never     " Smokeless tobacco: Never   Substance Use Topics     Alcohol use: Not Currently     Alcohol/week: 1.0 standard drink     Comment: stopped in ifv   and pregnancy     Drug use: No       Gloria Cui, EMT  10/27/2022  11:47 AM

## 2022-10-27 NOTE — PROGRESS NOTES
"October 27, 2022    Isiah was seen today for follow up.    Diagnoses and all orders for this visit:    Mixed incontinence    Urethral lesion       At this time she is stable with her symptoms    She will be undergoing IVF this weekend    RTC 6 months, sooner if needed or will push out farther if IVF successful    10 minutes were spent today on the day of the encounter in reviewing the EMR, direct patient care, coordination of care and documentation    Peggy Dow MD MPH  (she/her/hers)   of Urology  [facility]      Subjective    Leakage better, summer was good.  Did have some leakage with running in the cold recently  She denies any changes in health since last visit    /85   Pulse 73   Ht 1.626 m (5' 4\")   Wt 83.9 kg (185 lb)   BMI 31.76 kg/m    GENERAL: healthy, alert and no distress  EYES: Eyes grossly normal to inspection, conjunctivae and sclerae normal  HENT: normal cephalic/atraumatic.  External ears, nose and mouth without ulcers or lesions.  RESP: no audible wheeze, cough, or visible cyanosis.  No visible retractions or increased work of breathing.  Able to speak fully in complete sentences.  NEURO: Cranial nerves grossly intact, mentation intact and speech normal  PSYCH: mentation appears normal, affect normal/bright, judgement and insight intact, normal speech and appearance well-groomed  : Normal external female genitalia.  Pelvic exam unremarkable    CC  Patient Care Team:  Lui Bassett MD as PCP - General (Family Medicine)  Juan Nix, NP as Nurse Practitioner (Family Practice)  Peggy Dow MD as MD (Urology)  Eloise Lowery MD as Assigned PCP  Peggy Dow MD as Assigned Surgical Provider  SELF, REFERRED        "

## 2022-10-27 NOTE — PATIENT INSTRUCTIONS
Good luck with your procedure on Sunday    Websites with free information:    American [employer] patient website: www.voicesforpfd.org    Total Control Program: www.totalcontrolprogram.com    It was a pleasure meeting with you today.  Thank you for allowing me and my team the privilege of caring for you today.  YOU are the reason we are here, and I truly hope we provided you with the excellent service you deserve.  Please let us know if there is anything else we can do for you so that we can be sure you are leaving completely satisfied with your care experience.

## 2022-10-27 NOTE — LETTER
"10/27/2022       RE: Isiah Reina  [address]  [place] [address]     Dear Colleague,    Thank you for referring your patient, Isiah Reina, to the [facility] [place] at [facility]. Please see a copy of my visit note below.    October 27, 2022    Isiah was seen today for follow up.    Diagnoses and all orders for this visit:    Mixed incontinence    Urethral lesion       At this time she is stable with her symptoms    She will be undergoing IVF this weekend    RTC 6 months, sooner if needed or will push out farther if IVF successful    10 minutes were spent today on the day of the encounter in reviewing the EMR, direct patient care, coordination of care and documentation    Peggy Dow MD MPH  (she/her/hers)   of Urology  [facility]      Subjective    Leakage better, summer was good.  Did have some leakage with running in the cold recently  She denies any changes in health since last visit    /85   Pulse 73   Ht 1.626 m (5' 4\")   Wt 83.9 kg (185 lb)   BMI 31.76 kg/m    GENERAL: healthy, alert and no distress  EYES: Eyes grossly normal to inspection, conjunctivae and sclerae normal  HENT: normal cephalic/atraumatic.  External ears, nose and mouth without ulcers or lesions.  RESP: no audible wheeze, cough, or visible cyanosis.  No visible retractions or increased work of breathing.  Able to speak fully in complete sentences.  NEURO: Cranial nerves grossly intact, mentation intact and speech normal  PSYCH: mentation appears normal, affect normal/bright, judgement and insight intact, normal speech and appearance well-groomed  : Normal external female genitalia.  Pelvic exam unremarkable    CC  Patient Care Team:  Lui Bassett MD as PCP - General (Family Medicine)  Juan Nix NP as Nurse Practitioner (Family Practice)  Peggy Dow MD as MD (Urology)  Sebastián, " Eloise Jaimes MD as Assigned PCP  Victor M, Peggy Chavez MD as Assigned Surgical Provider  SELF, REFERRED

## 2022-12-09 ENCOUNTER — TRANSFERRED RECORDS (OUTPATIENT)
Dept: HEALTH INFORMATION MANAGEMENT | Facility: CLINIC | Age: 42
End: 2022-12-09

## 2022-12-12 ENCOUNTER — TRANSCRIBE ORDERS (OUTPATIENT)
Dept: MATERNAL FETAL MEDICINE | Facility: CLINIC | Age: 42
End: 2022-12-12

## 2022-12-12 ENCOUNTER — MEDICAL CORRESPONDENCE (OUTPATIENT)
Dept: HEALTH INFORMATION MANAGEMENT | Facility: CLINIC | Age: 42
End: 2022-12-12

## 2022-12-12 DIAGNOSIS — O26.90 PREGNANCY RELATED CONDITION, ANTEPARTUM: Primary | ICD-10-CM

## 2023-02-09 ENCOUNTER — PRE VISIT (OUTPATIENT)
Dept: MATERNAL FETAL MEDICINE | Facility: CLINIC | Age: 43
End: 2023-02-09
Payer: COMMERCIAL

## 2023-02-16 ENCOUNTER — OFFICE VISIT (OUTPATIENT)
Dept: MATERNAL FETAL MEDICINE | Facility: CLINIC | Age: 43
End: 2023-02-16
Attending: OBSTETRICS & GYNECOLOGY
Payer: COMMERCIAL

## 2023-02-16 ENCOUNTER — HOSPITAL ENCOUNTER (OUTPATIENT)
Dept: ULTRASOUND IMAGING | Facility: CLINIC | Age: 43
Discharge: HOME | End: 2023-02-16
Attending: OBSTETRICS & GYNECOLOGY
Payer: COMMERCIAL

## 2023-02-16 DIAGNOSIS — O09.812 PREGNANCY RESULTING FROM IN VITRO FERTILIZATION IN SECOND TRIMESTER: ICD-10-CM

## 2023-02-16 DIAGNOSIS — O09.522 MULTIGRAVIDA OF ADVANCED MATERNAL AGE IN SECOND TRIMESTER: Primary | ICD-10-CM

## 2023-02-16 DIAGNOSIS — O26.90 PREGNANCY RELATED CONDITION, ANTEPARTUM: ICD-10-CM

## 2023-02-16 DIAGNOSIS — Z36.2 ENCOUNTER FOR FOLLOW-UP ULTRASOUND OF FETAL ANATOMY: ICD-10-CM

## 2023-02-16 PROCEDURE — 76811 OB US DETAILED SNGL FETUS: CPT | Mod: 26 | Performed by: OBSTETRICS & GYNECOLOGY

## 2023-02-16 PROCEDURE — 76811 OB US DETAILED SNGL FETUS: CPT

## 2023-02-16 PROCEDURE — 99213 OFFICE O/P EST LOW 20 MIN: CPT | Mod: 25 | Performed by: OBSTETRICS & GYNECOLOGY

## 2023-02-16 NOTE — NURSING NOTE
Patient presents to [facility] for L2. Denies LOF, vaginal bleeding, cramping/contractions. SBAR given to [facility] MD, see their note in Epic.

## 2023-02-16 NOTE — PROGRESS NOTES
The patient was seen for an ultrasound in the [facility] at the [facility] today.  For a detailed report of the ultrasound examination, please see the ultrasound report which can be found under the imaging tab.    Tanya Calixto MD  , OB/GYN  Maternal-Fetal Medicine  [phone] (Pager)

## 2023-03-24 ENCOUNTER — OFFICE VISIT (OUTPATIENT)
Dept: MATERNAL FETAL MEDICINE | Facility: CLINIC | Age: 43
End: 2023-03-24
Attending: OBSTETRICS & GYNECOLOGY
Payer: COMMERCIAL

## 2023-03-24 ENCOUNTER — HOSPITAL ENCOUNTER (OUTPATIENT)
Dept: ULTRASOUND IMAGING | Facility: CLINIC | Age: 43
Discharge: HOME | End: 2023-03-24
Attending: OBSTETRICS & GYNECOLOGY
Payer: COMMERCIAL

## 2023-03-24 DIAGNOSIS — O26.90 PREGNANCY RELATED CONDITION, ANTEPARTUM: ICD-10-CM

## 2023-03-24 DIAGNOSIS — O09.522 MULTIGRAVIDA OF ADVANCED MATERNAL AGE IN SECOND TRIMESTER: ICD-10-CM

## 2023-03-24 DIAGNOSIS — O09.812 PREGNANCY RESULTING FROM IN VITRO FERTILIZATION IN SECOND TRIMESTER: ICD-10-CM

## 2023-03-24 DIAGNOSIS — Z36.2 ENCOUNTER FOR FOLLOW-UP ULTRASOUND OF FETAL ANATOMY: Primary | ICD-10-CM

## 2023-03-24 DIAGNOSIS — Z36.2 ENCOUNTER FOR FOLLOW-UP ULTRASOUND OF FETAL ANATOMY: ICD-10-CM

## 2023-03-24 PROCEDURE — 93325 DOPPLER ECHO COLOR FLOW MAPG: CPT

## 2023-03-24 PROCEDURE — 76825 ECHO EXAM OF FETAL HEART: CPT

## 2023-03-24 PROCEDURE — 93325 DOPPLER ECHO COLOR FLOW MAPG: CPT | Mod: 26 | Performed by: OBSTETRICS & GYNECOLOGY

## 2023-03-24 PROCEDURE — 76816 OB US FOLLOW-UP PER FETUS: CPT

## 2023-03-24 PROCEDURE — 76827 ECHO EXAM OF FETAL HEART: CPT | Mod: 26 | Performed by: OBSTETRICS & GYNECOLOGY

## 2023-03-24 PROCEDURE — 76816 OB US FOLLOW-UP PER FETUS: CPT | Mod: 26 | Performed by: OBSTETRICS & GYNECOLOGY

## 2023-03-24 PROCEDURE — 76825 ECHO EXAM OF FETAL HEART: CPT | Mod: 26 | Performed by: OBSTETRICS & GYNECOLOGY

## 2023-04-30 ENCOUNTER — HEALTH MAINTENANCE LETTER (OUTPATIENT)
Age: 43
End: 2023-04-30

## 2023-08-08 NOTE — NURSING NOTE
Screening Questionnaire for Adult Immunization    Are you sick today?   No   Do you have allergies to medications, food, a vaccine component or latex?   No   Have you ever had a serious reaction after receiving a vaccination?   No   Do you have a long-term health problem with heart disease, lung disease, asthma, kidney disease, metabolic disease (e.g. diabetes), anemia, or other blood disorder?   No   Do you have cancer, leukemia, HIV/AIDS, or any other immune system problem?   No   In the past 3 months, have you taken medications that affect  your immune system, such as prednisone, other steroids, or anticancer drugs; drugs for the treatment of rheumatoid arthritis, Crohn s disease, or psoriasis; or have you had radiation treatments?   No   Have you had a seizure, or a brain or other nervous system problem?   No   During the past year, have you received a transfusion of blood or blood     products, or been given immune (gamma) globulin or antiviral drug?   No   For women: Are you pregnant or is there a chance you could become        pregnant during the next month?   No   Have you received any vaccinations in the past 4 weeks?   No     Immunization questionnaire answers were all negative.        Per orders of EVELYN Caraballo, injection of #2 MMR and Varicella given by Mela Lambert. Patient instructed to remain in clinic for 15 minutes afterwards, and to report any adverse reaction to me immediately.       Screening performed by Mela Lambert on 10/22/2018 at 3:45 PM.     fall precautions

## 2023-10-10 ENCOUNTER — OFFICE VISIT (OUTPATIENT)
Dept: UROLOGY | Facility: CLINIC | Age: 43
End: 2023-10-10
Payer: COMMERCIAL

## 2023-10-10 VITALS — SYSTOLIC BLOOD PRESSURE: 130 MMHG | OXYGEN SATURATION: 98 % | DIASTOLIC BLOOD PRESSURE: 85 MMHG | HEART RATE: 62 BPM

## 2023-10-10 DIAGNOSIS — N39.46 MIXED INCONTINENCE: Primary | ICD-10-CM

## 2023-10-10 DIAGNOSIS — N36.9 URETHRAL LESION: ICD-10-CM

## 2023-10-10 PROCEDURE — 99212 OFFICE O/P EST SF 10 MIN: CPT | Performed by: UROLOGY

## 2023-10-10 ASSESSMENT — PAIN SCALES - GENERAL: PAINLEVEL: NO PAIN (0)

## 2023-10-10 NOTE — LETTER
10/10/2023       RE: Isiah Reina  [address] [place] [address]     Dear Colleague,    Thank you for referring your patient, Isiah Riena, to the [facility] [place] at [facility]. Please see a copy of my visit note below.    October 10, 2023    Isiah was seen today for urethral lesion.    Diagnoses and all orders for this visit:    Mixed incontinence    Urethral lesion     At this time she is only 3 months post partum and has already started PT.  Discussed that we can revisit things in 6 months, sooner if needed    5 minutes were spent today on the day of the encounter in reviewing the EMR, direct patient care, coordination of care and documentation    Peggy Dow MD MPH  (she/her/hers)   of Urology  [facility]      Subjective    She is here today for follow up.  Last seen about a year ago.  She had a baby girl 3 months ago, vaginal delivery.    Just started pelvic floor PT at [facility].      She denies any other changes in health since last visit    /85   Pulse 62   LMP 03/10/2022 (Exact Date)   SpO2 98%   GENERAL: healthy, alert and no distress  EYES: Eyes grossly normal to inspection, conjunctivae and sclerae normal  HENT: normal cephalic/atraumatic.  External ears, nose and mouth without ulcers or lesions.  RESP: no audible wheeze, cough, or visible cyanosis.  No visible retractions or increased work of breathing.  Able to speak fully in complete sentences.  NEURO: Cranial nerves grossly intact, mentation intact and speech normal  PSYCH: mentation appears normal, affect normal/bright, judgement and insight intact, normal speech and appearance well-groomed    CC  Patient Care Team:  Lui Bassett MD as PCP - General (Family Medicine)  Juan Nix NP as Nurse Practitioner (Family Practice)  Peggy Dow MD as MD (Urology)  Eloise Lowery MD as Assigned  PCP  Peggy Dow MD as Assigned Surgical Provider  Tanya Calixto MD as Assigned OBGYN Provider  SELF, REFERRED

## 2023-10-10 NOTE — PATIENT INSTRUCTIONS
Websites with free information:    American Uro[employer]nec[employer] patient website: www.voicesforpfd.org    Total Control Program: www.totalcontrolprogram.com    Continue the physical therapy    Return in about 6 months, sooner if needed    It was a pleasure meeting with you today.  Thank you for allowing me and my team the privilege of caring for you today.  YOU are the reason we are here, and I truly hope we provided you with the excellent service you deserve.  Please let us know if there is anything else we can do for you so that we can be sure you are leaving completely satisfied with your care experience.

## 2023-10-10 NOTE — PROGRESS NOTES
October 10, 2023    Isiah was seen today for urethral lesion.    Diagnoses and all orders for this visit:    Mixed incontinence    Urethral lesion     At this time she is only 3 months post partum and has already started PT.  Discussed that we can revisit things in 6 months, sooner if needed    5 minutes were spent today on the day of the encounter in reviewing the EMR, direct patient care, coordination of care and documentation    Peggy Dow MD MPH  (she/her/hers)   of Urology  [facility]      Subjective    She is here today for follow up.  Last seen about a year ago.  She had a baby girl 3 months ago, vaginal delivery.    Just started pelvic floor PT at [facility].      She denies any other changes in health since last visit    /85   Pulse 62   LMP 03/10/2022 (Exact Date)   SpO2 98%   GENERAL: healthy, alert and no distress  EYES: Eyes grossly normal to inspection, conjunctivae and sclerae normal  HENT: normal cephalic/atraumatic.  External ears, nose and mouth without ulcers or lesions.  RESP: no audible wheeze, cough, or visible cyanosis.  No visible retractions or increased work of breathing.  Able to speak fully in complete sentences.  NEURO: Cranial nerves grossly intact, mentation intact and speech normal  PSYCH: mentation appears normal, affect normal/bright, judgement and insight intact, normal speech and appearance well-groomed    CC  Patient Care Team:  Lui Bassett MD as PCP - General (Family Medicine)  Juan Nix NP as Nurse Practitioner (Family Practice)  Peggy Dow MD as MD (Urology)  Eloise Lowery MD as Assigned PCP  Peggy Dow MD as Assigned Surgical Provider  Tanya Calixto MD as Assigned OBGYN Provider  SELF, REFERRED

## 2023-10-13 ENCOUNTER — IMMUNIZATION (OUTPATIENT)
Dept: PEDIATRICS | Facility: CLINIC | Age: 43
End: 2023-10-13
Payer: COMMERCIAL

## 2023-10-13 DIAGNOSIS — Z23 ENCOUNTER FOR IMMUNIZATION: Primary | ICD-10-CM

## 2023-10-13 PROCEDURE — 90686 IIV4 VACC NO PRSV 0.5 ML IM: CPT

## 2023-10-13 PROCEDURE — 90471 IMMUNIZATION ADMIN: CPT

## 2023-10-13 NOTE — LETTER
October 14, 2023        RE: Isiah Reina        Immunization History   Administered Date(s) Administered    COVID-19 Monovalent 18+ (Moderna) 03/31/2021, 04/28/2021, 11/04/2021    Influenza Vaccine 18-64 (Flublok) 10/07/2020    Influenza Vaccine >6 months (Alfuria,Fluzone) 09/05/2019, 10/06/2021, 10/19/2022, 10/13/2023    MMR 09/20/2018, 10/22/2018    TD,PF 7+ (Tenivac) 06/04/2002    TDAP (Adacel,Boostrix) 06/05/2013    TDAP Vaccine (Boostrix) 08/08/2019    Varicella 09/20/2018, 10/22/2018

## 2023-10-14 NOTE — NURSING NOTE
Immunization record letter sent to patient via [employer] per her request.     Paulette Morales RN

## 2023-11-17 ENCOUNTER — MEDICAL CORRESPONDENCE (OUTPATIENT)
Dept: HEALTH INFORMATION MANAGEMENT | Facility: CLINIC | Age: 43
End: 2023-11-17
Payer: COMMERCIAL

## 2024-02-05 ENCOUNTER — OFFICE VISIT (OUTPATIENT)
Dept: FAMILY MEDICINE | Facility: CLINIC | Age: 44
End: 2024-02-05
Payer: COMMERCIAL

## 2024-02-05 ENCOUNTER — NURSE TRIAGE (OUTPATIENT)
Dept: FAMILY MEDICINE | Facility: CLINIC | Age: 44
End: 2024-02-05

## 2024-02-05 VITALS
DIASTOLIC BLOOD PRESSURE: 86 MMHG | SYSTOLIC BLOOD PRESSURE: 127 MMHG | TEMPERATURE: 97.7 F | OXYGEN SATURATION: 98 % | HEART RATE: 61 BPM

## 2024-02-05 DIAGNOSIS — J01.90 ACUTE RHINOSINUSITIS: Primary | ICD-10-CM

## 2024-02-05 DIAGNOSIS — J02.9 SORE THROAT: ICD-10-CM

## 2024-02-05 LAB
DEPRECATED S PYO AG THROAT QL EIA: NEGATIVE
GROUP A STREP BY PCR: NOT DETECTED

## 2024-02-05 PROCEDURE — 87651 STREP A DNA AMP PROBE: CPT | Performed by: FAMILY MEDICINE

## 2024-02-05 NOTE — PROGRESS NOTES
ASSESSMENT AND PLAN:     (J01.90) Acute rhinosinusitis  (primary encounter diagnosis)  (J02.9) Sore throat  Comment: Acute, uncomplicated.   Swabbed for rapid strep despite low Centor score due to progressively worsening sore throat over 9 days but suspect sinusitis as most likely cause given nasal symptoms.  Discussed antibiotics for possible ABRS at 10 days, similar risks for diarrhea/yeast infection as the chances of benefiting. Gave prescription to have on hand as she is traveling but advised not starting before tomorrow in case she beings to improve.     Plan: amoxicillin-clavulanate (AUGMENTIN) 875-125 MG         tablet  Plan: Streptococcus A Rapid Screen w/Reflex to PCR,         Group A Streptococcus PCR Throat Swab                  Lui Bassett MD   [facility]  02/05/2024, 11:43 AM      SUBJECTIVE:   Isiah is a 43 year old female who presents to clinic today for a return visit.    - started around 9 days ago  - started with a sore throat that has worsened  - sore throat worse at night, can be hard to swallowing due to pain at that time  - can eat and drink okay    - has nasal congestion and green/yellow drainage which started a few days into the illnss    - no fevers  - occasional cough  - no difficulty breathing  - no sinus pain/pressure  - no ear pain/pressure  - no tooth pain/sensitivity    - using cough drops  - took Aleve once a few days ago to help with sleep    - has 2 young kids, 6 months and 4 years  - both are currently well other than nasal congestion for 6 month old and an ear infection a week ago    - leaving tomorrow for a work trip      Patient Active Problem List   Diagnosis    Seasonal allergic rhinitis    Pelvic floor dysfunction     Current Outpatient Medications   Medication    azelastine (ASTELIN) 0.1 % nasal spray    EPINEPHrine (ANY BX GENERIC EQUIV) 0.3 MG/0.3ML injection 2-pack    fluticasone (FLONASE) 50 MCG/ACT nasal spray    montelukast (SINGULAIR) 10 MG  tablet    VITAMIN D, CHOLECALCIFEROL, PO    zolpidem ER (AMBIEN CR) 6.25 MG CR tablet     No current facility-administered medications for this visit.       I have reviewed the patient's relevant past medical history.     OBJECTIVE:   /86   Pulse 61   Temp 97.7  F (36.5  C)   SpO2 98%     Constitutional: well-appearing, appears stated age    ENT: tonsils small and no exudate, no palpable anterior cervical lymphadenopathy.     Results for orders placed or performed in visit on 02/05/24   Streptococcus A Rapid Screen w/Reflex to PCR     Status: Normal    Specimen: Throat; Swab   Result Value Ref Range    Group A Strep antigen Negative Negative

## 2024-02-05 NOTE — TELEPHONE ENCOUNTER
"Offered virtual visit but she is going to call another clinic she also attends to see if she can get an inperson visit.      Reason for Disposition   SEVERE sore throat pain    Additional Information   Negative: SEVERE difficulty breathing (e.g., struggling for each breath, speaks in single words)   Negative: Sounds like a life-threatening emergency to the triager   Negative: Throat culture results, call about   Negative: Productive cough is main symptom   Negative: Runny nose is main symptom   Negative: Drooling or spitting out saliva (because can't swallow)   Negative: Unable to open mouth completely   Negative: Drinking very little and has signs of dehydration (e.g., no urine > 12 hours, very dry mouth, very lightheaded)   Negative: Patient sounds very sick or weak to the triager   Negative: Difficulty breathing (per caller) but not severe   Negative: Fever > 103 F (39.4 C)   Negative: Refuses to drink anything for > 12 hours    Answer Assessment - Initial Assessment Questions  1. ONSET: \"When did the throat start hurting?\" (Hours or days ago)       One week ago  2. SEVERITY: \"How bad is the sore throat?\" (Scale 1-10; mild, moderate or severe)    - MILD (1-3):  Doesn't interfere with eating or normal activities.    - MODERATE (4-7): Interferes with eating some solids and normal activities.    - SEVERE (8-10):  Excruciating pain, interferes with most normal activities.    - SEVERE WITH DYSPHAGIA (10): Can't swallow liquids, drooling.      Awakens her at nite  3. STREP EXPOSURE: \"Has there been any exposure to strep within the past week?\" If Yes, ask: \"What type of contact occurred?\"       Kids at   4.  VIRAL SYMPTOMS: \"Are there any symptoms of a cold, such as a runny nose, cough, hoarse voice or red eyes?\"       Congestion and slight cough  5. FEVER: \"Do you have a fever?\" If Yes, ask: \"What is your temperature, how was it measured, and when did it start?\"      no  6. PUS ON THE TONSILS: \"Is there pus on " "the tonsils in the back of your throat?\"      Hard to tell  7. OTHER SYMPTOMS: \"Do you have any other symptoms?\" (e.g., difficulty breathing, headache, rash)      no  8. PREGNANCY: \"Is there any chance you are pregnant?\" \"When was your last menstrual period?\"      no    Protocols used: Sore Throat-A-OH    "

## 2024-02-05 NOTE — NURSING NOTE
43 year old  Chief Complaint   Patient presents with    Congestion     Sore throat, ongoing for 7 days has not tested for covid.       Blood pressure 127/86, pulse 61, temperature 97.7  F (36.5  C), SpO2 98%, unknown if currently breastfeeding. There is no height or weight on file to calculate BMI.  Patient Active Problem List   Diagnosis    Seasonal allergic rhinitis    Pelvic floor dysfunction       Wt Readings from Last 2 Encounters:   10/27/22 83.9 kg (185 lb)   04/28/22 83.5 kg (184 lb)     BP Readings from Last 3 Encounters:   02/05/24 127/86   10/10/23 130/85   10/27/22 128/85         Current Outpatient Medications   Medication    azelastine (ASTELIN) 0.1 % nasal spray    EPINEPHrine (ANY BX GENERIC EQUIV) 0.3 MG/0.3ML injection 2-pack    fluticasone (FLONASE) 50 MCG/ACT nasal spray    montelukast (SINGULAIR) 10 MG tablet    VITAMIN D, CHOLECALCIFEROL, PO    zolpidem ER (AMBIEN CR) 6.25 MG CR tablet     No current facility-administered medications for this visit.       Social History     Tobacco Use    Smoking status: Never    Smokeless tobacco: Never   Substance Use Topics    Alcohol use: Not Currently     Alcohol/week: 1.0 standard drink of alcohol     Comment: stopped in ifv   and pregnancy    Drug use: No       Health Maintenance Due   Topic Date Due    MAMMO SCREENING  Never done    HEPATITIS B IMMUNIZATION (1 of 3 - 3-dose series) Never done       Lab Results   Component Value Date    PAP NIL 11/11/2016 February 5, 2024 11:31 AM

## 2024-02-18 ENCOUNTER — HEALTH MAINTENANCE LETTER (OUTPATIENT)
Age: 44
End: 2024-02-18

## 2024-02-19 ENCOUNTER — PRE VISIT (OUTPATIENT)
Dept: UROLOGY | Facility: CLINIC | Age: 44
End: 2024-02-19
Payer: COMMERCIAL

## 2024-02-19 NOTE — TELEPHONE ENCOUNTER
Reason for visit: Mixed incontinence     Relevant information: was seen on 10/10/23 for urethral lesion, mixed incontinence, mild pelvic floor weakness. Patient stated when she started her period, felt like she was leaking a few times and didn't seem to make it to the bathroom. Leakage when coughing or sneezing. Was given plan for lengthening and strengthening exercises.     Records/imaging/labs/orders: all record available    Pt called: no need for a call    At Rooming: have pt empty bladder/pvr, Document PVR      Jacob Adame  2/19/2024  2:07 PM

## 2024-11-04 ENCOUNTER — ANCILLARY PROCEDURE (OUTPATIENT)
Dept: MAMMOGRAPHY | Facility: CLINIC | Age: 44
End: 2024-11-04
Attending: FAMILY MEDICINE
Payer: COMMERCIAL

## 2024-11-04 DIAGNOSIS — Z12.31 VISIT FOR SCREENING MAMMOGRAM: ICD-10-CM

## 2024-11-04 PROCEDURE — 77063 BREAST TOMOSYNTHESIS BI: CPT | Mod: TC | Performed by: RADIOLOGY

## 2024-11-04 PROCEDURE — 77067 SCR MAMMO BI INCL CAD: CPT | Mod: TC | Performed by: RADIOLOGY

## 2024-11-24 ENCOUNTER — HEALTH MAINTENANCE LETTER (OUTPATIENT)
Age: 44
End: 2024-11-24

## 2025-05-02 ENCOUNTER — TRANSFERRED RECORDS (OUTPATIENT)
Dept: HEALTH INFORMATION MANAGEMENT | Facility: CLINIC | Age: 45
End: 2025-05-02
Payer: COMMERCIAL